# Patient Record
Sex: MALE | URBAN - METROPOLITAN AREA
[De-identification: names, ages, dates, MRNs, and addresses within clinical notes are randomized per-mention and may not be internally consistent; named-entity substitution may affect disease eponyms.]

---

## 2021-08-10 ENCOUNTER — DOCTOR'S OFFICE (OUTPATIENT)
Dept: URBAN - METROPOLITAN AREA CLINIC 163 | Facility: CLINIC | Age: 76
Setting detail: OPHTHALMOLOGY
End: 2021-08-10
Payer: MEDICARE

## 2021-08-10 PROCEDURE — 92060 SENSORIMOTOR EXAMINATION: CPT | Performed by: OPHTHALMOLOGY

## 2021-08-10 PROCEDURE — 92004 COMPRE OPH EXAM NEW PT 1/>: CPT | Performed by: OPHTHALMOLOGY

## 2021-08-10 ASSESSMENT — SPHEQUIV_DERIVED: OS_SPHEQUIV: -0.75

## 2021-08-10 ASSESSMENT — REFRACTION_CURRENTRX
OD_CYLINDER: +1.00
OS_SPHERE: +2.25
OD_SPHERE: +2.75
OD_AXIS: 151
OD_OVR_VA: 20/
OS_CYLINDER: +1.00
OS_AXIS: 27
OS_OVR_VA: 20/

## 2021-08-10 ASSESSMENT — REFRACTION_AUTOREFRACTION
OD_CYLINDER: +1.00
OS_CYLINDER: +1.00
OS_AXIS: 46
OS_SPHERE: -1.25
OD_AXIS: 150
OD_SPHERE: PL

## 2021-08-10 ASSESSMENT — CONFRONTATIONAL VISUAL FIELD TEST (CVF)
OD_FINDINGS: FULL
OS_FINDINGS: FULL

## 2021-08-10 ASSESSMENT — TONOMETRY
OS_IOP_MMHG: 11
OD_IOP_MMHG: 12

## 2021-08-10 ASSESSMENT — VISUAL ACUITY
OD_BCVA: 20/25
OS_BCVA: 20/50+2

## 2021-08-25 ENCOUNTER — DOCTOR'S OFFICE (OUTPATIENT)
Dept: URBAN - METROPOLITAN AREA CLINIC 163 | Facility: CLINIC | Age: 76
Setting detail: OPHTHALMOLOGY
End: 2021-08-25
Payer: MEDICARE

## 2021-08-25 PROCEDURE — 66821 AFTER CATARACT LASER SURGERY: CPT | Performed by: OPHTHALMOLOGY

## 2021-08-25 ASSESSMENT — REFRACTION_CURRENTRX
OD_CYLINDER: +1.00
OS_AXIS: 27
OS_SPHERE: +2.25
OD_SPHERE: +2.75
OD_OVR_VA: 20/
OS_CYLINDER: +1.00
OD_AXIS: 151
OS_OVR_VA: 20/

## 2021-08-25 ASSESSMENT — REFRACTION_AUTOREFRACTION
OD_CYLINDER: +1.00
OD_SPHERE: PL
OS_AXIS: 46
OD_AXIS: 150
OS_SPHERE: -1.25
OS_CYLINDER: +1.00

## 2021-08-25 ASSESSMENT — VISUAL ACUITY
OS_BCVA: 20/50+2
OD_BCVA: 20/25

## 2021-08-25 ASSESSMENT — SPHEQUIV_DERIVED: OS_SPHEQUIV: -0.75

## 2021-09-01 ENCOUNTER — DOCTOR'S OFFICE (OUTPATIENT)
Dept: URBAN - METROPOLITAN AREA CLINIC 163 | Facility: CLINIC | Age: 76
Setting detail: OPHTHALMOLOGY
End: 2021-09-01
Payer: MEDICARE

## 2021-09-01 PROCEDURE — 99024 POSTOP FOLLOW-UP VISIT: CPT | Performed by: OPTOMETRIST

## 2021-09-01 PROCEDURE — 66821 AFTER CATARACT LASER SURGERY: CPT | Performed by: OPTOMETRIST

## 2021-09-01 ASSESSMENT — REFRACTION_CURRENTRX
OS_AXIS: 27
OD_OVR_VA: 20/
OD_AXIS: 151
OD_SPHERE: +2.75
OS_SPHERE: +2.25
OS_OVR_VA: 20/
OS_CYLINDER: +1.00
OD_CYLINDER: +1.00

## 2021-09-01 ASSESSMENT — REFRACTION_AUTOREFRACTION
OD_CYLINDER: +1.00
OS_AXIS: 46
OS_SPHERE: -1.25
OD_AXIS: 150
OD_SPHERE: PL
OS_CYLINDER: +1.00

## 2021-09-01 ASSESSMENT — SPHEQUIV_DERIVED: OS_SPHEQUIV: -0.75

## 2021-09-01 ASSESSMENT — CONFRONTATIONAL VISUAL FIELD TEST (CVF)
OD_FINDINGS: FULL
OS_FINDINGS: FULL

## 2021-09-01 ASSESSMENT — VISUAL ACUITY
OS_BCVA: 20/50+2
OD_BCVA: 20/25

## 2021-09-14 ENCOUNTER — DOCTOR'S OFFICE (OUTPATIENT)
Dept: URBAN - METROPOLITAN AREA CLINIC 163 | Facility: CLINIC | Age: 76
Setting detail: OPHTHALMOLOGY
End: 2021-09-14
Payer: MEDICARE

## 2021-09-14 PROCEDURE — 99024 POSTOP FOLLOW-UP VISIT: CPT | Performed by: OPHTHALMOLOGY

## 2021-09-14 PROCEDURE — 92060 SENSORIMOTOR EXAMINATION: CPT | Performed by: OPHTHALMOLOGY

## 2021-09-14 ASSESSMENT — REFRACTION_AUTOREFRACTION
OD_SPHERE: 0.00
OS_SPHERE: -1.00
OD_AXIS: 156
OS_AXIS: 42
OS_CYLINDER: +0.75
OD_CYLINDER: +1.25

## 2021-09-14 ASSESSMENT — REFRACTION_MANIFEST
OD_VA1: 20/25
OD_CYLINDER: +1.00
OD_ADD: +2.50
OS_HPRISM: 5
OD_VPRISM_DIRECTION: BI
OS_CYLINDER: +0.75
OD_AXIS: 155
OS_VA1: 20/25
OS_ADD: +2.50
OS_AXIS: 25
OS_VPRISM_DIRECTION: BI
OD_SPHERE: +0.75
OD_HPRISM: 5
OS_SPHERE: PLANO

## 2021-09-14 ASSESSMENT — SPHEQUIV_DERIVED
OD_SPHEQUIV: 0.625
OS_SPHEQUIV: -0.625
OD_SPHEQUIV: 1.25

## 2021-09-14 ASSESSMENT — REFRACTION_CURRENTRX
OS_AXIS: 20
OD_AXIS: 152
OS_OVR_VA: 20/
OD_OVR_VA: 20/
OS_SPHERE: +2.25
OD_SPHERE: +2.50
OD_CYLINDER: +1.25
OS_CYLINDER: +1.00

## 2021-09-14 ASSESSMENT — VISUAL ACUITY
OD_BCVA: 20/25-1
OS_BCVA: 20/25-1

## 2022-01-12 ENCOUNTER — DOCTOR'S OFFICE (OUTPATIENT)
Dept: URBAN - METROPOLITAN AREA CLINIC 163 | Facility: CLINIC | Age: 77
Setting detail: OPHTHALMOLOGY
End: 2022-01-12
Payer: MEDICARE

## 2022-01-12 PROBLEM — H26.492 PCO; LEFT EYE: Status: ACTIVE | Noted: 2022-01-12

## 2022-01-12 PROBLEM — H50.112 EXOTROPIA, MONOCULAR, LEFT EYE: Status: ACTIVE | Noted: 2021-08-10

## 2022-01-12 PROBLEM — H53.2: Status: ACTIVE | Noted: 2021-08-10

## 2022-01-12 PROCEDURE — 92014 COMPRE OPH EXAM EST PT 1/>: CPT | Performed by: OPTOMETRIST

## 2022-01-12 ASSESSMENT — REFRACTION_MANIFEST
OS_CYLINDER: +0.75
OS_HPRISM: 5
OS_CYLINDER: +1.00
OS_SPHERE: PLANO
OS_VA1: 20/25
OS_SPHERE: -1.00
OD_AXIS: 155
OD_VA1: 20/25
OD_CYLINDER: +1.00
OS_ADD: +2.50
OD_CYLINDER: +1.00
OD_ADD: +2.50
OS_AXIS: 45
OD_SPHERE: +0.75
OD_SPHERE: PL
OS_ADD: +2.50
OD_AXIS: 145
OS_AXIS: 25
OD_VA1: 20/25
OD_ADD: +2.50
OD_HPRISM: 5
OS_VA1: 20/25
OD_VPRISM_DIRECTION: BI
OS_VPRISM_DIRECTION: BI

## 2022-01-12 ASSESSMENT — SPHEQUIV_DERIVED
OD_SPHEQUIV: 1.25
OS_SPHEQUIV: -0.5
OD_SPHEQUIV: 0
OS_SPHEQUIV: -1

## 2022-01-12 ASSESSMENT — VISUAL ACUITY
OS_BCVA: 20/40
OD_BCVA: 20/40

## 2022-01-12 ASSESSMENT — REFRACTION_CURRENTRX
OD_CYLINDER: +1.25
OS_OVR_VA: 20/
OD_OVR_VA: 20/
OD_SPHERE: +2.50
OS_CYLINDER: +1.00
OS_SPHERE: +2.25
OD_AXIS: 152
OS_AXIS: 20

## 2022-01-12 ASSESSMENT — REFRACTION_AUTOREFRACTION
OS_AXIS: 40
OS_SPHERE: -1.50
OD_AXIS: 155
OS_CYLINDER: +1.00
OD_SPHERE: -0.50
OD_CYLINDER: +1.00

## 2022-01-12 ASSESSMENT — CONFRONTATIONAL VISUAL FIELD TEST (CVF)
OD_FINDINGS: FULL
OS_FINDINGS: FULL

## 2022-07-14 ENCOUNTER — TRANSCRIPTION ENCOUNTER (OUTPATIENT)
Age: 77
End: 2022-07-14

## 2022-07-15 ENCOUNTER — INPATIENT (INPATIENT)
Facility: HOSPITAL | Age: 77
LOS: 9 days | Discharge: NURSING, REHAB OR LONG-TERM CARE | DRG: 252 | End: 2022-07-25
Attending: SURGERY | Admitting: SURGERY
Payer: MEDICARE

## 2022-07-15 VITALS
RESPIRATION RATE: 18 BRPM | HEART RATE: 60 BPM | OXYGEN SATURATION: 92 % | TEMPERATURE: 99 F | SYSTOLIC BLOOD PRESSURE: 172 MMHG | DIASTOLIC BLOOD PRESSURE: 75 MMHG

## 2022-07-15 DIAGNOSIS — I72.4 ANEURYSM OF ARTERY OF LOWER EXTREMITY: Chronic | ICD-10-CM

## 2022-07-15 DIAGNOSIS — I70.25 ATHEROSCLEROSIS OF NATIVE ARTERIES OF OTHER EXTREMITIES WITH ULCERATION: ICD-10-CM

## 2022-07-15 DIAGNOSIS — Z95.2 PRESENCE OF PROSTHETIC HEART VALVE: Chronic | ICD-10-CM

## 2022-07-15 DIAGNOSIS — Z95.0 PRESENCE OF CARDIAC PACEMAKER: Chronic | ICD-10-CM

## 2022-07-15 LAB
ALBUMIN SERPL ELPH-MCNC: 4.5 G/DL — SIGNIFICANT CHANGE UP (ref 3.3–5)
ALP SERPL-CCNC: 61 U/L — SIGNIFICANT CHANGE UP (ref 40–120)
ALT FLD-CCNC: 42 U/L — SIGNIFICANT CHANGE UP (ref 10–45)
ANION GAP SERPL CALC-SCNC: 14 MMOL/L — SIGNIFICANT CHANGE UP (ref 5–17)
ANION GAP SERPL CALC-SCNC: 14 MMOL/L — SIGNIFICANT CHANGE UP (ref 5–17)
APTT BLD: 106.9 SEC — HIGH (ref 27.5–35.5)
APTT BLD: 29 SEC — SIGNIFICANT CHANGE UP (ref 27.5–35.5)
APTT BLD: 32.1 SEC — SIGNIFICANT CHANGE UP (ref 27.5–35.5)
AST SERPL-CCNC: 119 U/L — HIGH (ref 10–40)
BASOPHILS # BLD AUTO: 0.06 K/UL — SIGNIFICANT CHANGE UP (ref 0–0.2)
BASOPHILS NFR BLD AUTO: 0.6 % — SIGNIFICANT CHANGE UP (ref 0–2)
BILIRUB SERPL-MCNC: 0.7 MG/DL — SIGNIFICANT CHANGE UP (ref 0.2–1.2)
BLD GP AB SCN SERPL QL: NEGATIVE — SIGNIFICANT CHANGE UP
BUN SERPL-MCNC: 26 MG/DL — HIGH (ref 7–23)
BUN SERPL-MCNC: 29 MG/DL — HIGH (ref 7–23)
CALCIUM SERPL-MCNC: 9.4 MG/DL — SIGNIFICANT CHANGE UP (ref 8.4–10.5)
CALCIUM SERPL-MCNC: 9.9 MG/DL — SIGNIFICANT CHANGE UP (ref 8.4–10.5)
CHLORIDE SERPL-SCNC: 96 MMOL/L — SIGNIFICANT CHANGE UP (ref 96–108)
CHLORIDE SERPL-SCNC: 97 MMOL/L — SIGNIFICANT CHANGE UP (ref 96–108)
CO2 SERPL-SCNC: 22 MMOL/L — SIGNIFICANT CHANGE UP (ref 22–31)
CO2 SERPL-SCNC: 24 MMOL/L — SIGNIFICANT CHANGE UP (ref 22–31)
CREAT SERPL-MCNC: 1.24 MG/DL — SIGNIFICANT CHANGE UP (ref 0.5–1.3)
CREAT SERPL-MCNC: 1.27 MG/DL — SIGNIFICANT CHANGE UP (ref 0.5–1.3)
EGFR: 58 ML/MIN/1.73M2 — LOW
EGFR: 60 ML/MIN/1.73M2 — SIGNIFICANT CHANGE UP
EOSINOPHIL # BLD AUTO: 0.04 K/UL — SIGNIFICANT CHANGE UP (ref 0–0.5)
EOSINOPHIL NFR BLD AUTO: 0.4 % — SIGNIFICANT CHANGE UP (ref 0–6)
GLUCOSE BLDC GLUCOMTR-MCNC: 128 MG/DL — HIGH (ref 70–99)
GLUCOSE BLDC GLUCOMTR-MCNC: 140 MG/DL — HIGH (ref 70–99)
GLUCOSE SERPL-MCNC: 129 MG/DL — HIGH (ref 70–99)
GLUCOSE SERPL-MCNC: 132 MG/DL — HIGH (ref 70–99)
HCT VFR BLD CALC: 39.6 % — SIGNIFICANT CHANGE UP (ref 39–50)
HCT VFR BLD CALC: 40.9 % — SIGNIFICANT CHANGE UP (ref 39–50)
HCT VFR BLD CALC: 41.9 % — SIGNIFICANT CHANGE UP (ref 39–50)
HGB BLD-MCNC: 13.9 G/DL — SIGNIFICANT CHANGE UP (ref 13–17)
HGB BLD-MCNC: 14.7 G/DL — SIGNIFICANT CHANGE UP (ref 13–17)
HGB BLD-MCNC: 14.7 G/DL — SIGNIFICANT CHANGE UP (ref 13–17)
IMM GRANULOCYTES NFR BLD AUTO: 0.4 % — SIGNIFICANT CHANGE UP (ref 0–1.5)
INR BLD: 1.12 — SIGNIFICANT CHANGE UP (ref 0.88–1.16)
ISTAT ACTK (ACTIVATED CLOTTING TIME KAOLIN): 144 SEC — HIGH (ref 74–137)
LYMPHOCYTES # BLD AUTO: 0.92 K/UL — LOW (ref 1–3.3)
LYMPHOCYTES # BLD AUTO: 8.9 % — LOW (ref 13–44)
MAGNESIUM SERPL-MCNC: 1.8 MG/DL — SIGNIFICANT CHANGE UP (ref 1.6–2.6)
MCHC RBC-ENTMCNC: 31.7 PG — SIGNIFICANT CHANGE UP (ref 27–34)
MCHC RBC-ENTMCNC: 31.8 PG — SIGNIFICANT CHANGE UP (ref 27–34)
MCHC RBC-ENTMCNC: 32.2 PG — SIGNIFICANT CHANGE UP (ref 27–34)
MCHC RBC-ENTMCNC: 35.1 GM/DL — SIGNIFICANT CHANGE UP (ref 32–36)
MCHC RBC-ENTMCNC: 35.1 GM/DL — SIGNIFICANT CHANGE UP (ref 32–36)
MCHC RBC-ENTMCNC: 35.9 GM/DL — SIGNIFICANT CHANGE UP (ref 32–36)
MCV RBC AUTO: 89.5 FL — SIGNIFICANT CHANGE UP (ref 80–100)
MCV RBC AUTO: 90.3 FL — SIGNIFICANT CHANGE UP (ref 80–100)
MCV RBC AUTO: 90.6 FL — SIGNIFICANT CHANGE UP (ref 80–100)
MONOCYTES # BLD AUTO: 1.18 K/UL — HIGH (ref 0–0.9)
MONOCYTES NFR BLD AUTO: 11.5 % — SIGNIFICANT CHANGE UP (ref 2–14)
NEUTROPHILS # BLD AUTO: 8.06 K/UL — HIGH (ref 1.8–7.4)
NEUTROPHILS NFR BLD AUTO: 78.2 % — HIGH (ref 43–77)
NRBC # BLD: 0 /100 WBCS — SIGNIFICANT CHANGE UP (ref 0–0)
PLATELET # BLD AUTO: 154 K/UL — SIGNIFICANT CHANGE UP (ref 150–400)
PLATELET # BLD AUTO: 158 K/UL — SIGNIFICANT CHANGE UP (ref 150–400)
PLATELET # BLD AUTO: 167 K/UL — SIGNIFICANT CHANGE UP (ref 150–400)
POTASSIUM SERPL-MCNC: 4 MMOL/L — SIGNIFICANT CHANGE UP (ref 3.5–5.3)
POTASSIUM SERPL-MCNC: 4 MMOL/L — SIGNIFICANT CHANGE UP (ref 3.5–5.3)
POTASSIUM SERPL-SCNC: 4 MMOL/L — SIGNIFICANT CHANGE UP (ref 3.5–5.3)
POTASSIUM SERPL-SCNC: 4 MMOL/L — SIGNIFICANT CHANGE UP (ref 3.5–5.3)
PROT SERPL-MCNC: 7.5 G/DL — SIGNIFICANT CHANGE UP (ref 6–8.3)
PROTHROM AB SERPL-ACNC: 13.3 SEC — SIGNIFICANT CHANGE UP (ref 10.5–13.4)
RBC # BLD: 4.37 M/UL — SIGNIFICANT CHANGE UP (ref 4.2–5.8)
RBC # BLD: 4.57 M/UL — SIGNIFICANT CHANGE UP (ref 4.2–5.8)
RBC # BLD: 4.64 M/UL — SIGNIFICANT CHANGE UP (ref 4.2–5.8)
RBC # FLD: 13.6 % — SIGNIFICANT CHANGE UP (ref 10.3–14.5)
RBC # FLD: 13.7 % — SIGNIFICANT CHANGE UP (ref 10.3–14.5)
RBC # FLD: 13.8 % — SIGNIFICANT CHANGE UP (ref 10.3–14.5)
RH IG SCN BLD-IMP: POSITIVE — SIGNIFICANT CHANGE UP
SARS-COV-2 RNA SPEC QL NAA+PROBE: NEGATIVE — SIGNIFICANT CHANGE UP
SODIUM SERPL-SCNC: 132 MMOL/L — LOW (ref 135–145)
SODIUM SERPL-SCNC: 135 MMOL/L — SIGNIFICANT CHANGE UP (ref 135–145)
WBC # BLD: 10.3 K/UL — SIGNIFICANT CHANGE UP (ref 3.8–10.5)
WBC # BLD: 9.37 K/UL — SIGNIFICANT CHANGE UP (ref 3.8–10.5)
WBC # BLD: 9.41 K/UL — SIGNIFICANT CHANGE UP (ref 3.8–10.5)
WBC # FLD AUTO: 10.3 K/UL — SIGNIFICANT CHANGE UP (ref 3.8–10.5)
WBC # FLD AUTO: 9.37 K/UL — SIGNIFICANT CHANGE UP (ref 3.8–10.5)
WBC # FLD AUTO: 9.41 K/UL — SIGNIFICANT CHANGE UP (ref 3.8–10.5)

## 2022-07-15 PROCEDURE — 93010 ELECTROCARDIOGRAM REPORT: CPT

## 2022-07-15 PROCEDURE — 37211 THROMBOLYTIC ART THERAPY: CPT | Mod: GC

## 2022-07-15 PROCEDURE — 76937 US GUIDE VASCULAR ACCESS: CPT | Mod: 26,GC

## 2022-07-15 PROCEDURE — 71045 X-RAY EXAM CHEST 1 VIEW: CPT | Mod: 26

## 2022-07-15 PROCEDURE — 36246 INS CATH ABD/L-EXT ART 2ND: CPT

## 2022-07-15 PROCEDURE — 99285 EMERGENCY DEPT VISIT HI MDM: CPT | Mod: 25

## 2022-07-15 RX ORDER — HYDRALAZINE HCL 50 MG
10 TABLET ORAL ONCE
Refills: 0 | Status: COMPLETED | OUTPATIENT
Start: 2022-07-15 | End: 2022-07-15

## 2022-07-15 RX ORDER — FENOFIBRATE,MICRONIZED 130 MG
145 CAPSULE ORAL DAILY
Refills: 0 | Status: DISCONTINUED | OUTPATIENT
Start: 2022-07-15 | End: 2022-07-18

## 2022-07-15 RX ORDER — ATORVASTATIN CALCIUM 80 MG/1
40 TABLET, FILM COATED ORAL DAILY
Refills: 0 | Status: DISCONTINUED | OUTPATIENT
Start: 2022-07-15 | End: 2022-07-15

## 2022-07-15 RX ORDER — ATORVASTATIN CALCIUM 80 MG/1
40 TABLET, FILM COATED ORAL AT BEDTIME
Refills: 0 | Status: DISCONTINUED | OUTPATIENT
Start: 2022-07-15 | End: 2022-07-18

## 2022-07-15 RX ORDER — ASPIRIN/CALCIUM CARB/MAGNESIUM 324 MG
81 TABLET ORAL DAILY
Refills: 0 | Status: DISCONTINUED | OUTPATIENT
Start: 2022-07-16 | End: 2022-07-18

## 2022-07-15 RX ORDER — VENLAFAXINE HCL 75 MG
100 CAPSULE, EXT RELEASE 24 HR ORAL
Refills: 0 | Status: DISCONTINUED | OUTPATIENT
Start: 2022-07-15 | End: 2022-07-18

## 2022-07-15 RX ORDER — METHOCARBAMOL 500 MG/1
500 TABLET, FILM COATED ORAL THREE TIMES A DAY
Refills: 0 | Status: DISCONTINUED | OUTPATIENT
Start: 2022-07-15 | End: 2022-07-18

## 2022-07-15 RX ORDER — HYDROMORPHONE HYDROCHLORIDE 2 MG/ML
0.25 INJECTION INTRAMUSCULAR; INTRAVENOUS; SUBCUTANEOUS ONCE
Refills: 0 | Status: DISCONTINUED | OUTPATIENT
Start: 2022-07-15 | End: 2022-07-15

## 2022-07-15 RX ORDER — CLONAZEPAM 1 MG
0.5 TABLET ORAL EVERY 8 HOURS
Refills: 0 | Status: DISCONTINUED | OUTPATIENT
Start: 2022-07-15 | End: 2022-07-16

## 2022-07-15 RX ORDER — ACETAMINOPHEN 500 MG
1000 TABLET ORAL EVERY 6 HOURS
Refills: 0 | Status: DISCONTINUED | OUTPATIENT
Start: 2022-07-15 | End: 2022-07-15

## 2022-07-15 RX ORDER — TRAZODONE HCL 50 MG
150 TABLET ORAL AT BEDTIME
Refills: 0 | Status: DISCONTINUED | OUTPATIENT
Start: 2022-07-15 | End: 2022-07-18

## 2022-07-15 RX ORDER — MORPHINE SULFATE 50 MG/1
2 CAPSULE, EXTENDED RELEASE ORAL ONCE
Refills: 0 | Status: DISCONTINUED | OUTPATIENT
Start: 2022-07-15 | End: 2022-07-15

## 2022-07-15 RX ORDER — ALLOPURINOL 300 MG
100 TABLET ORAL DAILY
Refills: 0 | Status: DISCONTINUED | OUTPATIENT
Start: 2022-07-15 | End: 2022-07-18

## 2022-07-15 RX ORDER — OXYCODONE HYDROCHLORIDE 5 MG/1
5 TABLET ORAL EVERY 4 HOURS
Refills: 0 | Status: DISCONTINUED | OUTPATIENT
Start: 2022-07-15 | End: 2022-07-15

## 2022-07-15 RX ORDER — SODIUM CHLORIDE 9 MG/ML
1000 INJECTION INTRAMUSCULAR; INTRAVENOUS; SUBCUTANEOUS
Refills: 0 | Status: DISCONTINUED | OUTPATIENT
Start: 2022-07-15 | End: 2022-07-16

## 2022-07-15 RX ORDER — HEPARIN SODIUM 5000 [USP'U]/ML
7000 INJECTION INTRAVENOUS; SUBCUTANEOUS EVERY 6 HOURS
Refills: 0 | Status: DISCONTINUED | OUTPATIENT
Start: 2022-07-15 | End: 2022-07-15

## 2022-07-15 RX ORDER — OXYCODONE HYDROCHLORIDE 5 MG/1
5 TABLET ORAL ONCE
Refills: 0 | Status: DISCONTINUED | OUTPATIENT
Start: 2022-07-15 | End: 2022-07-15

## 2022-07-15 RX ORDER — OXYCODONE AND ACETAMINOPHEN 5; 325 MG/1; MG/1
2 TABLET ORAL EVERY 4 HOURS
Refills: 0 | Status: DISCONTINUED | OUTPATIENT
Start: 2022-07-15 | End: 2022-07-18

## 2022-07-15 RX ORDER — HEPARIN SODIUM 5000 [USP'U]/ML
1500 INJECTION INTRAVENOUS; SUBCUTANEOUS
Qty: 25000 | Refills: 0 | Status: DISCONTINUED | OUTPATIENT
Start: 2022-07-15 | End: 2022-07-15

## 2022-07-15 RX ORDER — HEPARIN SODIUM 5000 [USP'U]/ML
1400 INJECTION INTRAVENOUS; SUBCUTANEOUS
Qty: 25000 | Refills: 0 | Status: DISCONTINUED | OUTPATIENT
Start: 2022-07-15 | End: 2022-07-18

## 2022-07-15 RX ORDER — NIFEDIPINE 30 MG
60 TABLET, EXTENDED RELEASE 24 HR ORAL DAILY
Refills: 0 | Status: DISCONTINUED | OUTPATIENT
Start: 2022-07-15 | End: 2022-07-18

## 2022-07-15 RX ORDER — HYDRALAZINE HCL 50 MG
5 TABLET ORAL ONCE
Refills: 0 | Status: COMPLETED | OUTPATIENT
Start: 2022-07-15 | End: 2022-07-15

## 2022-07-15 RX ORDER — HEPARIN SODIUM 5000 [USP'U]/ML
3500 INJECTION INTRAVENOUS; SUBCUTANEOUS EVERY 6 HOURS
Refills: 0 | Status: DISCONTINUED | OUTPATIENT
Start: 2022-07-15 | End: 2022-07-15

## 2022-07-15 RX ORDER — CARBAMAZEPINE 200 MG
200 TABLET ORAL
Refills: 0 | Status: DISCONTINUED | OUTPATIENT
Start: 2022-07-15 | End: 2022-07-18

## 2022-07-15 RX ORDER — PANTOPRAZOLE SODIUM 20 MG/1
40 TABLET, DELAYED RELEASE ORAL
Refills: 0 | Status: DISCONTINUED | OUTPATIENT
Start: 2022-07-15 | End: 2022-07-18

## 2022-07-15 RX ORDER — HYDROMORPHONE HYDROCHLORIDE 2 MG/ML
0.5 INJECTION INTRAMUSCULAR; INTRAVENOUS; SUBCUTANEOUS ONCE
Refills: 0 | Status: DISCONTINUED | OUTPATIENT
Start: 2022-07-15 | End: 2022-07-15

## 2022-07-15 RX ORDER — LABETALOL HCL 100 MG
10 TABLET ORAL ONCE
Refills: 0 | Status: COMPLETED | OUTPATIENT
Start: 2022-07-15 | End: 2022-07-15

## 2022-07-15 RX ORDER — HEPARIN SODIUM 5000 [USP'U]/ML
INJECTION INTRAVENOUS; SUBCUTANEOUS
Qty: 25000 | Refills: 0 | Status: DISCONTINUED | OUTPATIENT
Start: 2022-07-15 | End: 2022-07-15

## 2022-07-15 RX ORDER — HEPARIN SODIUM 5000 [USP'U]/ML
7000 INJECTION INTRAVENOUS; SUBCUTANEOUS ONCE
Refills: 0 | Status: COMPLETED | OUTPATIENT
Start: 2022-07-15 | End: 2022-07-15

## 2022-07-15 RX ORDER — TAMSULOSIN HYDROCHLORIDE 0.4 MG/1
0.4 CAPSULE ORAL AT BEDTIME
Refills: 0 | Status: DISCONTINUED | OUTPATIENT
Start: 2022-07-15 | End: 2022-07-18

## 2022-07-15 RX ADMIN — Medication 150 MILLIGRAM(S): at 21:29

## 2022-07-15 RX ADMIN — HYDROMORPHONE HYDROCHLORIDE 0.5 MILLIGRAM(S): 2 INJECTION INTRAMUSCULAR; INTRAVENOUS; SUBCUTANEOUS at 14:31

## 2022-07-15 RX ADMIN — MORPHINE SULFATE 2 MILLIGRAM(S): 50 CAPSULE, EXTENDED RELEASE ORAL at 04:00

## 2022-07-15 RX ADMIN — HEPARIN SODIUM 7000 UNIT(S): 5000 INJECTION INTRAVENOUS; SUBCUTANEOUS at 04:02

## 2022-07-15 RX ADMIN — HYDROMORPHONE HYDROCHLORIDE 0.25 MILLIGRAM(S): 2 INJECTION INTRAMUSCULAR; INTRAVENOUS; SUBCUTANEOUS at 14:27

## 2022-07-15 RX ADMIN — METHOCARBAMOL 500 MILLIGRAM(S): 500 TABLET, FILM COATED ORAL at 18:36

## 2022-07-15 RX ADMIN — HYDROMORPHONE HYDROCHLORIDE 0.25 MILLIGRAM(S): 2 INJECTION INTRAMUSCULAR; INTRAVENOUS; SUBCUTANEOUS at 14:12

## 2022-07-15 RX ADMIN — TAMSULOSIN HYDROCHLORIDE 0.4 MILLIGRAM(S): 0.4 CAPSULE ORAL at 21:30

## 2022-07-15 RX ADMIN — OXYCODONE AND ACETAMINOPHEN 2 TABLET(S): 5; 325 TABLET ORAL at 15:44

## 2022-07-15 RX ADMIN — Medication 100 MILLIGRAM(S): at 19:55

## 2022-07-15 RX ADMIN — Medication 5 MILLIGRAM(S): at 17:59

## 2022-07-15 RX ADMIN — METHOCARBAMOL 500 MILLIGRAM(S): 500 TABLET, FILM COATED ORAL at 23:52

## 2022-07-15 RX ADMIN — Medication 145 MILLIGRAM(S): at 23:12

## 2022-07-15 RX ADMIN — Medication 60 MILLIGRAM(S): at 06:21

## 2022-07-15 RX ADMIN — OXYCODONE AND ACETAMINOPHEN 2 TABLET(S): 5; 325 TABLET ORAL at 15:14

## 2022-07-15 RX ADMIN — HEPARIN SODIUM 1600 UNIT(S)/HR: 5000 INJECTION INTRAVENOUS; SUBCUTANEOUS at 04:08

## 2022-07-15 RX ADMIN — MORPHINE SULFATE 2 MILLIGRAM(S): 50 CAPSULE, EXTENDED RELEASE ORAL at 03:26

## 2022-07-15 RX ADMIN — HEPARIN SODIUM 14 UNIT(S)/HR: 5000 INJECTION INTRAVENOUS; SUBCUTANEOUS at 20:40

## 2022-07-15 RX ADMIN — Medication 10 MILLIGRAM(S): at 14:05

## 2022-07-15 RX ADMIN — SODIUM CHLORIDE 100 MILLILITER(S): 9 INJECTION INTRAMUSCULAR; INTRAVENOUS; SUBCUTANEOUS at 19:54

## 2022-07-15 RX ADMIN — Medication 200 MILLIGRAM(S): at 06:21

## 2022-07-15 RX ADMIN — OXYCODONE AND ACETAMINOPHEN 2 TABLET(S): 5; 325 TABLET ORAL at 19:55

## 2022-07-15 RX ADMIN — HYDROMORPHONE HYDROCHLORIDE 0.25 MILLIGRAM(S): 2 INJECTION INTRAMUSCULAR; INTRAVENOUS; SUBCUTANEOUS at 06:38

## 2022-07-15 RX ADMIN — ATORVASTATIN CALCIUM 40 MILLIGRAM(S): 80 TABLET, FILM COATED ORAL at 21:30

## 2022-07-15 RX ADMIN — HYDROMORPHONE HYDROCHLORIDE 0.25 MILLIGRAM(S): 2 INJECTION INTRAMUSCULAR; INTRAVENOUS; SUBCUTANEOUS at 06:22

## 2022-07-15 RX ADMIN — PANTOPRAZOLE SODIUM 40 MILLIGRAM(S): 20 TABLET, DELAYED RELEASE ORAL at 06:21

## 2022-07-15 RX ADMIN — Medication 10 MILLIGRAM(S): at 14:31

## 2022-07-15 RX ADMIN — Medication 100 MILLIGRAM(S): at 16:50

## 2022-07-15 RX ADMIN — Medication 5 MILLIGRAM(S): at 18:25

## 2022-07-15 RX ADMIN — OXYCODONE AND ACETAMINOPHEN 2 TABLET(S): 5; 325 TABLET ORAL at 20:55

## 2022-07-15 RX ADMIN — Medication 0.5 MILLIGRAM(S): at 18:24

## 2022-07-15 NOTE — BRIEF OPERATIVE NOTE - ASSISTANT(S)
Patient Education     Viral Diarrhea (Adult)    Diarrhea caused by a virus is often called viral gastroenteritis. Many people call it the \"stomach flu,\" but it has nothing to do with influenza. The virus that causes diarrhea affects the stomach and intestinal tract and usually lasts from 2 to 7 days. Diarrhea is the passing of loose, watery stools 3 or more times a day.  Symptoms  Along with diarrhea, you may have these symptoms:  · Abdominal pain and cramping  · Nausea and vomiting  · Loss of bowel control  · Fever and chills  · Bloody stools  The danger from repeated diarrhea is dehydration. Dehydration is the loss of too much water and other fluids from the body without taking in enough to replace what is lost.  Antibiotics are not effective in this illness, but there are a number of things you can do at home that will help.  Home care  Follow these home care measures:  · If symptoms are severe, rest at home for the next 24 hours or until you are feeling better.  · Wash your hands with soap and water or alcohol-based  to prevent the spread of infection. Wash your hands after touching anyone who is sick.  · Wash your hands after using the toilet and before meals. Clean the toilet after each use.  Food preparation:  · People with diarrhea should not prepare food for others. When preparing foods, wash your hands after touching anyone who is sick.  · Wash your hands after using cutting boards, countertops, and knives that have been in contact with raw food.  · Keep uncooked meats away from cooked and ready-to-eat foods.  Medicines:  · You may use acetaminophen or NSAIDS such as ibuprofen or naproxen to control fever unless another medicine was prescribed.  If you have chronic liver or kidney disease or ever had a stomach ulcer or gastrointestinal bleeding, talk with your healthcare provider before using these medicines. Aspirin should never be used in anyone under 18 years of age who is ill with a fever.  It may cause severe liver damage. Don't use NSAID medicines if you are already taking one for another condition (like arthritis) or are on aspirin (such as for heart disease or after a stroke).  · Anti-diarrhea medicine should be taken for this condition only if advised by your healthcare provider. Sometimes anti-diarrhea medicine can make your condition worse. If you have bloody diarrhea or fever, check with your healthcare provider before taking antidiarrheals.  Diet:  · Water and clear liquids are important so you don't get dehydrated. Drink small amounts at a time, don't guzzle it down. If you are very dehydrated, sports drinks aren't a good choice. They have too much sugar and not enough electrolytes. In this case, commercially available products called oral rehydration solutions are best.  · Caffeine, tobacco, and alcohol can make the diarrhea, cramping, and pain worse.  · Don't force yourself to eat, especially if you have cramping, vomiting, or diarrhea. Don't eat large amounts at a time, even if you are hungry. It may make you feel worse.  · If you eat, avoid fatty, greasy, spicy, or fried foods.  · No dairy products, as they can make diarrhea worse.  During the first 24 Hours (the first full day) follow the diet below:  · Beverages: Water, clear liquids, soft drinks without caffeine; ginger ale, mineral water (plain or flavored), decaffeinated tea and coffee.  · Soups: Clear broth, consommé and bouillon  · Desserts: Plain gelatin, popsicles and fruit juice bars  During the next 24 hours (the second day) you may add the following to the above if you have improved:  · Hot cereal, plain toast, bread, rolls, crackers  · Plain noodles, rice, mashed potatoes, chicken noodle or rice soup  · Unsweetened canned fruit like applesauce and bananas (avoid pineapple and citrus)  · Limit fat intake to less than 15 grams per day by avoiding margarine, butter, oils, mayonnaise, sauces, gravies, fried foods, peanut butter,  meat, poultry and fish.  · Limit fiber; avoid raw or cooked vegetables, fresh fruits (except bananas) and bran cereals.  · Limit caffeine and chocolate. No spices or seasonings except salt.  During the next 24 hours  · Gradually resume a normal diet, as you feel better and your symptoms improve.  · If at any time the diarrhea or cramping gets worse, go back to the simpler diet (above) or to clear liquids.  Follow-up care  Follow up with your healthcare provider, or as advised. Call if you are not improving within 24 hours or if the diarrhea lasts more than one week. This is especially true if you are in a high-risk group. For example, if you are very elderly, have a weak immune system (from cancer treatment for example), or you have inflammatory bowel disease (Crohn's or colitis).  If a stool (diarrhea) sample was taken, you may call in 2 days (or as directed) for the results.  When to seek medical advice  Call your healthcare provider right away if any of the following occur:  · Increasing abdominal pain or constant lower right abdominal pain  · Continued vomiting (unable to keep liquids down)  · Frequent diarrhea (more than 5 times a day)  · Blood in vomit or stool (black or red color)  · Reduced oral intake  · Dark urine, reduced urine output  · Weakness, dizziness  · Drowsiness  · Fever of 100.4°F (38°C) oral or higher, or as directed by your healthcare provider  · New rash  Call 911  Call 911 if any of the following occur:  · Trouble breathing  · Confused  · Severe drowsiness or trouble awakening  · Fainting or loss of consciousness  · Rapid heart rate  · Seizure  · Stiff neck  Date Last Reviewed: 3/1/2018  © 6815-7714 [employer]. [address] [place] [address], [place] [address]. All rights reserved. This information is not intended as a substitute for professional medical care. Always follow your healthcare professional's instructions.            Dr. Hackett, PGY-1

## 2022-07-15 NOTE — H&P ADULT - NSHPPHYSICALEXAM_GEN_ALL_CORE
PHYSICAL EXAM:    Constitutional: Pt AXOX3 in NAD    Respiratory: Unlabored    Cardiovascular:S1S2    Extremities: LLE Mottled and coool    Vascular: RLE: mono DP/PT palp pop & Fem   LLE: no DP/PT, Biphasic Pop, palp fem    Neurological: Motor Sensory intact    Skin: Mottled

## 2022-07-15 NOTE — H&P ADULT - NSICDXPASTSURGICALHX_GEN_ALL_CORE_FT
PAST SURGICAL HISTORY:  Pacemaker     Popliteal aneurysm     S/P TAVR (transcatheter aortic valve replacement)

## 2022-07-15 NOTE — ED PROVIDER NOTE - OBJECTIVE STATEMENT
77M hx htn, high chol, dm, pacemaker, pvd, c/o left leg pain. pt states had popliteal aneurysm that was stented in the past. having left leg pain for past 2 days. states leg is mottled and cool.  no fevers. pt was seen at outside hospital in [place] and was told the stent was occluded and the leg had to be amputated.  states had EMS bring him here to be evaluated by vascular and Dr. Joyce. 77M hx htn, high chol, dm, pacemaker, pvd, TAVR, c/o left leg pain. pt states had popliteal aneurysm that was stented in the past. having left leg pain for past 2 days. states leg is mottled and cool.  no fevers. pt was seen at outside hospital in [place] and was told the stent was occluded and the leg had to be amputated.  states had EMS bring him here to be evaluated by vascular and Dr. Joyce.

## 2022-07-15 NOTE — PATIENT PROFILE ADULT - FALL HARM RISK - HARM RISK INTERVENTIONS
Assistance with ambulation/Assistance OOB with selected safe patient handling equipment/Communicate Risk of Fall with Harm to all staff/Discuss with provider need for PT consult/Monitor gait and stability/Provide patient with walking aids - walker, cane, crutches/Reinforce activity limits and safety measures with patient and family/Tailored Fall Risk Interventions/Use of alarms - bed, chair and/or voice tab/Visual Cue: Yellow wristband and red socks/Bed in lowest position, wheels locked, appropriate side rails in place/Call bell, personal items and telephone in reach/Instruct patient to call for assistance before getting out of bed or chair/Non-slip footwear when patient is out of bed/[place] to call system/Physically safe environment - no spills, clutter or unnecessary equipment/Purposeful Proactive Rounding/Room/bathroom lighting operational, light cord in reach

## 2022-07-15 NOTE — H&P ADULT - HISTORY OF PRESENT ILLNESS
The pt is a 77M hx HTN, HLD, DM, Pacemaker, PVD, TAVR, c/o left leg pain x2 days. 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 Pt went to Urgent Care after pain started 2 days prior, he was then referred to the ED for further mgmt once it was established there was no flow to affected limb along with leg being mottled and cool. Pt was then seen at outside hospital in [place] and was told the stent was occluded, he was then seen by a vascular surgeon who did an angiogram one day prior and told pt the leg had to be amputated. Pt would like second a second opinion from Dr Capone.

## 2022-07-15 NOTE — ED PROVIDER NOTE - NSICDXPASTSURGICALHX_GEN_ALL_CORE_FT
PAST SURGICAL HISTORY:  Pacemaker     Popliteal aneurysm      PAST SURGICAL HISTORY:  Pacemaker     Popliteal aneurysm     S/P TAVR (transcatheter aortic valve replacement)

## 2022-07-15 NOTE — ED ADULT NURSE NOTE - OBJECTIVE STATEMENT
pt was seen at another hospital and was told that his L leg needed amputation. pt wanted a second opinion so he AMA'd from the hospital and EMS brought him to [facility]> as per pt, pt has an appointment pt was seen at another hospital and was told that his L leg needed amputation. pt wanted a second opinion so he AMA'd from the hospital and EMS brought him to [facility]> as per pt, pt has an appointment with a vascular surgeon but he wants to see him in the ED. decreased pulses and discoloration on the LLE. large scab on the calf. able to move extremities, and sensation intact.

## 2022-07-15 NOTE — INPATIENT CERTIFICATION FOR MEDICARE PATIENTS - RISKS OF ADVERSE EVENTS
Loss of limb/Concern for worsening infectious process/Concern for delay in diagnosis and treatment/Other:

## 2022-07-15 NOTE — ED ADULT TRIAGE NOTE - ARRIVAL INFO ADDITIONAL COMMENTS
pt signed out of [facility] ER after being seen for left lower leg pain,  pt states he was told there that he needed an amputation and he did not feel comfortable having surgery there so [facility] brought him here.    pt has a scab on his shin and mottled lower leg with decreased feeling.

## 2022-07-15 NOTE — H&P ADULT - ASSESSMENT
77y.o M Hx HTN, HLD, DM, Pacemaker, Afib and PVD presents with LLE Critical limb ischemia.      -Admit to Vascular -> Dr Capone  -HepGtt w/ Bolus  -Restart Home MEds  -Hold Losartan and Eliquis  -Cards Clearance  -Obtain Echo from outside facility  -Upload CD of Angiogram    -NPO

## 2022-07-15 NOTE — ED PROVIDER NOTE - CLINICAL SUMMARY MEDICAL DECISION MAKING FREE TEXT BOX
left leg pain, cool, mottled, no palpable pulses, was told via CT that stent occluded at outside hospital. here for vascular evaluation  -check labs  -ekg  -cxr  -vasc consulted

## 2022-07-16 DIAGNOSIS — I10 ESSENTIAL (PRIMARY) HYPERTENSION: ICD-10-CM

## 2022-07-16 DIAGNOSIS — N17.9 ACUTE KIDNEY FAILURE, UNSPECIFIED: ICD-10-CM

## 2022-07-16 DIAGNOSIS — Z86.79 PERSONAL HISTORY OF OTHER DISEASES OF THE CIRCULATORY SYSTEM: ICD-10-CM

## 2022-07-16 DIAGNOSIS — E11.9 TYPE 2 DIABETES MELLITUS WITHOUT COMPLICATIONS: ICD-10-CM

## 2022-07-16 DIAGNOSIS — I51.7 CARDIOMEGALY: ICD-10-CM

## 2022-07-16 DIAGNOSIS — E78.00 PURE HYPERCHOLESTEROLEMIA, UNSPECIFIED: ICD-10-CM

## 2022-07-16 DIAGNOSIS — J44.9 CHRONIC OBSTRUCTIVE PULMONARY DISEASE, UNSPECIFIED: ICD-10-CM

## 2022-07-16 DIAGNOSIS — Z01.818 ENCOUNTER FOR OTHER PREPROCEDURAL EXAMINATION: ICD-10-CM

## 2022-07-16 LAB
ANION GAP SERPL CALC-SCNC: 18 MMOL/L — HIGH (ref 5–17)
APTT BLD: 32.5 SEC — SIGNIFICANT CHANGE UP (ref 27.5–35.5)
APTT BLD: 58.5 SEC — HIGH (ref 27.5–35.5)
APTT BLD: 60.1 SEC — HIGH (ref 27.5–35.5)
APTT BLD: 69.6 SEC — HIGH (ref 27.5–35.5)
BUN SERPL-MCNC: 31 MG/DL — HIGH (ref 7–23)
CALCIUM SERPL-MCNC: 9.5 MG/DL — SIGNIFICANT CHANGE UP (ref 8.4–10.5)
CHLORIDE SERPL-SCNC: 95 MMOL/L — LOW (ref 96–108)
CO2 SERPL-SCNC: 22 MMOL/L — SIGNIFICANT CHANGE UP (ref 22–31)
CREAT SERPL-MCNC: 1.33 MG/DL — HIGH (ref 0.5–1.3)
EGFR: 55 ML/MIN/1.73M2 — LOW
GLUCOSE SERPL-MCNC: 141 MG/DL — HIGH (ref 70–99)
HCT VFR BLD CALC: 38.7 % — LOW (ref 39–50)
HGB BLD-MCNC: 13.4 G/DL — SIGNIFICANT CHANGE UP (ref 13–17)
MAGNESIUM SERPL-MCNC: 1.9 MG/DL — SIGNIFICANT CHANGE UP (ref 1.6–2.6)
MCHC RBC-ENTMCNC: 32.1 PG — SIGNIFICANT CHANGE UP (ref 27–34)
MCHC RBC-ENTMCNC: 34.6 GM/DL — SIGNIFICANT CHANGE UP (ref 32–36)
MCV RBC AUTO: 92.6 FL — SIGNIFICANT CHANGE UP (ref 80–100)
NRBC # BLD: 0 /100 WBCS — SIGNIFICANT CHANGE UP (ref 0–0)
PHOSPHATE SERPL-MCNC: 4 MG/DL — SIGNIFICANT CHANGE UP (ref 2.5–4.5)
PLATELET # BLD AUTO: 154 K/UL — SIGNIFICANT CHANGE UP (ref 150–400)
POTASSIUM SERPL-MCNC: 3.8 MMOL/L — SIGNIFICANT CHANGE UP (ref 3.5–5.3)
POTASSIUM SERPL-SCNC: 3.8 MMOL/L — SIGNIFICANT CHANGE UP (ref 3.5–5.3)
RBC # BLD: 4.18 M/UL — LOW (ref 4.2–5.8)
RBC # FLD: 13.7 % — SIGNIFICANT CHANGE UP (ref 10.3–14.5)
SODIUM SERPL-SCNC: 135 MMOL/L — SIGNIFICANT CHANGE UP (ref 135–145)
WBC # BLD: 7.63 K/UL — SIGNIFICANT CHANGE UP (ref 3.8–10.5)
WBC # FLD AUTO: 7.63 K/UL — SIGNIFICANT CHANGE UP (ref 3.8–10.5)

## 2022-07-16 PROCEDURE — 99223 1ST HOSP IP/OBS HIGH 75: CPT | Mod: GC

## 2022-07-16 PROCEDURE — 93970 EXTREMITY STUDY: CPT | Mod: 26

## 2022-07-16 PROCEDURE — 99223 1ST HOSP IP/OBS HIGH 75: CPT

## 2022-07-16 RX ORDER — CLONAZEPAM 1 MG
1 TABLET ORAL EVERY 6 HOURS
Refills: 0 | Status: DISCONTINUED | OUTPATIENT
Start: 2022-07-16 | End: 2022-07-18

## 2022-07-16 RX ORDER — POTASSIUM CHLORIDE 20 MEQ
20 PACKET (EA) ORAL ONCE
Refills: 0 | Status: COMPLETED | OUTPATIENT
Start: 2022-07-16 | End: 2022-07-16

## 2022-07-16 RX ADMIN — Medication 20 MILLIEQUIVALENT(S): at 07:28

## 2022-07-16 RX ADMIN — METHOCARBAMOL 500 MILLIGRAM(S): 500 TABLET, FILM COATED ORAL at 21:38

## 2022-07-16 RX ADMIN — METHOCARBAMOL 500 MILLIGRAM(S): 500 TABLET, FILM COATED ORAL at 06:49

## 2022-07-16 RX ADMIN — Medication 1 MILLIGRAM(S): at 21:43

## 2022-07-16 RX ADMIN — Medication 0.5 MILLIGRAM(S): at 06:15

## 2022-07-16 RX ADMIN — Medication 200 MILLIGRAM(S): at 06:49

## 2022-07-16 RX ADMIN — Medication 150 MILLIGRAM(S): at 21:36

## 2022-07-16 RX ADMIN — HEPARIN SODIUM 16 UNIT(S)/HR: 5000 INJECTION INTRAVENOUS; SUBCUTANEOUS at 01:26

## 2022-07-16 RX ADMIN — Medication 145 MILLIGRAM(S): at 13:27

## 2022-07-16 RX ADMIN — Medication 200 MILLIGRAM(S): at 01:01

## 2022-07-16 RX ADMIN — TAMSULOSIN HYDROCHLORIDE 0.4 MILLIGRAM(S): 0.4 CAPSULE ORAL at 21:38

## 2022-07-16 RX ADMIN — Medication 1 MILLIGRAM(S): at 09:45

## 2022-07-16 RX ADMIN — Medication 60 MILLIGRAM(S): at 07:01

## 2022-07-16 RX ADMIN — METHOCARBAMOL 500 MILLIGRAM(S): 500 TABLET, FILM COATED ORAL at 13:27

## 2022-07-16 RX ADMIN — Medication 200 MILLIGRAM(S): at 18:11

## 2022-07-16 RX ADMIN — Medication 100 MILLIGRAM(S): at 10:49

## 2022-07-16 RX ADMIN — Medication 100 MILLIGRAM(S): at 19:22

## 2022-07-16 RX ADMIN — ATORVASTATIN CALCIUM 40 MILLIGRAM(S): 80 TABLET, FILM COATED ORAL at 21:38

## 2022-07-16 RX ADMIN — Medication 81 MILLIGRAM(S): at 13:27

## 2022-07-16 RX ADMIN — OXYCODONE AND ACETAMINOPHEN 2 TABLET(S): 5; 325 TABLET ORAL at 23:32

## 2022-07-16 RX ADMIN — Medication 100 MILLIGRAM(S): at 13:28

## 2022-07-16 RX ADMIN — PANTOPRAZOLE SODIUM 40 MILLIGRAM(S): 20 TABLET, DELAYED RELEASE ORAL at 06:15

## 2022-07-16 NOTE — CONSULT NOTE ADULT - PROBLEM SELECTOR RECOMMENDATION 6
he was seen by several pulmonologist at [facility] and [facility] and was started on Trilogy but he is not using it on regular basis and also intermittently on JORDAN,  The base line oxygen saturation is normal.  He was told that he has mild to moderate COD on the PFT

## 2022-07-16 NOTE — CONSULT NOTE ADULT - SUBJECTIVE AND OBJECTIVE BOX
Patient is a 77y old  Male who presents with a chief complaint of Critical Limb Ischemia (16 Jul 2022 11:52)      HPI:  The pt is a 77M hx HTN, HLD, DM, Pacemaker, PVD, TAVR, c/o left leg pain x2 days. 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 Pt went to Urgent Care after pain started 2 days prior, he was then referred to the ED for further mgmt once it was established there was no flow to affected limb along with leg being mottled and cool. Pt was then seen at outside hospital in [place] and was told the stent was occluded, he was then seen by a vascular surgeon who did an angiogram one day prior and told pt the leg had to be amputated. Pt would like second a second opinion from Dr Capone.                     (15 Jul 2022 04:06)      PAST MEDICAL & SURGICAL HISTORY:  HTN (hypertension)      High cholesterol      DM (diabetes mellitus)      Popliteal aneurysm      Pacemaker      S/P TAVR (transcatheter aortic valve replacement)          FAMILY HISTORY:      SOCIAL HISTORY:  Smoking Status: [ ] Current, [ ] Former, [ ] Never  Pack Years:    MEDICATIONS:  Pulmonary:    Antimicrobials:    Anticoagulants:  aspirin enteric coated 81 milliGRAM(s) Oral daily  heparin  Infusion 1400 Unit(s)/Hr IV Continuous <Continuous>    Onc:    GI/:  pantoprazole    Tablet 40 milliGRAM(s) Oral before breakfast    Endocrine:  allopurinol 100 milliGRAM(s) Oral daily  atorvastatin 40 milliGRAM(s) Oral at bedtime  fenofibrate Tablet 145 milliGRAM(s) Oral daily    Cardiac:  NIFEdipine XL 60 milliGRAM(s) Oral daily  propranolol 10 milliGRAM(s) Oral three times a day  tamsulosin 0.4 milliGRAM(s) Oral at bedtime    Other Medications:  carBAMazepine 200 milliGRAM(s) Oral two times a day  clonazePAM  Tablet 1 milliGRAM(s) Oral every 6 hours PRN  methocarbamol 500 milliGRAM(s) Oral three times a day  oxycodone    5 mG/acetaminophen 325 mG 2 Tablet(s) Oral every 4 hours PRN  sodium chloride 0.9%. 1000 milliLiter(s) IV Continuous <Continuous>  traZODone 150 milliGRAM(s) Oral at bedtime  venlafaxine 100 milliGRAM(s) Oral two times a day with meals      Allergies    No Known Allergies    Intolerances        Review of Systems:   •	General: negative  •	Skin/Breast: negative  •	Ophthalmologic: negative  •	ENMT: negative  •	Respiratory and Thorax: negative  •	Cardiovascular: negative  •	Gastrointestinal: negative  •	Genitourinary: negative  •	Musculoskeletal: negative  •	Neurological: negative  •	Psychiatric: negative  •	Hematology/Lymphatics: negative  •	Endocrine: negative  •	Allergic/Immunologic: negative    Physical Exam:   • Constitutional:	refer to dietitian/nutritionist note  • Eyes:	EOMI; PERRL; no drainage or redness  • ENMT:	No oral lesions; no gross abnormalities  • Neck	No bruits; no thyromegaly or nodules  • Breasts:	not examined  • Back:	No deformity or limitation of movement  • Respiratory:	Breath Sounds equal & clear to percussion & auscultation, no accessory muscle use  • Cardiovascular:	Regular rate & rhythm, normal S1, S2; no murmurs, gallops or rubs; no S3, S4  • Gastrointestinal:	Soft, non-tender, no hepatosplenomegaly, normal bowel sounds  • Genitourinary:	not examined  • Rectal: not examined  • Extremities:	No cyanosis, clubbing or edema  • Vascular:	Equal and normal pulses (carotid, femoral, dorsalis pedis)  • Neurologica:l	not examined  • Skin:	No lesions; no rash  • Lymph Nodes:	No lymphadedenopathy  • Musculoskeletal:	No joint pain, swelling or deformity; no limitation of movement      Vital Signs Last 24 Hrs  T(C): 36.4 (16 Jul 2022 14:40), Max: 36.7 (15 Jul 2022 19:00)  T(F): 97.5 (16 Jul 2022 14:40), Max: 98.1 (15 Jul 2022 19:00)  HR: 60 (16 Jul 2022 13:40) (60 - 78)  BP: 149/76 (16 Jul 2022 13:40) (142/110 - 176/77)  BP(mean): 106 (16 Jul 2022 13:40) (95 - 135)  RR: 17 (16 Jul 2022 13:40) (14 - 30)  SpO2: 94% (16 Jul 2022 13:40) (92% - 97%)    Parameters below as of 16 Jul 2022 13:40  Patient On (Oxygen Delivery Method): room air        07-15 @ 07:01  -  07-16 @ 07:00  --------------------------------------------------------  IN: 560 mL / OUT: 700 mL / NET: -140 mL    07-16 @ 07:01  -  07-16 @ 15:02  --------------------------------------------------------  IN: 0 mL / OUT: 200 mL / NET: -200 mL          LABS:      CBC Full  -  ( 16 Jul 2022 05:30 )  WBC Count : 7.63 K/uL  RBC Count : 4.18 M/uL  Hemoglobin : 13.4 g/dL  Hematocrit : 38.7 %  Platelet Count - Automated : 154 K/uL  Mean Cell Volume : 92.6 fl  Mean Cell Hemoglobin : 32.1 pg  Mean Cell Hemoglobin Concentration : 34.6 gm/dL  Auto Neutrophil # : x  Auto Lymphocyte # : x  Auto Monocyte # : x  Auto Eosinophil # : x  Auto Basophil # : x  Auto Neutrophil % : x  Auto Lymphocyte % : x  Auto Monocyte % : x  Auto Eosinophil % : x  Auto Basophil % : x    07-16    135  |  95<L>  |  31<H>  ----------------------------<  141<H>  3.8   |  22  |  1.33<H>    Ca    9.5      16 Jul 2022 05:30  Phos  4.0     07-16  Mg     1.9     07-16    TPro  7.5  /  Alb  4.5  /  TBili  0.7  /  DBili  x   /  AST  119<H>  /  ALT  42  /  AlkPhos  61  07-15    PT/INR - ( 15 Jul 2022 02:43 )   PT: 13.3 sec;   INR: 1.12          PTT - ( 16 Jul 2022 12:00 )  PTT:60.1 sec  < from: Xray Chest 1 View-PORTABLE IMMEDIATE (Xray Chest 1 View-PORTABLE IMMEDIATE .) (07.15.22 @ 02:12) >  ACC: [id number] EXAM:  XR CHEST PORTABLE IMMED 1V                          PROCEDURE DATE:  07/15/2022          INTERPRETATION:  Clinical history/reason for exam: Preop.    Frontal chest.    No comparison.    Findings/  impression: Satisfactory position right ICD. Cardiomegaly, thoracic   aortic calcification, TAVR. Lungs and mediastinum are unremarkable.   Bilateral shoulder degenerative changes.    < end of copied text >  < from: US Duplex Venous Lower Ext Complete, Bilateral (07.16.22 @ 12:43) >  IMPRESSION:  No evidence of deep venous thrombosis in either lower extremity.    Vein mapping as above.    Occluded distal left femoral and popliteal arteries.    < end of copied text >                  RADIOLOGY & ADDITIONAL STUDIES (The following images were personally reviewed):

## 2022-07-16 NOTE — CONSULT NOTE ADULT - ATTENDING COMMENTS
Pt is a 76 y/o man c HTN, HLP, DM, PPM, PVD, COPD found c severe PVD, limb ischemia for vascular procedure.    Agree with the assessment that pt is at mod risk for a mod risk procedure  Since pt with recent -ve stress and s symptoms  agree to get collateral cardiac information, but will not impede pt going for vascular procedure.

## 2022-07-16 NOTE — CONSULT NOTE ADULT - PROBLEM SELECTOR RECOMMENDATION 7
ECHO was obtained from his cardiologist dated 5/22 and consistent with LVH and diastolic dysfunction.  Avoid dehydration and volume depletion.  Cardio consult.  He is on Propranolol and will discuss with cardio

## 2022-07-16 NOTE — CONSULT NOTE ADULT - PROBLEM SELECTOR RECOMMENDATION 5
The patient's medical condition is optimized for surgery.  There is no contraindication for surgery.  There is no clinical evidence neither of angina, decompensated CHF, arrhthymias, nor valvular disease.   There is no limitation of exercise capacity.  MET is 5 .  ASA class is3 .  Real cardiac risk factor is high.  DVT prophylaxis is indicated  he is on full AC  venous doppler negative for DVT.  Pain control.  Early mobilization.  Avoid fluid overload.

## 2022-07-16 NOTE — CONSULT NOTE ADULT - PROBLEM SELECTOR RECOMMENDATION 9
the patient is on Nifedipine and propranolol.  BP is controlled received fluid bolus and will follow on cr not on diuretics

## 2022-07-16 NOTE — CONSULT NOTE ADULT - SUBJECTIVE AND OBJECTIVE BOX
Pt seen and evaluated at bedside.  In brief summary the pt is a  77M hx HTN, HLD, DM, Pacemaker, PVD, TAVR, c/o left leg pain x2 days. Pt has hx of LLE popliteal aneurysm s/p stent placement.  Pt went to Urgent Care and he was then referred to the ED for further mgmt once it was established there was no flow to affected limb. Pt wasseen at outside hospital in [place] and was told the stent was occluded, he was then seen by a vascular surgeon who did an angiogram and  told pt the leg had to be amputated. Pt came to [facility] to get a second opinion.       PAST MEDICAL & SURGICAL HISTORY:  HTN (hypertension)  High cholesterol  DM (diabetes mellitus)  Popliteal aneurysm  Pacemaker  S/P TAVR (transcatheter aortic valve replacement)          Home Medications:  allopurinol 100 mg oral tablet: 1 tab(s) orally once a day (15 Jul 2022 04:24)  atorvastatin 40 mg oral tablet: 1 tab(s) orally once a day (15 Jul 2022 04:24)  carBAMazepine 200 mg oral tablet: 1 tab(s) orally 2 times a day (15 Jul 2022 04:24)  chlorthalidone 25 mg oral tablet: 1 tab(s) orally once a day (15 Jul 2022 04:24)  Eliquis 5 mg oral tablet: 1 tab(s) orally 2 times a day (15 Jul 2022 04:24)  fenofibrate 145 mg oral tablet: 1 tab(s) orally once a day (15 Jul 2022 04:24)  Flomax 0.4 mg oral capsule: 0.4 cap(s) orally once a day (15 Jul 2022 04:24)  Icosapent Ethyl 1 g oral capsule: 2 cap(s) orally 2 times a day (15 Jul 2022 04:24)  losartan 100 mg oral tablet: 1 tab(s) orally once a day (15 Jul 2022 04:24)  NIFEdipine 60 mg oral tablet, extended release: 1 tab(s) orally once a day (15 Jul 2022 04:24)  pantoprazole 40 mg oral delayed release tablet: 1 tab(s) orally once a day (15 Jul 2022 04:24)  propranolol 10 mg oral tablet: 1 tab(s) orally 3 times a day (15 Jul 2022 04:24)  traZODone 150 mg oral tablet: 2 tab(s) orally once (at bedtime) (15 Jul 2022 04:24)  venlafaxine 37.5 mg oral tablet: 1 tab(s) orally 2 times a day (15 Jul 2022 04:24)      Allergies: No Known Allergies      CURRENT MEDICATIONS:   allopurinol 100 milliGRAM(s) Oral daily  aspirin enteric coated 81 milliGRAM(s) Oral daily  atorvastatin 40 milliGRAM(s) Oral at bedtime  carBAMazepine 200 milliGRAM(s) Oral two times a day  clonazePAM  Tablet 1 milliGRAM(s) Oral every 6 hours PRN  fenofibrate Tablet 145 milliGRAM(s) Oral daily  heparin  Infusion 1400 Unit(s)/Hr IV Continuous <Continuous>  methocarbamol 500 milliGRAM(s) Oral three times a day  NIFEdipine XL 60 milliGRAM(s) Oral daily  oxycodone    5 mG/acetaminophen 325 mG 2 Tablet(s) Oral every 4 hours PRN  pantoprazole    Tablet 40 milliGRAM(s) Oral before breakfast  propranolol 10 milliGRAM(s) Oral three times a day  sodium chloride 0.9%. 1000 milliLiter(s) IV Continuous <Continuous>  tamsulosin 0.4 milliGRAM(s) Oral at bedtime  traZODone 150 milliGRAM(s) Oral at bedtime  venlafaxine 100 milliGRAM(s) Oral two times a day with meals      VITAL SIGNS, INS/OUTS (last 24 hours):  Vital Signs Last 24 Hrs  T(C): 36.2 (16 Jul 2022 10:57), Max: 36.7 (15 Jul 2022 19:00)  T(F): 97.1 (16 Jul 2022 10:57), Max: 98.1 (15 Jul 2022 19:00)  HR: 65 (16 Jul 2022 09:30) (60 - 94)  BP: 160/72 (16 Jul 2022 09:30) (142/110 - 207/971)  BP(mean): 103 (16 Jul 2022 09:30) (95 - 138)  RR: 18 (16 Jul 2022 09:30) (14 - 30)  SpO2: 94% (16 Jul 2022 09:30) (92% - 97%)      PHYSICAL EXAM:  NAD ; family at bedside  CTA b/l no wheezing or crackles   Nl S1,S2 no mumurs  soft NT/ND   LLE : mottled skin     BASIC LABS:                        13.4   7.63  )-----------( 154      ( 16 Jul 2022 05:30 )             38.7     07-16    135  |  95<L>  |  31<H>  ----------------------------<  141<H>  3.8   |  22  |  1.33<H>    Ca    9.5      16 Jul 2022 05:30  Phos  4.0     07-16  Mg     1.9     07-16    TPro  7.5  /  Alb  4.5  /  TBili  0.7  /  DBili  x   /  AST  119<H>  /  ALT  42  /  AlkPhos  61  07-15    PT/INR - ( 15 Jul 2022 02:43 )   PT: 13.3 sec;   INR: 1.12          PTT - ( 16 Jul 2022 05:30 )  PTT:58.5 sec    CAPILLARY BLOOD GLUCOSE  POCT Blood Glucose.: 128 mg/dL (15 Jul 2022 14:06)      7/15 CXR:  Satisfactory position right ICD. Cardiomegaly, thoracic   aortic calcification, TAVR. Lungs and mediastinum are unremarkable.   Bilateral shoulder degenerative changes.    MEDICATIONS  (STANDING):  allopurinol 100 milliGRAM(s) Oral daily  aspirin enteric coated 81 milliGRAM(s) Oral daily  atorvastatin 40 milliGRAM(s) Oral at bedtime  carBAMazepine 200 milliGRAM(s) Oral two times a day  fenofibrate Tablet 145 milliGRAM(s) Oral daily  heparin  Infusion 1400 Unit(s)/Hr (16 mL/Hr) IV Continuous <Continuous>  methocarbamol 500 milliGRAM(s) Oral three times a day  NIFEdipine XL 60 milliGRAM(s) Oral daily  pantoprazole    Tablet 40 milliGRAM(s) Oral before breakfast  propranolol 10 milliGRAM(s) Oral three times a day  sodium chloride 0.9%. 1000 milliLiter(s) (100 mL/Hr) IV Continuous <Continuous>  tamsulosin 0.4 milliGRAM(s) Oral at bedtime  traZODone 150 milliGRAM(s) Oral at bedtime  venlafaxine 100 milliGRAM(s) Oral two times a day with meals        A/P: 77y.o M Hx HTN, HLD, DM, Pacemaker, Afib and PVD presents with LLE Critical limb ischemia s/p diagnostic angiogram 7/15.     # LLE Critical limb ischemia  -Pt is s/p diagnostic angiogram 7/15 unable to cross occluded popliteal stent  -Continue heparin gtt  -Pt is  for bypass Monday 7/18    #HTN  #HLD  #Hx of Pacemaker placement  #Hx of Afib   -Continue nifedipine  -Continue home statin + fenofibrate    #Bipolar Disorder  Continue home meds as documented above       Dispo: As per vascular surgery team plan for vein mapping and OR on Monday 7/18

## 2022-07-16 NOTE — CONSULT NOTE ADULT - TIME BILLING
Pt is a 76 y/o man c HTN, HLP, DM, PPM, PVD, COPD found c severe PVD, limb ischemia for vascular procedure.
Patient seen and examined with house-staff during bedside rounds.  Resident note read, including vitals, physical findings, laboratory data, and radiological reports.   Revisions included below.  Direct personal management at bed side and extensive interpretation of the data.  Plan was outlined and discussed in details with the housestaff.  Decision making of high complexity  Action taken for acute disease activity to reflect the level of care provided:  - medication reconciliation  - review laboratory data  Discussed with family

## 2022-07-16 NOTE — CONSULT NOTE ADULT - SUBJECTIVE AND OBJECTIVE BOX
HPI  77M hx HTN, HLD, DM, Pacemaker, PVD, TAVR, COPD c/o left leg pain x2 days found to have an occluded popliteal stent w/ chronic limb ischemia         ROS: A 10-point review of systems was otherwise negative.    PAST MEDICAL & SURGICAL HISTORY:  HTN (hypertension)  High cholesterol  DM (diabetes mellitus)  Popliteal aneurysm  Pacemaker  S/P TAVR (transcatheter aortic valve replacement)          SOCIAL HISTORY:  FAMILY HISTORY:      ALLERGIES: 	  No Known Allergies      MEDICATIONS:  allopurinol 100 milliGRAM(s) Oral daily  aspirin enteric coated 81 milliGRAM(s) Oral daily  atorvastatin 40 milliGRAM(s) Oral at bedtime  carBAMazepine 200 milliGRAM(s) Oral two times a day  clonazePAM  Tablet 1 milliGRAM(s) Oral every 6 hours PRN  fenofibrate Tablet 145 milliGRAM(s) Oral daily  heparin  Infusion 1400 Unit(s)/Hr IV Continuous <Continuous>  methocarbamol 500 milliGRAM(s) Oral three times a day  NIFEdipine XL 60 milliGRAM(s) Oral daily  oxycodone    5 mG/acetaminophen 325 mG 2 Tablet(s) Oral every 4 hours PRN  pantoprazole    Tablet 40 milliGRAM(s) Oral before breakfast  propranolol 10 milliGRAM(s) Oral three times a day  sodium chloride 0.9%. 1000 milliLiter(s) IV Continuous <Continuous>  tamsulosin 0.4 milliGRAM(s) Oral at bedtime  traZODone 150 milliGRAM(s) Oral at bedtime  venlafaxine 100 milliGRAM(s) Oral two times a day with meals      PHYSICAL EXAM:  T(C): 36.4 (07-16-22 @ 14:40), Max: 36.7 (07-15-22 @ 19:00)  HR: 61 (07-16-22 @ 16:50) (60 - 76)  BP: 160/73 (07-16-22 @ 16:50) (142/110 - 173/80)  RR: 18 (07-16-22 @ 16:50) (16 - 30)  SpO2: 100% (07-16-22 @ 16:50) (92% - 100%)  Wt(kg): --    GEN: Awake, comfortable. NAD.   HEENT: NCAT, PERRL, EOMI. Mucosa moist. No JVD.   RESP: CTA b/l  CV: RRR, normal s1/s2. No m/r/g.  ABD: Soft, NTND. BS+  EXT: Warm. No edema, clubbing, or cyanosis.   NEURO: AAOx3. No focal deficits.    I&O's Summary    15 Jul 2022 07:01  -  16 Jul 2022 07:00  --------------------------------------------------------  IN: 560 mL / OUT: 700 mL / NET: -140 mL    16 Jul 2022 07:01  -  16 Jul 2022 17:54  --------------------------------------------------------  IN: 0 mL / OUT: 200 mL / NET: -200 mL      Height (cm): 177.8 (07-16 @ 05:59)  Weight (kg): 85 (07-16 @ 05:59)  BMI (kg/m2): 26.9 (07-16 @ 05:59)  BSA (m2): 2.03 (07-16 @ 05:59)  	  LABS:	 	    CARDIAC MARKERS:                          13.4   7.63  )-----------( 154      ( 16 Jul 2022 05:30 )             38.7     07-16    135  |  95<L>  |  31<H>  ----------------------------<  141<H>  3.8   |  22  |  1.33<H>    Ca    9.5      16 Jul 2022 05:30  Phos  4.0     07-16  Mg     1.9     07-16    TPro  7.5  /  Alb  4.5  /  TBili  0.7  /  DBili  x   /  AST  119<H>  /  ALT  42  /  AlkPhos  61  07-15    proBNP:   Lipid Profile:   HgA1c:   TSH:     TELEMETRY: 	    ECG:  	  RADIOLOGY:   ECHO:  STRESS:  CATH:    A/P:    77M hx HTN, HLD, DM, Afib, Pacemaker, PVD, TAVR, COPD c/o left leg pain x2 days found to have an occluded popliteal stent w/ chronic limb ischemia for pre op CV evaluation    #Pre-op:  -obtain cardiology records from outside  -ok to hold losartan if desired pre op    #Afib: will be on heparin drip. Hold apixaban at least 48 hours prior to surgery  #HTN: cont current regimen of nifedipine, atorvastatin, chlorthalidone, propranolol    #PPM: If electrocautery will be used may need to change PPM to appropriate mode for duration of surgery    #PVD/HLD:  c/w atorvastatin 40mg, vacepa, fenofibrate         HPI  77M hx HTN, HLD, DM, Pacemaker, PVD, TAVR, COPD c/o left leg pain x2 days found to have an occluded popliteal stent w/ chronic limb ischemia     Reports he is able to walk about 1 block but limited by dyspnea. Had recent stress test 3 mos ago reports was normal.   Otherwise denies orthopnea, pnd, LE edema, palpitation or chest discomfort      ROS: A 10-point review of systems was otherwise negative.    PAST MEDICAL & SURGICAL HISTORY:  HTN (hypertension)  High cholesterol  DM (diabetes mellitus)  Popliteal aneurysm  Pacemaker  S/P TAVR (transcatheter aortic valve replacement)          SOCIAL HISTORY: remote ex smoker 50 yrs ago  FAMILY HISTORY: no prematre ascvd      ALLERGIES: 	  No Known Allergies      MEDICATIONS:  allopurinol 100 milliGRAM(s) Oral daily  aspirin enteric coated 81 milliGRAM(s) Oral daily  atorvastatin 40 milliGRAM(s) Oral at bedtime  carBAMazepine 200 milliGRAM(s) Oral two times a day  clonazePAM  Tablet 1 milliGRAM(s) Oral every 6 hours PRN  fenofibrate Tablet 145 milliGRAM(s) Oral daily  heparin  Infusion 1400 Unit(s)/Hr IV Continuous <Continuous>  methocarbamol 500 milliGRAM(s) Oral three times a day  NIFEdipine XL 60 milliGRAM(s) Oral daily  oxycodone    5 mG/acetaminophen 325 mG 2 Tablet(s) Oral every 4 hours PRN  pantoprazole    Tablet 40 milliGRAM(s) Oral before breakfast  propranolol 10 milliGRAM(s) Oral three times a day  sodium chloride 0.9%. 1000 milliLiter(s) IV Continuous <Continuous>  tamsulosin 0.4 milliGRAM(s) Oral at bedtime  traZODone 150 milliGRAM(s) Oral at bedtime  venlafaxine 100 milliGRAM(s) Oral two times a day with meals      PHYSICAL EXAM:  T(C): 36.4 (07-16-22 @ 14:40), Max: 36.7 (07-15-22 @ 19:00)  HR: 61 (07-16-22 @ 16:50) (60 - 76)  BP: 160/73 (07-16-22 @ 16:50) (142/110 - 173/80)  RR: 18 (07-16-22 @ 16:50) (16 - 30)  SpO2: 100% (07-16-22 @ 16:50) (92% - 100%)  Wt(kg): --    GEN: Awake, comfortable. NAD.   HEENT: No JVD.   RESP: CTA b/l  CV: RRR, normal s1/s2. ejection systolic murmur  ABD: Soft, NTND. BS+  EXT: Warm. No edema, bilateral mottling of LE   NEURO: AAOx3.    I&O's Summary    15 Jul 2022 07:01  -  16 Jul 2022 07:00  --------------------------------------------------------  IN: 560 mL / OUT: 700 mL / NET: -140 mL    16 Jul 2022 07:01  -  16 Jul 2022 17:54  --------------------------------------------------------  IN: 0 mL / OUT: 200 mL / NET: -200 mL      Height (cm): 177.8 (07-16 @ 05:59)  Weight (kg): 85 (07-16 @ 05:59)  BMI (kg/m2): 26.9 (07-16 @ 05:59)  BSA (m2): 2.03 (07-16 @ 05:59)  	  LABS:	 	    CARDIAC MARKERS:                          13.4   7.63  )-----------( 154      ( 16 Jul 2022 05:30 )             38.7     07-16    135  |  95<L>  |  31<H>  ----------------------------<  141<H>  3.8   |  22  |  1.33<H>    Ca    9.5      16 Jul 2022 05:30  Phos  4.0     07-16  Mg     1.9     07-16    TPro  7.5  /  Alb  4.5  /  TBili  0.7  /  DBili  x   /  AST  119<H>  /  ALT  42  /  AlkPhos  61  07-15    proBNP:   Lipid Profile:   HgA1c:   TSH:     TELEMETRY: 	    ECG:  	  RADIOLOGY:   ECHO:   STRESS:  CATH:    A/P:    77M hx HTN, HLD, DM, Afib, Pacemaker, PVD, TAVR, COPD c/o left leg pain x2 days found to have an occluded popliteal stent w/ chronic limb ischemia for pre op CV evaluation  This patient is an elevated CV risk for this moderate risk procedure.    #Pre-op:  -obtain cardiology records from Dr Trino Mac [facility] including results of recent stress test.   -ok to hold losartan if desired pre op  -Advise continuing aspirin in the wali op period if able      #Afib: will be on heparin drip. Holding apixaban  #HTN: cont current regimen of nifedipine, atorvastatin, chlorthalidone, propranolol    #PPM: If electrocautery will be used may need to change PPM to appropriate mode for duration of surgery, pt appears to be primarily V paced with occasional PVCs    #PVD/HLD:  c/w atorvastatin 40mg, vacepa, fenofibrate      will d/w attending    Cardiology fellow       HPI  77M hx HTN, HLD, DM, Pacemaker, PVD, TAVR, COPD c/o left leg pain x2 days found to have an occluded popliteal stent w/ chronic limb ischemia     Reports he is able to walk about 1 block but limited by dyspnea. Had recent stress test 3 mos ago reports was normal.   Otherwise denies orthopnea, pnd, LE edema, palpitation or chest discomfort      ROS: A 10-point review of systems was otherwise negative.    PAST MEDICAL & SURGICAL HISTORY:  HTN (hypertension)  High cholesterol  DM (diabetes mellitus)  Popliteal aneurysm  Pacemaker  S/P TAVR (transcatheter aortic valve replacement)          SOCIAL HISTORY: remote ex smoker 50 yrs ago  FAMILY HISTORY: no prematre ascvd      ALLERGIES: 	  No Known Allergies      MEDICATIONS:  allopurinol 100 milliGRAM(s) Oral daily  aspirin enteric coated 81 milliGRAM(s) Oral daily  atorvastatin 40 milliGRAM(s) Oral at bedtime  carBAMazepine 200 milliGRAM(s) Oral two times a day  clonazePAM  Tablet 1 milliGRAM(s) Oral every 6 hours PRN  fenofibrate Tablet 145 milliGRAM(s) Oral daily  heparin  Infusion 1400 Unit(s)/Hr IV Continuous <Continuous>  methocarbamol 500 milliGRAM(s) Oral three times a day  NIFEdipine XL 60 milliGRAM(s) Oral daily  oxycodone    5 mG/acetaminophen 325 mG 2 Tablet(s) Oral every 4 hours PRN  pantoprazole    Tablet 40 milliGRAM(s) Oral before breakfast  propranolol 10 milliGRAM(s) Oral three times a day  sodium chloride 0.9%. 1000 milliLiter(s) IV Continuous <Continuous>  tamsulosin 0.4 milliGRAM(s) Oral at bedtime  traZODone 150 milliGRAM(s) Oral at bedtime  venlafaxine 100 milliGRAM(s) Oral two times a day with meals      PHYSICAL EXAM:  T(C): 36.4 (07-16-22 @ 14:40), Max: 36.7 (07-15-22 @ 19:00)  HR: 61 (07-16-22 @ 16:50) (60 - 76)  BP: 160/73 (07-16-22 @ 16:50) (142/110 - 173/80)  RR: 18 (07-16-22 @ 16:50) (16 - 30)  SpO2: 100% (07-16-22 @ 16:50) (92% - 100%)  Wt(kg): --    GEN: Awake, comfortable. NAD.   HEENT: No JVD.   RESP: CTA b/l  CV: RRR, normal s1/s2. ejection systolic murmur  ABD: Soft, NTND. BS+  EXT: Warm. No edema, bilateral mottling of LE   NEURO: AAOx3.    I&O's Summary    15 Jul 2022 07:01  -  16 Jul 2022 07:00  --------------------------------------------------------  IN: 560 mL / OUT: 700 mL / NET: -140 mL    16 Jul 2022 07:01  -  16 Jul 2022 17:54  --------------------------------------------------------  IN: 0 mL / OUT: 200 mL / NET: -200 mL      Height (cm): 177.8 (07-16 @ 05:59)  Weight (kg): 85 (07-16 @ 05:59)  BMI (kg/m2): 26.9 (07-16 @ 05:59)  BSA (m2): 2.03 (07-16 @ 05:59)  	  LABS:	 	    CARDIAC MARKERS:                          13.4   7.63  )-----------( 154      ( 16 Jul 2022 05:30 )             38.7     07-16    135  |  95<L>  |  31<H>  ----------------------------<  141<H>  3.8   |  22  |  1.33<H>    Ca    9.5      16 Jul 2022 05:30  Phos  4.0     07-16  Mg     1.9     07-16    TPro  7.5  /  Alb  4.5  /  TBili  0.7  /  DBili  x   /  AST  119<H>  /  ALT  42  /  AlkPhos  61  07-15    proBNP:   Lipid Profile:   HgA1c:   TSH:     TELEMETRY: 	    ECG:  	V paced   RADIOLOGY:   ECHO: EF hyperdynamic >75%, moderate LVH, severe LA dilation, normal TAVR function, RVSP 29   STRESS:  CATH:    A/P:    77M hx HTN, HLD, DM, Afib, Pacemaker, PVD, TAVR, COPD c/o left leg pain x2 days found to have an occluded popliteal stent w/ chronic limb ischemia for pre op CV evaluation    This patient is an elevated CV risk for this moderate risk procedure.    #Pre-op:  -obtain cardiology records from Dr Trino Mac [facility] including results of recent stress test.   -ok to hold losartan if desired pre op  No cardiac contraindication to proceed with surgery and patient is optimized. c/w aspirin through surgery if acceptable to surgical team    #Afib: will be on heparin drip. Holding apixaban  #HTN: cont current regimen of nifedipine, atorvastatin, chlorthalidone, propranolol    #PPM: If electrocautery will be used may need to change PPM to appropriate mode for duration of surgery, pt appears to be primarily V paced with occasional PVCs    #PVD/HLD:  c/w atorvastatin 40mg, vacepa, fenofibrate      will d/w attending    Cardiology fellow       HPI  77M hx HTN, HLD, DM, Pacemaker, PVD, TAVR, COPD c/o left leg pain x2 days found to have an occluded popliteal stent w/ chronic limb ischemia     Reports he is able to walk about 1 block but limited by dyspnea. Had recent stress test 3 mos ago reports was normal.   Otherwise denies orthopnea, pnd, LE edema, palpitation or chest discomfort      ROS: A 10-point review of systems was otherwise negative.    PAST MEDICAL & SURGICAL HISTORY:  HTN (hypertension)  High cholesterol  DM (diabetes mellitus)  Popliteal aneurysm  Pacemaker  S/P TAVR (transcatheter aortic valve replacement)          SOCIAL HISTORY: remote ex smoker 50 yrs ago  FAMILY HISTORY: no prematre ascvd      ALLERGIES: 	  No Known Allergies      MEDICATIONS:  allopurinol 100 milliGRAM(s) Oral daily  aspirin enteric coated 81 milliGRAM(s) Oral daily  atorvastatin 40 milliGRAM(s) Oral at bedtime  carBAMazepine 200 milliGRAM(s) Oral two times a day  clonazePAM  Tablet 1 milliGRAM(s) Oral every 6 hours PRN  fenofibrate Tablet 145 milliGRAM(s) Oral daily  heparin  Infusion 1400 Unit(s)/Hr IV Continuous <Continuous>  methocarbamol 500 milliGRAM(s) Oral three times a day  NIFEdipine XL 60 milliGRAM(s) Oral daily  oxycodone    5 mG/acetaminophen 325 mG 2 Tablet(s) Oral every 4 hours PRN  pantoprazole    Tablet 40 milliGRAM(s) Oral before breakfast  propranolol 10 milliGRAM(s) Oral three times a day  sodium chloride 0.9%. 1000 milliLiter(s) IV Continuous <Continuous>  tamsulosin 0.4 milliGRAM(s) Oral at bedtime  traZODone 150 milliGRAM(s) Oral at bedtime  venlafaxine 100 milliGRAM(s) Oral two times a day with meals      PHYSICAL EXAM:  T(C): 36.4 (07-16-22 @ 14:40), Max: 36.7 (07-15-22 @ 19:00)  HR: 61 (07-16-22 @ 16:50) (60 - 76)  BP: 160/73 (07-16-22 @ 16:50) (142/110 - 173/80)  RR: 18 (07-16-22 @ 16:50) (16 - 30)  SpO2: 100% (07-16-22 @ 16:50) (92% - 100%)  Wt(kg): --    GEN: Awake, comfortable. NAD.   HEENT: No JVD.   RESP: CTA b/l  CV: RRR, normal s1/s2. ejection systolic murmur  ABD: Soft, NTND. BS+  EXT: Warm. No edema, bilateral mottling of LE   NEURO: AAOx3.    I&O's Summary    15 Jul 2022 07:01  -  16 Jul 2022 07:00  --------------------------------------------------------  IN: 560 mL / OUT: 700 mL / NET: -140 mL    16 Jul 2022 07:01  -  16 Jul 2022 17:54  --------------------------------------------------------  IN: 0 mL / OUT: 200 mL / NET: -200 mL      Height (cm): 177.8 (07-16 @ 05:59)  Weight (kg): 85 (07-16 @ 05:59)  BMI (kg/m2): 26.9 (07-16 @ 05:59)  BSA (m2): 2.03 (07-16 @ 05:59)  	  LABS:	 	    CARDIAC MARKERS:                          13.4   7.63  )-----------( 154      ( 16 Jul 2022 05:30 )             38.7     07-16    135  |  95<L>  |  31<H>  ----------------------------<  141<H>  3.8   |  22  |  1.33<H>    Ca    9.5      16 Jul 2022 05:30  Phos  4.0     07-16  Mg     1.9     07-16    TPro  7.5  /  Alb  4.5  /  TBili  0.7  /  DBili  x   /  AST  119<H>  /  ALT  42  /  AlkPhos  61  07-15    proBNP:   Lipid Profile:   HgA1c:   TSH:     TELEMETRY: 	    ECG:  	V paced   RADIOLOGY:   ECHO: EF hyperdynamic >75%, moderate LVH, severe LA dilation, normal TAVR function, RVSP 29   STRESS:  CATH:    A/P:    77M hx HTN, HLD, DM, Afib, Pacemaker, PVD, TAVR, COPD c/o left leg pain x2 days found to have an occluded popliteal stent w/ chronic limb ischemia for pre op CV evaluation    This patient is an elevated CV risk for this moderate risk procedure.    #Pre-op:  -obtain cardiology records from Dr Trino Mac [facility] including results of recent stress test.   -ok to hold losartan if desired pre op  No cardiac contraindication to proceed with surgery and patient is optimized. c/w aspirin through surgery if acceptable to surgical team    #Afib: will be on heparin drip. Holding apixaban  #HTN: cont current regimen of nifedipine, atorvastatin, chlorthalidone, propranolol    #PPM ([facility]): If electrocautery will be used may need to change PPM to appropriate mode for duration of surgery, pt appears to be primarily V paced with occasional PVCs    #PVD/HLD:  c/w atorvastatin 40mg, vacepa, fenofibrate      will d/w attending    Cardiology fellow       HPI  77M hx HTN, HLD, DM, Pacemaker, PVD, TAVR, COPD c/o left leg pain x2 days found to have an occluded popliteal stent w/ chronic limb ischemia     Reports he is able to walk about 1 block but limited by dyspnea. Had recent stress test 3 mos ago reports was normal.   Otherwise denies orthopnea, pnd, LE edema, palpitation or chest discomfort      ROS: A 10-point review of systems was otherwise negative.    PAST MEDICAL & SURGICAL HISTORY:  HTN (hypertension)  High cholesterol  DM (diabetes mellitus)  Popliteal aneurysm  Pacemaker  S/P TAVR (transcatheter aortic valve replacement)          SOCIAL HISTORY: remote ex smoker 50 yrs ago  FAMILY HISTORY: no prematre ascvd      ALLERGIES: 	  No Known Allergies      MEDICATIONS:  allopurinol 100 milliGRAM(s) Oral daily  aspirin enteric coated 81 milliGRAM(s) Oral daily  atorvastatin 40 milliGRAM(s) Oral at bedtime  carBAMazepine 200 milliGRAM(s) Oral two times a day  clonazePAM  Tablet 1 milliGRAM(s) Oral every 6 hours PRN  fenofibrate Tablet 145 milliGRAM(s) Oral daily  heparin  Infusion 1400 Unit(s)/Hr IV Continuous <Continuous>  methocarbamol 500 milliGRAM(s) Oral three times a day  NIFEdipine XL 60 milliGRAM(s) Oral daily  oxycodone    5 mG/acetaminophen 325 mG 2 Tablet(s) Oral every 4 hours PRN  pantoprazole    Tablet 40 milliGRAM(s) Oral before breakfast  propranolol 10 milliGRAM(s) Oral three times a day  sodium chloride 0.9%. 1000 milliLiter(s) IV Continuous <Continuous>  tamsulosin 0.4 milliGRAM(s) Oral at bedtime  traZODone 150 milliGRAM(s) Oral at bedtime  venlafaxine 100 milliGRAM(s) Oral two times a day with meals      PHYSICAL EXAM:  T(C): 36.4 (07-16-22 @ 14:40), Max: 36.7 (07-15-22 @ 19:00)  HR: 61 (07-16-22 @ 16:50) (60 - 76)  BP: 160/73 (07-16-22 @ 16:50) (142/110 - 173/80)  RR: 18 (07-16-22 @ 16:50) (16 - 30)  SpO2: 100% (07-16-22 @ 16:50) (92% - 100%)  Wt(kg): --    GEN: Awake, comfortable. NAD.   HEENT: No JVD.   RESP: CTA b/l  CV: RRR, normal s1/s2. ejection systolic murmur  ABD: Soft, NTND. BS+  EXT: Warm. No edema, bilateral mottling of LE   NEURO: AAOx3.    I&O's Summary    15 Jul 2022 07:01  -  16 Jul 2022 07:00  --------------------------------------------------------  IN: 560 mL / OUT: 700 mL / NET: -140 mL    16 Jul 2022 07:01  -  16 Jul 2022 17:54  --------------------------------------------------------  IN: 0 mL / OUT: 200 mL / NET: -200 mL      Height (cm): 177.8 (07-16 @ 05:59)  Weight (kg): 85 (07-16 @ 05:59)  BMI (kg/m2): 26.9 (07-16 @ 05:59)  BSA (m2): 2.03 (07-16 @ 05:59)  	  LABS:	 	    CARDIAC MARKERS:                          13.4   7.63  )-----------( 154      ( 16 Jul 2022 05:30 )             38.7     07-16    135  |  95<L>  |  31<H>  ----------------------------<  141<H>  3.8   |  22  |  1.33<H>    Ca    9.5      16 Jul 2022 05:30  Phos  4.0     07-16  Mg     1.9     07-16    TPro  7.5  /  Alb  4.5  /  TBili  0.7  /  DBili  x   /  AST  119<H>  /  ALT  42  /  AlkPhos  61  07-15    proBNP:   Lipid Profile:   HgA1c:   TSH:     TELEMETRY: 	    ECG:  	V paced   RADIOLOGY:   ECHO: EF hyperdynamic >75%, moderate LVH, severe LA dilation, normal TAVR function, RVSP 29   STRESS:  CATH:    A/P:    77M hx HTN, HLD, DM, Afib, Pacemaker, PVD, TAVR, COPD c/o left leg pain x2 days found to have an occluded popliteal stent w/ chronic limb ischemia for pre op CV evaluation    This patient is an elevated CV risk for this moderate risk procedure.    #Pre-op:  -obtain cardiology records from Dr Trino Mac [facility] including results of recent stress test.   -ok to hold losartan if desired pre op  c/w aspirin through surgery if acceptable to surgical team    #Afib: will be on heparin drip. Holding apixaban  #HTN: cont current regimen of nifedipine, atorvastatin, chlorthalidone, propranolol    #PPM ([facility]): If electrocautery will be used may need to change PPM to appropriate mode for duration of surgery, pt appears to be primarily V paced with occasional PVCs    #PVD/HLD:  c/w atorvastatin 40mg, vacepa, fenofibrate      will d/w attending    Cardiology fellow

## 2022-07-16 NOTE — CONSULT NOTE ADULT - PROBLEM SELECTOR PROBLEM 1
Therapeutic Lifestyle Changes   LIFESTYLE CHANGE RECOMMENDATION APPROXIMATE REDUCTION IN SBP   Weight reduction Maintain a normal body weight                      (BMI 19-25) 5-20 mm Hg per 10 kg lost   Dash diet Consume a diet rich in fruits, vegetables, and low-fat dairy products with a reduced content of saturated fat and total fat 8-14 mg Hg   Low-sodium diet Consume <2400 mg of sodium per day 2-8 mg Hg   Increase physical activity Regular aerobic physical activity (i.e. brisk walking at least 40 minutes/session 3-4 days a week) 4-9 mm Hg   Limit alcohol consumption Less than 2 drinks/day in most men or less than 1 drink/day in women and lighter weight persons (1 drink = 12 oz. Beer, 5 oz. Wine, 1.5 oz. hard alcohol) 2-4 mm Hg   Smoking cessation Quit Smoking (Not reported) - known to reduce risk of developing cardiovascular disease.       HTN (hypertension)

## 2022-07-16 NOTE — PROGRESS NOTE ADULT - SUBJECTIVE AND OBJECTIVE BOX
O/N: PTT 32, hep from 14 to 16ml/hr. AM PTT 58. JARON, VSS  7/15: .9 hep dec 16->15, plan for OR, added on and consented, s/p diagnostic LLE angio w/tpa bolus                                 · Assessment	  77y.o M Hx HTN, HLD, DM, Pacemaker, Afib and PVD presents with LLE Critical limb ischemia.      -Admit to Vascular -> Dr Capone  -HepGtt w/ Bolus  -Restart Home MEds  -Hold Losartan and Eliquis  -Cards Clearance  -Obtain Echo from outside facility  -Upload CD of Angiogram     O/N: PTT 32, hep from 14 to 16ml/hr. AM PTT 58. JARON, VSS  7/15: .9 hep dec 16->15, plan for OR, added on and consented, s/p diagnostic LLE angio w/tpa bolus       SUBJECTIVE: Patient seen at bedside, says he still feels anxious after the procedure done yesterday. No CP, SOB, fevers or chills. LE pain unchanged from prior.     Vital Signs Last 24 Hrs  T(C): 36.6 (16 Jul 2022 05:59), Max: 36.7 (15 Jul 2022 19:00)  T(F): 97.8 (16 Jul 2022 05:59), Max: 98.1 (15 Jul 2022 19:00)  HR: 66 (16 Jul 2022 05:00) (60 - 94)  BP: 157/70 (16 Jul 2022 05:00) (142/110 - 207/971)  BP(mean): 100 (16 Jul 2022 05:00) (95 - 138)  RR: 17 (16 Jul 2022 05:00) (14 - 30)  SpO2: 93% (16 Jul 2022 05:00) (92% - 97%)    Parameters below as of 16 Jul 2022 05:00  Patient On (Oxygen Delivery Method): room air        Physical Exam:  Constitutional: Pt AXOX3 in NAD  Respiratory: Unlabored  Cardiovascular:S1S2  Extremities: LLE Mottled and coool  Vascular: RLE: mono DP/PT palp pop & Fem   LLE: no DP/PT, Biphasic Pop, palp fem  Neurological: Motor Sensory intact  Skin: Mottled    Lines/drains/tubes:    I&O's Summary    15 Jul 2022 07:01  -  16 Jul 2022 07:00  --------------------------------------------------------  IN: 560 mL / OUT: 700 mL / NET: -140 mL        LABS:                        13.4   7.63  )-----------( 154      ( 16 Jul 2022 05:30 )             38.7     07-16    135  |  95<L>  |  31<H>  ----------------------------<  141<H>  3.8   |  22  |  1.33<H>    Ca    9.5      16 Jul 2022 05:30  Phos  4.0     07-16  Mg     1.9     07-16    TPro  7.5  /  Alb  4.5  /  TBili  0.7  /  DBili  x   /  AST  119<H>  /  ALT  42  /  AlkPhos  61  07-15    PT/INR - ( 15 Jul 2022 02:43 )   PT: 13.3 sec;   INR: 1.12          PTT - ( 16 Jul 2022 05:30 )  PTT:58.5 sec    CAPILLARY BLOOD GLUCOSE      POCT Blood Glucose.: 128 mg/dL (15 Jul 2022 14:06)  POCT Blood Glucose.: 140 mg/dL (15 Jul 2022 09:31)    LIVER FUNCTIONS - ( 15 Jul 2022 02:43 )  Alb: 4.5 g/dL / Pro: 7.5 g/dL / ALK PHOS: 61 U/L / ALT: 42 U/L / AST: 119 U/L / GGT: x             RADIOLOGY & ADDITIONAL STUDIES:      · Assessment	  77y.o M Hx HTN, HLD, DM, Pacemaker, Afib and PVD presents with LLE Critical limb ischemia.    #Vascular  s/p diagnostic angiogram 7/15 unable to cross occluded popliteal stent  Continue heparin gtt  Plan for bypass Monday 7/18,   Vein mapping  Put outpatient ECHO in chart    #HTN  Continue home nifedipine    #HLD  Continue home statin + fenofibrate    #Bipolar Disorder  Continue home meds  O/N: PTT 32, hep from 14 to 16ml/hr. AM PTT 58. JARON, VSS  7/15: .9 hep dec 16->15, plan for OR, added on and consented, s/p diagnostic LLE angio w/tpa bolus       SUBJECTIVE: Patient seen at bedside, says he still feels anxious after the procedure done yesterday. No CP, SOB, fevers or chills. LE pain unchanged from prior.     Vital Signs Last 24 Hrs  T(C): 36.6 (16 Jul 2022 05:59), Max: 36.7 (15 Jul 2022 19:00)  T(F): 97.8 (16 Jul 2022 05:59), Max: 98.1 (15 Jul 2022 19:00)  HR: 66 (16 Jul 2022 05:00) (60 - 94)  BP: 157/70 (16 Jul 2022 05:00) (142/110 - 207/971)  BP(mean): 100 (16 Jul 2022 05:00) (95 - 138)  RR: 17 (16 Jul 2022 05:00) (14 - 30)  SpO2: 93% (16 Jul 2022 05:00) (92% - 97%)    Parameters below as of 16 Jul 2022 05:00  Patient On (Oxygen Delivery Method): room air        Physical Exam:  Constitutional: Pt AXOX3 in NAD  Respiratory: Unlabored  Cardiovascular:S1S2  Extremities: LLE Mottled and coool  Vascular: RLE: mono DP/PT palp pop & Fem   LLE: no DP/PT, Biphasic Pop, palp fem  Neurological: Motor Sensory intact  Skin: Mottled    Lines/drains/tubes:    I&O's Summary    15 Jul 2022 07:01  -  16 Jul 2022 07:00  --------------------------------------------------------  IN: 560 mL / OUT: 700 mL / NET: -140 mL        LABS:                        13.4   7.63  )-----------( 154      ( 16 Jul 2022 05:30 )             38.7     07-16    135  |  95<L>  |  31<H>  ----------------------------<  141<H>  3.8   |  22  |  1.33<H>    Ca    9.5      16 Jul 2022 05:30  Phos  4.0     07-16  Mg     1.9     07-16    TPro  7.5  /  Alb  4.5  /  TBili  0.7  /  DBili  x   /  AST  119<H>  /  ALT  42  /  AlkPhos  61  07-15    PT/INR - ( 15 Jul 2022 02:43 )   PT: 13.3 sec;   INR: 1.12          PTT - ( 16 Jul 2022 05:30 )  PTT:58.5 sec    CAPILLARY BLOOD GLUCOSE      POCT Blood Glucose.: 128 mg/dL (15 Jul 2022 14:06)  POCT Blood Glucose.: 140 mg/dL (15 Jul 2022 09:31)    LIVER FUNCTIONS - ( 15 Jul 2022 02:43 )  Alb: 4.5 g/dL / Pro: 7.5 g/dL / ALK PHOS: 61 U/L / ALT: 42 U/L / AST: 119 U/L / GGT: x             RADIOLOGY & ADDITIONAL STUDIES:      · Assessment	  77y.o M Hx HTN, HLD, DM, Pacemaker, Afib and PVD presents with LLE Critical limb ischemia.    #Vascular  s/p diagnostic angiogram 7/15 unable to cross occluded popliteal stent  Continue heparin gtt  Plan for bypass Monday 7/18,   Vein mapping  Put outpatient ECHO in chart    #HTN  Continue home nifedipine    #HLD  Continue home statin + fenofibrate    #Bipolar Disorder  Continue home meds     DVTppx: Heparin Gtt    Dispo: Pending vein mapping and OR Monday

## 2022-07-16 NOTE — CONSULT NOTE ADULT - PROBLEM SELECTOR RECOMMENDATION 4
he is on AC.  I discussed the case with Dr Livingston.  he is for surgery on Monday and possible graft but he might need amputation.  PPT is therapeutic.  No hematoma at the puncture site on the right

## 2022-07-17 ENCOUNTER — TRANSCRIPTION ENCOUNTER (OUTPATIENT)
Age: 77
End: 2022-07-17

## 2022-07-17 LAB
A1C WITH ESTIMATED AVERAGE GLUCOSE RESULT: 5.5 % — SIGNIFICANT CHANGE UP (ref 4–5.6)
ANION GAP SERPL CALC-SCNC: 12 MMOL/L — SIGNIFICANT CHANGE UP (ref 5–17)
APPEARANCE UR: ABNORMAL
APTT BLD: 64.7 SEC — HIGH (ref 27.5–35.5)
APTT BLD: 66.5 SEC — HIGH (ref 27.5–35.5)
BACTERIA # UR AUTO: SIGNIFICANT CHANGE UP /HPF
BILIRUB UR-MCNC: NEGATIVE — SIGNIFICANT CHANGE UP
BUN SERPL-MCNC: 27 MG/DL — HIGH (ref 7–23)
CALCIUM SERPL-MCNC: 9.2 MG/DL — SIGNIFICANT CHANGE UP (ref 8.4–10.5)
CHLORIDE SERPL-SCNC: 97 MMOL/L — SIGNIFICANT CHANGE UP (ref 96–108)
CO2 SERPL-SCNC: 24 MMOL/L — SIGNIFICANT CHANGE UP (ref 22–31)
COLOR SPEC: YELLOW — SIGNIFICANT CHANGE UP
CREAT SERPL-MCNC: 1.18 MG/DL — SIGNIFICANT CHANGE UP (ref 0.5–1.3)
DIFF PNL FLD: ABNORMAL
EGFR: 63 ML/MIN/1.73M2 — SIGNIFICANT CHANGE UP
EPI CELLS # UR: SIGNIFICANT CHANGE UP /HPF (ref 0–5)
ESTIMATED AVERAGE GLUCOSE: 111 MG/DL — SIGNIFICANT CHANGE UP (ref 68–114)
GLUCOSE BLDC GLUCOMTR-MCNC: 155 MG/DL — HIGH (ref 70–99)
GLUCOSE BLDC GLUCOMTR-MCNC: 169 MG/DL — HIGH (ref 70–99)
GLUCOSE BLDC GLUCOMTR-MCNC: 199 MG/DL — HIGH (ref 70–99)
GLUCOSE SERPL-MCNC: 166 MG/DL — HIGH (ref 70–99)
GLUCOSE UR QL: 500
HCT VFR BLD CALC: 36.1 % — LOW (ref 39–50)
HGB BLD-MCNC: 12.5 G/DL — LOW (ref 13–17)
KETONES UR-MCNC: NEGATIVE — SIGNIFICANT CHANGE UP
LEUKOCYTE ESTERASE UR-ACNC: NEGATIVE — SIGNIFICANT CHANGE UP
MAGNESIUM SERPL-MCNC: 1.8 MG/DL — SIGNIFICANT CHANGE UP (ref 1.6–2.6)
MCHC RBC-ENTMCNC: 32.2 PG — SIGNIFICANT CHANGE UP (ref 27–34)
MCHC RBC-ENTMCNC: 34.6 GM/DL — SIGNIFICANT CHANGE UP (ref 32–36)
MCV RBC AUTO: 93 FL — SIGNIFICANT CHANGE UP (ref 80–100)
NITRITE UR-MCNC: NEGATIVE — SIGNIFICANT CHANGE UP
NRBC # BLD: 0 /100 WBCS — SIGNIFICANT CHANGE UP (ref 0–0)
PH UR: 6 — SIGNIFICANT CHANGE UP (ref 5–8)
PHOSPHATE SERPL-MCNC: 2.6 MG/DL — SIGNIFICANT CHANGE UP (ref 2.5–4.5)
PLATELET # BLD AUTO: 140 K/UL — LOW (ref 150–400)
POTASSIUM SERPL-MCNC: 3.8 MMOL/L — SIGNIFICANT CHANGE UP (ref 3.5–5.3)
POTASSIUM SERPL-SCNC: 3.8 MMOL/L — SIGNIFICANT CHANGE UP (ref 3.5–5.3)
PROT UR-MCNC: NEGATIVE MG/DL — SIGNIFICANT CHANGE UP
RBC # BLD: 3.88 M/UL — LOW (ref 4.2–5.8)
RBC # FLD: 13.6 % — SIGNIFICANT CHANGE UP (ref 10.3–14.5)
RBC CASTS # UR COMP ASSIST: < 5 /HPF — SIGNIFICANT CHANGE UP
SARS-COV-2 RNA SPEC QL NAA+PROBE: SIGNIFICANT CHANGE UP
SODIUM SERPL-SCNC: 133 MMOL/L — LOW (ref 135–145)
SP GR SPEC: 1.01 — SIGNIFICANT CHANGE UP (ref 1–1.03)
UROBILINOGEN FLD QL: 4 E.U./DL
WBC # BLD: 7.4 K/UL — SIGNIFICANT CHANGE UP (ref 3.8–10.5)
WBC # FLD AUTO: 7.4 K/UL — SIGNIFICANT CHANGE UP (ref 3.8–10.5)
WBC UR QL: ABNORMAL /HPF

## 2022-07-17 PROCEDURE — 99233 SBSQ HOSP IP/OBS HIGH 50: CPT | Mod: GC

## 2022-07-17 PROCEDURE — 99233 SBSQ HOSP IP/OBS HIGH 50: CPT

## 2022-07-17 PROCEDURE — 99222 1ST HOSP IP/OBS MODERATE 55: CPT

## 2022-07-17 RX ORDER — DEXTROSE 50 % IN WATER 50 %
25 SYRINGE (ML) INTRAVENOUS ONCE
Refills: 0 | Status: DISCONTINUED | OUTPATIENT
Start: 2022-07-17 | End: 2022-07-18

## 2022-07-17 RX ORDER — DEXTROSE 50 % IN WATER 50 %
12.5 SYRINGE (ML) INTRAVENOUS ONCE
Refills: 0 | Status: DISCONTINUED | OUTPATIENT
Start: 2022-07-17 | End: 2022-07-18

## 2022-07-17 RX ORDER — GLUCAGON INJECTION, SOLUTION 0.5 MG/.1ML
1 INJECTION, SOLUTION SUBCUTANEOUS ONCE
Refills: 0 | Status: DISCONTINUED | OUTPATIENT
Start: 2022-07-17 | End: 2022-07-18

## 2022-07-17 RX ORDER — SODIUM CHLORIDE 9 MG/ML
1000 INJECTION INTRAMUSCULAR; INTRAVENOUS; SUBCUTANEOUS
Refills: 0 | Status: DISCONTINUED | OUTPATIENT
Start: 2022-07-17 | End: 2022-07-18

## 2022-07-17 RX ORDER — DEXTROSE 50 % IN WATER 50 %
15 SYRINGE (ML) INTRAVENOUS ONCE
Refills: 0 | Status: DISCONTINUED | OUTPATIENT
Start: 2022-07-17 | End: 2022-07-18

## 2022-07-17 RX ORDER — SODIUM CHLORIDE 9 MG/ML
1000 INJECTION, SOLUTION INTRAVENOUS
Refills: 0 | Status: DISCONTINUED | OUTPATIENT
Start: 2022-07-17 | End: 2022-07-18

## 2022-07-17 RX ORDER — LANOLIN ALCOHOL/MO/W.PET/CERES
5 CREAM (GRAM) TOPICAL AT BEDTIME
Refills: 0 | Status: DISCONTINUED | OUTPATIENT
Start: 2022-07-17 | End: 2022-07-18

## 2022-07-17 RX ORDER — INSULIN LISPRO 100/ML
VIAL (ML) SUBCUTANEOUS
Refills: 0 | Status: DISCONTINUED | OUTPATIENT
Start: 2022-07-17 | End: 2022-07-18

## 2022-07-17 RX ADMIN — PANTOPRAZOLE SODIUM 40 MILLIGRAM(S): 20 TABLET, DELAYED RELEASE ORAL at 06:39

## 2022-07-17 RX ADMIN — Medication 2: at 13:39

## 2022-07-17 RX ADMIN — Medication 145 MILLIGRAM(S): at 10:04

## 2022-07-17 RX ADMIN — METHOCARBAMOL 500 MILLIGRAM(S): 500 TABLET, FILM COATED ORAL at 21:52

## 2022-07-17 RX ADMIN — Medication 1 MILLIGRAM(S): at 21:37

## 2022-07-17 RX ADMIN — Medication 100 MILLIGRAM(S): at 17:33

## 2022-07-17 RX ADMIN — Medication 200 MILLIGRAM(S): at 17:51

## 2022-07-17 RX ADMIN — Medication 5 MILLIGRAM(S): at 01:18

## 2022-07-17 RX ADMIN — Medication 150 MILLIGRAM(S): at 21:39

## 2022-07-17 RX ADMIN — METHOCARBAMOL 500 MILLIGRAM(S): 500 TABLET, FILM COATED ORAL at 13:47

## 2022-07-17 RX ADMIN — METHOCARBAMOL 500 MILLIGRAM(S): 500 TABLET, FILM COATED ORAL at 05:43

## 2022-07-17 RX ADMIN — HEPARIN SODIUM 16 UNIT(S)/HR: 5000 INJECTION INTRAVENOUS; SUBCUTANEOUS at 23:20

## 2022-07-17 RX ADMIN — TAMSULOSIN HYDROCHLORIDE 0.4 MILLIGRAM(S): 0.4 CAPSULE ORAL at 21:37

## 2022-07-17 RX ADMIN — Medication 200 MILLIGRAM(S): at 05:42

## 2022-07-17 RX ADMIN — OXYCODONE AND ACETAMINOPHEN 2 TABLET(S): 5; 325 TABLET ORAL at 20:30

## 2022-07-17 RX ADMIN — Medication 2: at 17:33

## 2022-07-17 RX ADMIN — Medication 5 MILLIGRAM(S): at 21:39

## 2022-07-17 RX ADMIN — ATORVASTATIN CALCIUM 40 MILLIGRAM(S): 80 TABLET, FILM COATED ORAL at 21:40

## 2022-07-17 RX ADMIN — Medication 100 MILLIGRAM(S): at 10:04

## 2022-07-17 RX ADMIN — Medication 2: at 07:03

## 2022-07-17 RX ADMIN — OXYCODONE AND ACETAMINOPHEN 2 TABLET(S): 5; 325 TABLET ORAL at 00:31

## 2022-07-17 RX ADMIN — Medication 60 MILLIGRAM(S): at 05:43

## 2022-07-17 RX ADMIN — Medication 1 MILLIGRAM(S): at 02:41

## 2022-07-17 RX ADMIN — OXYCODONE AND ACETAMINOPHEN 2 TABLET(S): 5; 325 TABLET ORAL at 19:57

## 2022-07-17 RX ADMIN — Medication 81 MILLIGRAM(S): at 10:04

## 2022-07-17 NOTE — PROGRESS NOTE ADULT - SUBJECTIVE AND OBJECTIVE BOX
ON: MN PTT 66, started on ISS, patient disoriented/sundowning, needs constant reorienting, placed on 1:1                        ---------------------------------------------------------------------------  PLEASE CHECK WHEN PRESENT:     [  ] Heart Failure     [  ] Acute     [  ] Acute on Chronic     [  ] Chronic  -------------------------------------------------------------------     [ x ]Diastolic [HFpEF]     [  ]Systolic [HFrEF]     [  ]Combined [HFpEF & HFrEF]     [x  ] afib     [ x ] hypertensive heart disease     [  ]Other:  -------------------------------------------------------------------  [ ] Respiratory failure  [ ] Acute cor pulmonale  [ ] Asthma/COPD Exacerbation  [ ] Pleural effusion  [ ] Aspiration pneumonia  -------------------------------------------------------------------  [  ]TANIKA     [  ]ATN     [  ]Reneal Medullary Necrosis     [  ]Renal Cortical Necrosis     [  ]Other Pathological Lesions:    [  ]CKD 1  [  ]CKD 2  [  ]CKD 3  [  ]CKD 4  [  ]CKD 5  [  ]Other  -------------------------------------------------------------------  [x  ]Diabetes  [  ] Diabetic PVD Ulcer  [  ] Neuropathic ulcer to DM  [  ] Diabetes with Nephropathy  [  ] Osteomyelitis due to diabetes  --------------------------------------------------------------------  [  ]Malnutrition: See Nutrition Note  [  ]Cachexia  [  ]Other:   [  ]Supplement Ordered:  [  ]Morbid Obesity (BMI >=40]  ---------------------------------------------------------------------  [ ] Sepsis/severe sepsis/septic shock  [ ] UTI  [ ] Pneumonia  -----------------------------------------------------------------------  [ ] Acidosis/alkalosis  [ ] Fluid overload  [ ] Hypokalemia  [ ] Hyperkalemia  [ ] Hypomagnesemia  [ ] Hypophosphatemia  [ ] Hyperphosphatemia  ------------------------------------------------------------------------  [ ] Acute blood loss anemia  [ ] Post op blood loss anemia  [ ] Iron deficiency anemia  [ ] Anemia due to chronic disease  [ ] Hypercoagulable state  ----------------------------------------------------------------------  [ ] Cerebral infarction  [ ] Transient ischemia attack  [ ] Encephalopathy        77y.o M Hx HTN, HLD, DM, Pacemaker, Afib and PVD presents with LLE Critical limb ischemia.    #Vascular  s/p diagnostic angiogram 7/15 unable to cross occluded popliteal stent  Continue heparin gtt  Plan for bypass Monday 7/18,  preop and consent  Vein mapping done  Outpatient ECHO in chart      #Diastolic dysfunction/Pacemaker  -f/u cardiology recs  -Call EP regarding pacemaker settings for OR    #Afib  Continue heparin gtt    #DM  Continue moderate ISS    #HTN  Continue home nifedipine    #HLD  Continue home statin + fenofibrate    #Bipolar Disorder  Continue home meds     DVTppx: Heparin Gtt    Dispo: Plan for OR Monday ON: MN PTT 66, started on ISS, patient disoriented/sundowning, needs constant reorienting, placed on 1:1    Subjective: Patient seen and examined at bedside.     ROS:   Denies Headache, blurred vision, Chest Pain, SOB, Abdominal pain, nausea or vomiting     Social   aspirin enteric coated 81  heparin  Infusion 1400  NIFEdipine XL 60  propranolol 10  tamsulosin 0.4      Allergies    No Known Allergies    Intolerances        Vital Signs Last 24 Hrs  T(C): 36.6 (17 Jul 2022 04:51), Max: 36.7 (16 Jul 2022 22:41)  T(F): 97.9 (17 Jul 2022 04:51), Max: 98 (16 Jul 2022 22:41)  HR: 64 (17 Jul 2022 05:21) (60 - 74)  BP: 119/59 (17 Jul 2022 05:21) (119/59 - 160/73)  BP(mean): 85 (17 Jul 2022 05:21) (85 - 109)  RR: 16 (17 Jul 2022 05:21) (16 - 18)  SpO2: 97% (17 Jul 2022 00:16) (94% - 100%)    Parameters below as of 17 Jul 2022 05:21  Patient On (Oxygen Delivery Method): room air      I&O's Summary    16 Jul 2022 07:01  -  17 Jul 2022 07:00  --------------------------------------------------------  IN: 592 mL / OUT: 700 mL / NET: -108 mL    17 Jul 2022 07:01  -  17 Jul 2022 09:03  --------------------------------------------------------  IN: 16 mL / OUT: 225 mL / NET: -209 mL        Physical Exam:  Constitutional: A&OX3 in NAD  Respiratory: Unlabored  Cardiovascular: RRR  Extremities: LLE Mottled and cool to touch to below knee  Vascular: RLE: mono DP/PT palp pop & Fem   LLE: no DP/PT, Biphasic Pop, palp fem  Neurological: Motor Sensory decreased      LABS:                        12.5   7.40  )-----------( 140      ( 17 Jul 2022 05:30 )             36.1     07-17    133<L>  |  97  |  27<H>  ----------------------------<  166<H>  3.8   |  24  |  1.18    Ca    9.2      17 Jul 2022 05:30  Phos  2.6     07-17  Mg     1.8     07-17      PTT - ( 17 Jul 2022 05:30 )  PTT:64.7 sec    ---------------------------------------------------------------------------  PLEASE CHECK WHEN PRESENT:     [  ] Heart Failure     [  ] Acute     [  ] Acute on Chronic     [  ] Chronic  -------------------------------------------------------------------     [ x ]Diastolic [HFpEF]     [  ]Systolic [HFrEF]     [  ]Combined [HFpEF & HFrEF]     [x  ] afib     [ x ] hypertensive heart disease     [  ]Other:  -------------------------------------------------------------------  [ ] Respiratory failure  [ ] Acute cor pulmonale  [ ] Asthma/COPD Exacerbation  [ ] Pleural effusion  [ ] Aspiration pneumonia  -------------------------------------------------------------------  [  ]TANIKA     [  ]ATN     [  ]Reneal Medullary Necrosis     [  ]Renal Cortical Necrosis     [  ]Other Pathological Lesions:    [  ]CKD 1  [  ]CKD 2  [  ]CKD 3  [  ]CKD 4  [  ]CKD 5  [  ]Other  -------------------------------------------------------------------  [x  ]Diabetes  [  ] Diabetic PVD Ulcer  [  ] Neuropathic ulcer to DM  [  ] Diabetes with Nephropathy  [  ] Osteomyelitis due to diabetes  --------------------------------------------------------------------  [  ]Malnutrition: See Nutrition Note  [  ]Cachexia  [  ]Other:   [  ]Supplement Ordered:  [  ]Morbid Obesity (BMI >=40]  ---------------------------------------------------------------------  [ ] Sepsis/severe sepsis/septic shock  [ ] UTI  [ ] Pneumonia  -----------------------------------------------------------------------  [ ] Acidosis/alkalosis  [ ] Fluid overload  [ ] Hypokalemia  [ ] Hyperkalemia  [ ] Hypomagnesemia  [ ] Hypophosphatemia  [ ] Hyperphosphatemia  ------------------------------------------------------------------------  [ ] Acute blood loss anemia  [ ] Post op blood loss anemia  [ ] Iron deficiency anemia  [ ] Anemia due to chronic disease  [ ] Hypercoagulable state  ----------------------------------------------------------------------  [ ] Cerebral infarction  [ ] Transient ischemia attack  [ ] Encephalopathy        77y.o M Hx HTN, HLD, DM, Pacemaker, Afib and PVD presents with LLE Critical limb ischemia.    #Vascular  s/p diagnostic angiogram 7/15 unable to cross occluded popliteal stent  Continue heparin gtt  Plan for bypass Monday 7/18,  preop and consent  Vein mapping done  Outpatient ECHO in chart    #Diastolic dysfunction/Pacemaker  -f/u cardiology recs  -Call EP regarding pacemaker settings for OR    #Afib  Continue heparin gtt    #DM  Continue moderate ISS    #HTN  Continue home nifedipine    #HLD  Continue home statin + fenofibrate    #Bipolar Disorder  Continue home meds     DVTppx: Heparin Gtt    Dispo: Plan for OR Monday

## 2022-07-17 NOTE — PROGRESS NOTE ADULT - SUBJECTIVE AND OBJECTIVE BOX
Pre-op Diagnosis: LLE ischemia  Procedure: LLE bypass  Surgeon: Liborio    Consent: in chart                          12.5   7.40  )-----------( 140      ( 17 Jul 2022 05:30 )             36.1     07-17    133<L>  |  97  |  27<H>  ----------------------------<  166<H>  3.8   |  24  |  1.18    Ca    9.2      17 Jul 2022 05:30  Phos  2.6     07-17  Mg     1.8     07-17      PTT - ( 17 Jul 2022 05:30 )  PTT:64.7 sec      Type & Screen:     (07.15.22 @ 02:45)   ABO Interpretation: O   Rh Interpretation: Positive   Antibody Screen: Negative      (*With most recent within 72hrs of OR)  CXR: < from: Xray Chest 1 View-PORTABLE IMMEDIATE (Xray Chest 1 View-PORTABLE IMMEDIATE .) (07.15.22 @ 02:12) >  impression: Satisfactory position right ICD. Cardiomegaly, thoracic   aortic calcification, TAVR. Lungs and mediastinum are unremarkable.   Bilateral shoulder degenerative changes.    < end of copied text >    EKG: paced  UA:    COVID status/date: [ x]Negative/Date: 7/15 [ ]Positive/Date:     *With most recent within 48hrs of OR    Is patient on ACE/ARB? [ x]No [ ]Yes   *If yes, please hold any ACE/ARB the day of surgery    Is patient on Lantus at bedtime?  [x ]No [ ]Yes   *If yes, please half the dose the night before OR since patient will be NPO    Does patient have a contrast allergy? [x ]No [ ]Yes  *If yes, please pre-medicate per protocol    Is patient on anticoagulation? [ ]No [x ] Yes  *If yes, please discuss with team when to hold it    Is the patient Female and <56yo [ x]No [ ] Yes  If yes, pregnancy test must be documented in the chart    Is patient on dialysis? [x ]No [ ]Yes  *If yes, please obtain all labs including K level EARLY the day of surgery   *Also, will NOT require IVF past midnight    A/P: 77yMale pre-op for above procedure  1. NPO past midnight, except medications  2. IVF at midnight  3. [ x] Blood on hold, Units: 2  4. am labs  5. f/u covid pcr

## 2022-07-17 NOTE — PROGRESS NOTE ADULT - SUBJECTIVE AND OBJECTIVE BOX
Patient was seen and examined at bedside. Case discuss with resident. Pt had an episode of sundowning overnight. Pt was agitated, confused and did not sleep last night.     OBJECTIVE:  Vital Signs Last 24 Hrs  T(C): 36.6 (17 Jul 2022 04:51), Max: 36.7 (16 Jul 2022 22:41)  T(F): 97.9 (17 Jul 2022 04:51), Max: 98 (16 Jul 2022 22:41)  HR: 66 (17 Jul 2022 09:30) (60 - 74)  BP: 156/82 (17 Jul 2022 09:30) (119/59 - 160/73)  BP(mean): 85 (17 Jul 2022 05:21) (85 - 109)  RR: 16 (17 Jul 2022 09:30) (16 - 18)  SpO2: 95% (17 Jul 2022 09:30) (94% - 100%)    PHYSICAL EXAM:  Pt's wife who was at bedside did not want to wake the pt up as he was finally sleeping after his episode of sundowning overnight.     LABS:                        12.5   7.40  )-----------( 140      ( 17 Jul 2022 05:30 )             36.1     07-17    133<L>  |  97  |  27<H>  ----------------------------<  166<H>  3.8   |  24  |  1.18    Ca    9.2      17 Jul 2022 05:30  Phos  2.6     07-17  Mg     1.8     07-17        PTT - ( 17 Jul 2022 05:30 )  PTT:64.7 sec    CAPILLARY BLOOD GLUCOSE  POCT Blood Glucose.: 169 mg/dL (17 Jul 2022 06:44)      MEDICATIONS  allopurinol 100 milliGRAM(s) Oral daily  aspirin enteric coated 81 milliGRAM(s) Oral daily  atorvastatin 40 milliGRAM(s) Oral at bedtime  carBAMazepine 200 milliGRAM(s) Oral two times a day  clonazePAM  Tablet 1 milliGRAM(s) Oral every 6 hours PRN  fenofibrate Tablet 145 milliGRAM(s) Oral daily  glucagon  Injectable 1 milliGRAM(s) IntraMuscular once  heparin  Infusion 1400 Unit(s)/Hr IV Continuous <Continuous>  insulin lispro (ADMELOG) corrective regimen sliding scale   SubCutaneous three times a day before meals  melatonin 5 milliGRAM(s) Oral at bedtime  methocarbamol 500 milliGRAM(s) Oral three times a day  NIFEdipine XL 60 milliGRAM(s) Oral daily  oxycodone    5 mG/acetaminophen 325 mG 2 Tablet(s) Oral every 4 hours PRN  pantoprazole    Tablet 40 milliGRAM(s) Oral before breakfast  propranolol 10 milliGRAM(s) Oral three times a day  sodium chloride 0.9%. 1000 milliLiter(s) IV Continuous <Continuous>  tamsulosin 0.4 milliGRAM(s) Oral at bedtime  traZODone 150 milliGRAM(s) Oral at bedtime  venlafaxine 100 milliGRAM(s) Oral two times a day with meals      A/P: 77y.o M Hx HTN, HLD, DM, Pacemaker, Afib and PVD presents with LLE Critical limb ischemia s/p diagnostic angiogram 7/15.     # LLE Critical limb ischemia  -Pt is s/p diagnostic angiogram 7/15 unable to cross occluded popliteal stent  -Continue heparin gtt  -Pt is for bypass Monday 7/18; NPO at midnight   -Cardiology consult appreciated; Per cardiology the pt is elevated CV risk for this moderate risk procedure.  -Will hold Losartan as per cardiology recommendations      #HTN  #HLD  #Hx of Pacemaker placement  #Hx of Afib   -Continue nifedipine  -Continue home statin + fenofibrate  -Pt is on Heparin gtt; Will continue to hold apixaban    #Bipolar Disorder  #Sundowning  -Continue Bipolar Disorder   -Continue pt exposure to light and continue to orientate the pt      Dispo: As per vascular surgery team plan for vein mapping and OR on Monday 7/18

## 2022-07-17 NOTE — PROGRESS NOTE ADULT - SUBJECTIVE AND OBJECTIVE BOX
Interval Events: Reviewed  Patient seen and examined at bedside.    Patient is a 77y old  Male who presents with a chief complaint of Critical Limb Ischemia (17 Jul 2022 03:44)    he did not sleep much  PAST MEDICAL & SURGICAL HISTORY:  HTN (hypertension)      High cholesterol      DM (diabetes mellitus)      Popliteal aneurysm      Pacemaker      S/P TAVR (transcatheter aortic valve replacement)          MEDICATIONS:  Pulmonary:    Antimicrobials:    Anticoagulants:  aspirin enteric coated 81 milliGRAM(s) Oral daily  heparin  Infusion 1400 Unit(s)/Hr IV Continuous <Continuous>    Cardiac:  NIFEdipine XL 60 milliGRAM(s) Oral daily  propranolol 10 milliGRAM(s) Oral three times a day  tamsulosin 0.4 milliGRAM(s) Oral at bedtime      Allergies    No Known Allergies    Intolerances        Vital Signs Last 24 Hrs  T(C): 36.6 (17 Jul 2022 04:51), Max: 36.7 (16 Jul 2022 22:41)  T(F): 97.9 (17 Jul 2022 04:51), Max: 98 (16 Jul 2022 22:41)  HR: 66 (17 Jul 2022 09:30) (60 - 74)  BP: 156/82 (17 Jul 2022 09:30) (119/59 - 160/73)  BP(mean): 85 (17 Jul 2022 05:21) (85 - 109)  RR: 16 (17 Jul 2022 09:30) (16 - 18)  SpO2: 95% (17 Jul 2022 09:30) (94% - 100%)    Parameters below as of 17 Jul 2022 09:30  Patient On (Oxygen Delivery Method): room air        07-16 @ 07:01 - 07-17 @ 07:00  --------------------------------------------------------  IN: 592 mL / OUT: 700 mL / NET: -108 mL    07-17 @ 07:01 - 07-17 @ 12:24  --------------------------------------------------------  IN: 166 mL / OUT: 425 mL / NET: -259 mL          Review of Systems:   •	General: negative  •	Skin/Breast: negative  •	Ophthalmologic: negative  •	ENMT: negative  •	Respiratory and Thorax: negative  •	Cardiovascular: negative  •	Gastrointestinal: negative  •	Genitourinary: negative  •	Musculoskeletal: negative  •	Neurological: negative  •	Psychiatric: negative  •	Hematology/Lymphatics: negative  •	Endocrine: negative  •	Allergic/Immunologic: negative    Physical Exam:   • Constitutional:	refer to the dietion /Nutritionist note  • Eyes:	EOMI; PERRL; no drainage or redness  • ENMT:	No oral lesions; no gross abnormalities  • Neck	No bruits; no thyromegaly or nodules  • Breasts:	not examined  • Back:	No deformity or limitation of movement  • Respiratory:	Breath Sounds equal & clear to percussion & auscultation, no accessory muscle use  • Cardiovascular:	Regular rate & rhythm, normal S1, S2; no murmurs, gallops or rubs; no S3, S4  • Gastrointestinal:	Soft, non-tender, no hepatosplenomegaly, normal bowel sounds  • Genitourinary:	not examined  • Rectal: not examined  • Extremities:	No cyanosis, clubbing or edema  • Vascular:	Equal and normal pulses (carotid, femoral, dorsalis pedis)  • Neurologica:l	not examined  • Skin:	No lesions; no rash  • Lymph Nodes:	No lymphadedenopathy  • Musculoskeletal:	No joint pain, swelling or deformity; no limitation of movement        LABS:      CBC Full  -  ( 17 Jul 2022 05:30 )  WBC Count : 7.40 K/uL  RBC Count : 3.88 M/uL  Hemoglobin : 12.5 g/dL  Hematocrit : 36.1 %  Platelet Count - Automated : 140 K/uL  Mean Cell Volume : 93.0 fl  Mean Cell Hemoglobin : 32.2 pg  Mean Cell Hemoglobin Concentration : 34.6 gm/dL  Auto Neutrophil # : x  Auto Lymphocyte # : x  Auto Monocyte # : x  Auto Eosinophil # : x  Auto Basophil # : x  Auto Neutrophil % : x  Auto Lymphocyte % : x  Auto Monocyte % : x  Auto Eosinophil % : x  Auto Basophil % : x    07-17    133<L>  |  97  |  27<H>  ----------------------------<  166<H>  3.8   |  24  |  1.18    Ca    9.2      17 Jul 2022 05:30  Phos  2.6     07-17  Mg     1.8     07-17      PTT - ( 17 Jul 2022 05:30 )  PTT:64.7 sec                    RADIOLOGY & ADDITIONAL STUDIES (The following images were personally reviewed):

## 2022-07-18 ENCOUNTER — TRANSCRIPTION ENCOUNTER (OUTPATIENT)
Age: 77
End: 2022-07-18

## 2022-07-18 ENCOUNTER — RESULT REVIEW (OUTPATIENT)
Age: 77
End: 2022-07-18

## 2022-07-18 LAB
ANION GAP SERPL CALC-SCNC: 11 MMOL/L — SIGNIFICANT CHANGE UP (ref 5–17)
ANION GAP SERPL CALC-SCNC: 9 MMOL/L — SIGNIFICANT CHANGE UP (ref 5–17)
APTT BLD: 108.3 SEC — HIGH (ref 27.5–35.5)
APTT BLD: 27.2 SEC — LOW (ref 27.5–35.5)
APTT BLD: 62.5 SEC — HIGH (ref 27.5–35.5)
BASE EXCESS BLDA CALC-SCNC: 2.1 MMOL/L — SIGNIFICANT CHANGE UP (ref -2–3)
BASE EXCESS BLDA CALC-SCNC: 2.2 MMOL/L — SIGNIFICANT CHANGE UP (ref -2–3)
BLD GP AB SCN SERPL QL: NEGATIVE — SIGNIFICANT CHANGE UP
BUN SERPL-MCNC: 20 MG/DL — SIGNIFICANT CHANGE UP (ref 7–23)
BUN SERPL-MCNC: 23 MG/DL — SIGNIFICANT CHANGE UP (ref 7–23)
CA-I BLDA-SCNC: 1.2 MMOL/L — SIGNIFICANT CHANGE UP (ref 1.15–1.33)
CALCIUM SERPL-MCNC: 9 MG/DL — SIGNIFICANT CHANGE UP (ref 8.4–10.5)
CALCIUM SERPL-MCNC: 9.8 MG/DL — SIGNIFICANT CHANGE UP (ref 8.4–10.5)
CHLORIDE SERPL-SCNC: 96 MMOL/L — SIGNIFICANT CHANGE UP (ref 96–108)
CHLORIDE SERPL-SCNC: 99 MMOL/L — SIGNIFICANT CHANGE UP (ref 96–108)
CK SERPL-CCNC: CRITICAL HIGH U/L (ref 30–200)
CO2 BLDA-SCNC: 26 MMOL/L — HIGH (ref 19–24)
CO2 BLDA-SCNC: 29 MMOL/L — HIGH (ref 19–24)
CO2 SERPL-SCNC: 25 MMOL/L — SIGNIFICANT CHANGE UP (ref 22–31)
CO2 SERPL-SCNC: 28 MMOL/L — SIGNIFICANT CHANGE UP (ref 22–31)
COHGB MFR BLDA: 0.4 % — SIGNIFICANT CHANGE UP
CREAT SERPL-MCNC: 1.03 MG/DL — SIGNIFICANT CHANGE UP (ref 0.5–1.3)
CREAT SERPL-MCNC: 1.03 MG/DL — SIGNIFICANT CHANGE UP (ref 0.5–1.3)
EGFR: 74 ML/MIN/1.73M2 — SIGNIFICANT CHANGE UP
EGFR: 74 ML/MIN/1.73M2 — SIGNIFICANT CHANGE UP
GLUCOSE BLDA-MCNC: 120 MG/DL — HIGH (ref 70–99)
GLUCOSE BLDC GLUCOMTR-MCNC: 177 MG/DL — HIGH (ref 70–99)
GLUCOSE SERPL-MCNC: 150 MG/DL — HIGH (ref 70–99)
GLUCOSE SERPL-MCNC: 164 MG/DL — HIGH (ref 70–99)
HCO3 BLDA-SCNC: 25 MMOL/L — SIGNIFICANT CHANGE UP (ref 21–28)
HCO3 BLDA-SCNC: 28 MMOL/L — SIGNIFICANT CHANGE UP (ref 21–28)
HCT VFR BLD CALC: 33.2 % — LOW (ref 39–50)
HCT VFR BLD CALC: 37.7 % — LOW (ref 39–50)
HGB BLD-MCNC: 11.5 G/DL — LOW (ref 13–17)
HGB BLD-MCNC: 13 G/DL — SIGNIFICANT CHANGE UP (ref 13–17)
HGB BLDA-MCNC: 12.7 G/DL — SIGNIFICANT CHANGE UP (ref 12.6–17.4)
ISTAT ACTK (ACTIVATED CLOTTING TIME KAOLIN): 213 SEC — HIGH (ref 74–137)
ISTAT ACTK (ACTIVATED CLOTTING TIME KAOLIN): 260 SEC — HIGH (ref 74–137)
ISTAT ACTK (ACTIVATED CLOTTING TIME KAOLIN): 289 SEC — HIGH (ref 74–137)
MAGNESIUM SERPL-MCNC: 1.7 MG/DL — SIGNIFICANT CHANGE UP (ref 1.6–2.6)
MAGNESIUM SERPL-MCNC: 1.9 MG/DL — SIGNIFICANT CHANGE UP (ref 1.6–2.6)
MCHC RBC-ENTMCNC: 31.3 PG — SIGNIFICANT CHANGE UP (ref 27–34)
MCHC RBC-ENTMCNC: 31.9 PG — SIGNIFICANT CHANGE UP (ref 27–34)
MCHC RBC-ENTMCNC: 34.5 GM/DL — SIGNIFICANT CHANGE UP (ref 32–36)
MCHC RBC-ENTMCNC: 34.6 GM/DL — SIGNIFICANT CHANGE UP (ref 32–36)
MCV RBC AUTO: 90.8 FL — SIGNIFICANT CHANGE UP (ref 80–100)
MCV RBC AUTO: 92 FL — SIGNIFICANT CHANGE UP (ref 80–100)
METHGB MFR BLDA: 0.7 % — SIGNIFICANT CHANGE UP
NRBC # BLD: 0 /100 WBCS — SIGNIFICANT CHANGE UP (ref 0–0)
NRBC # BLD: 0 /100 WBCS — SIGNIFICANT CHANGE UP (ref 0–0)
OXYHGB MFR BLDA: 97.1 % — HIGH (ref 90–95)
PCO2 BLDA: 34 MMHG — LOW (ref 35–48)
PCO2 BLDA: 46 MMHG — SIGNIFICANT CHANGE UP (ref 35–48)
PH BLDA: 7.39 — SIGNIFICANT CHANGE UP (ref 7.35–7.45)
PH BLDA: 7.48 — HIGH (ref 7.35–7.45)
PHOSPHATE SERPL-MCNC: 2.5 MG/DL — SIGNIFICANT CHANGE UP (ref 2.5–4.5)
PHOSPHATE SERPL-MCNC: 3.3 MG/DL — SIGNIFICANT CHANGE UP (ref 2.5–4.5)
PLATELET # BLD AUTO: 172 K/UL — SIGNIFICANT CHANGE UP (ref 150–400)
PLATELET # BLD AUTO: 177 K/UL — SIGNIFICANT CHANGE UP (ref 150–400)
PO2 BLDA: 120 MMHG — HIGH (ref 83–108)
PO2 BLDA: 448 MMHG — HIGH (ref 83–108)
POTASSIUM BLDA-SCNC: 4.1 MMOL/L — SIGNIFICANT CHANGE UP (ref 3.5–5.1)
POTASSIUM SERPL-MCNC: 4.1 MMOL/L — SIGNIFICANT CHANGE UP (ref 3.5–5.3)
POTASSIUM SERPL-MCNC: 4.8 MMOL/L — SIGNIFICANT CHANGE UP (ref 3.5–5.3)
POTASSIUM SERPL-SCNC: 4.1 MMOL/L — SIGNIFICANT CHANGE UP (ref 3.5–5.3)
POTASSIUM SERPL-SCNC: 4.8 MMOL/L — SIGNIFICANT CHANGE UP (ref 3.5–5.3)
RBC # BLD: 3.61 M/UL — LOW (ref 4.2–5.8)
RBC # BLD: 4.15 M/UL — LOW (ref 4.2–5.8)
RBC # FLD: 13.3 % — SIGNIFICANT CHANGE UP (ref 10.3–14.5)
RBC # FLD: 13.6 % — SIGNIFICANT CHANGE UP (ref 10.3–14.5)
RH IG SCN BLD-IMP: POSITIVE — SIGNIFICANT CHANGE UP
SAO2 % BLDA: 98.2 % — HIGH (ref 94–98)
SAO2 % BLDA: 98.7 % — HIGH (ref 94–98)
SODIUM BLDA-SCNC: 130 MMOL/L — LOW (ref 136–145)
SODIUM SERPL-SCNC: 133 MMOL/L — LOW (ref 135–145)
SODIUM SERPL-SCNC: 135 MMOL/L — SIGNIFICANT CHANGE UP (ref 135–145)
WBC # BLD: 10.19 K/UL — SIGNIFICANT CHANGE UP (ref 3.8–10.5)
WBC # BLD: 7.81 K/UL — SIGNIFICANT CHANGE UP (ref 3.8–10.5)
WBC # FLD AUTO: 10.19 K/UL — SIGNIFICANT CHANGE UP (ref 3.8–10.5)
WBC # FLD AUTO: 7.81 K/UL — SIGNIFICANT CHANGE UP (ref 3.8–10.5)

## 2022-07-18 PROCEDURE — 88304 TISSUE EXAM BY PATHOLOGIST: CPT | Mod: 26

## 2022-07-18 PROCEDURE — 35566 ART BYP FEM-ANT-POST TIB/PRL: CPT | Mod: GC

## 2022-07-18 PROCEDURE — 36620 INSERTION CATHETER ARTERY: CPT | Mod: GC

## 2022-07-18 PROCEDURE — 34203 REMOVAL OF LEG ARTERY CLOT: CPT | Mod: GC

## 2022-07-18 PROCEDURE — 71045 X-RAY EXAM CHEST 1 VIEW: CPT | Mod: 26

## 2022-07-18 PROCEDURE — 99232 SBSQ HOSP IP/OBS MODERATE 35: CPT | Mod: GC

## 2022-07-18 DEVICE — LIGATING CLIPS WECK HORIZON MEDIUM (BLUE) 24: Type: IMPLANTABLE DEVICE | Site: LEFT | Status: FUNCTIONAL

## 2022-07-18 DEVICE — INTRO MICROPUNC 5FRX10CM SS: Type: IMPLANTABLE DEVICE | Site: LEFT | Status: FUNCTIONAL

## 2022-07-18 DEVICE — LIGATING CLIPS WECK HORIZON SMALL-WIDE (RED) 24: Type: IMPLANTABLE DEVICE | Site: LEFT | Status: FUNCTIONAL

## 2022-07-18 RX ORDER — DEXTROSE 50 % IN WATER 50 %
25 SYRINGE (ML) INTRAVENOUS ONCE
Refills: 0 | Status: DISCONTINUED | OUTPATIENT
Start: 2022-07-18 | End: 2022-07-25

## 2022-07-18 RX ORDER — ALLOPURINOL 300 MG
100 TABLET ORAL DAILY
Refills: 0 | Status: DISCONTINUED | OUTPATIENT
Start: 2022-07-18 | End: 2022-07-18

## 2022-07-18 RX ORDER — SENNA PLUS 8.6 MG/1
2 TABLET ORAL AT BEDTIME
Refills: 0 | Status: DISCONTINUED | OUTPATIENT
Start: 2022-07-18 | End: 2022-07-18

## 2022-07-18 RX ORDER — TRAZODONE HCL 50 MG
150 TABLET ORAL AT BEDTIME
Refills: 0 | Status: DISCONTINUED | OUTPATIENT
Start: 2022-07-18 | End: 2022-07-18

## 2022-07-18 RX ORDER — LABETALOL HCL 100 MG
10 TABLET ORAL ONCE
Refills: 0 | Status: COMPLETED | OUTPATIENT
Start: 2022-07-18 | End: 2022-07-18

## 2022-07-18 RX ORDER — LABETALOL HCL 100 MG
20 TABLET ORAL ONCE
Refills: 0 | Status: COMPLETED | OUTPATIENT
Start: 2022-07-18 | End: 2022-07-18

## 2022-07-18 RX ORDER — CEFAZOLIN SODIUM 1 G
2000 VIAL (EA) INJECTION EVERY 8 HOURS
Refills: 0 | Status: COMPLETED | OUTPATIENT
Start: 2022-07-18 | End: 2022-07-19

## 2022-07-18 RX ORDER — NIFEDIPINE 30 MG
60 TABLET, EXTENDED RELEASE 24 HR ORAL DAILY
Refills: 0 | Status: DISCONTINUED | OUTPATIENT
Start: 2022-07-18 | End: 2022-07-18

## 2022-07-18 RX ORDER — POLYETHYLENE GLYCOL 3350 17 G/17G
17 POWDER, FOR SOLUTION ORAL DAILY
Refills: 0 | Status: DISCONTINUED | OUTPATIENT
Start: 2022-07-18 | End: 2022-07-18

## 2022-07-18 RX ORDER — GLUCAGON INJECTION, SOLUTION 0.5 MG/.1ML
1 INJECTION, SOLUTION SUBCUTANEOUS ONCE
Refills: 0 | Status: DISCONTINUED | OUTPATIENT
Start: 2022-07-18 | End: 2022-07-25

## 2022-07-18 RX ORDER — VENLAFAXINE HCL 75 MG
100 CAPSULE, EXT RELEASE 24 HR ORAL
Refills: 0 | Status: DISCONTINUED | OUTPATIENT
Start: 2022-07-18 | End: 2022-07-19

## 2022-07-18 RX ORDER — HYDROMORPHONE HYDROCHLORIDE 2 MG/ML
0.25 INJECTION INTRAMUSCULAR; INTRAVENOUS; SUBCUTANEOUS ONCE
Refills: 0 | Status: DISCONTINUED | OUTPATIENT
Start: 2022-07-18 | End: 2022-07-18

## 2022-07-18 RX ORDER — DEXTROSE 50 % IN WATER 50 %
12.5 SYRINGE (ML) INTRAVENOUS ONCE
Refills: 0 | Status: DISCONTINUED | OUTPATIENT
Start: 2022-07-18 | End: 2022-07-25

## 2022-07-18 RX ORDER — MAGNESIUM SULFATE 500 MG/ML
1 VIAL (ML) INJECTION ONCE
Refills: 0 | Status: DISCONTINUED | OUTPATIENT
Start: 2022-07-18 | End: 2022-07-18

## 2022-07-18 RX ORDER — ATORVASTATIN CALCIUM 80 MG/1
40 TABLET, FILM COATED ORAL AT BEDTIME
Refills: 0 | Status: DISCONTINUED | OUTPATIENT
Start: 2022-07-18 | End: 2022-07-25

## 2022-07-18 RX ORDER — PANTOPRAZOLE SODIUM 20 MG/1
40 TABLET, DELAYED RELEASE ORAL
Refills: 0 | Status: DISCONTINUED | OUTPATIENT
Start: 2022-07-18 | End: 2022-07-20

## 2022-07-18 RX ORDER — LANOLIN ALCOHOL/MO/W.PET/CERES
5 CREAM (GRAM) TOPICAL AT BEDTIME
Refills: 0 | Status: DISCONTINUED | OUTPATIENT
Start: 2022-07-18 | End: 2022-07-18

## 2022-07-18 RX ORDER — DEXTROSE 50 % IN WATER 50 %
15 SYRINGE (ML) INTRAVENOUS ONCE
Refills: 0 | Status: DISCONTINUED | OUTPATIENT
Start: 2022-07-18 | End: 2022-07-25

## 2022-07-18 RX ORDER — TAMSULOSIN HYDROCHLORIDE 0.4 MG/1
0.4 CAPSULE ORAL AT BEDTIME
Refills: 0 | Status: DISCONTINUED | OUTPATIENT
Start: 2022-07-18 | End: 2022-07-20

## 2022-07-18 RX ORDER — ASPIRIN/CALCIUM CARB/MAGNESIUM 324 MG
81 TABLET ORAL DAILY
Refills: 0 | Status: DISCONTINUED | OUTPATIENT
Start: 2022-07-18 | End: 2022-07-18

## 2022-07-18 RX ORDER — METOPROLOL TARTRATE 50 MG
5 TABLET ORAL EVERY 6 HOURS
Refills: 0 | Status: DISCONTINUED | OUTPATIENT
Start: 2022-07-18 | End: 2022-07-23

## 2022-07-18 RX ORDER — INSULIN LISPRO 100/ML
VIAL (ML) SUBCUTANEOUS
Refills: 0 | Status: DISCONTINUED | OUTPATIENT
Start: 2022-07-18 | End: 2022-07-18

## 2022-07-18 RX ORDER — SODIUM CHLORIDE 9 MG/ML
1000 INJECTION, SOLUTION INTRAVENOUS
Refills: 0 | Status: DISCONTINUED | OUTPATIENT
Start: 2022-07-18 | End: 2022-07-25

## 2022-07-18 RX ORDER — METHOCARBAMOL 500 MG/1
500 TABLET, FILM COATED ORAL THREE TIMES A DAY
Refills: 0 | Status: DISCONTINUED | OUTPATIENT
Start: 2022-07-18 | End: 2022-07-18

## 2022-07-18 RX ORDER — CLONAZEPAM 1 MG
1 TABLET ORAL EVERY 6 HOURS
Refills: 0 | Status: DISCONTINUED | OUTPATIENT
Start: 2022-07-18 | End: 2022-07-18

## 2022-07-18 RX ORDER — SODIUM CHLORIDE 9 MG/ML
1000 INJECTION INTRAMUSCULAR; INTRAVENOUS; SUBCUTANEOUS
Refills: 0 | Status: DISCONTINUED | OUTPATIENT
Start: 2022-07-18 | End: 2022-07-20

## 2022-07-18 RX ORDER — FENOFIBRATE,MICRONIZED 130 MG
145 CAPSULE ORAL DAILY
Refills: 0 | Status: DISCONTINUED | OUTPATIENT
Start: 2022-07-18 | End: 2022-07-19

## 2022-07-18 RX ORDER — INSULIN LISPRO 100/ML
VIAL (ML) SUBCUTANEOUS
Refills: 0 | Status: DISCONTINUED | OUTPATIENT
Start: 2022-07-18 | End: 2022-07-25

## 2022-07-18 RX ORDER — ASCORBIC ACID 60 MG
500 TABLET,CHEWABLE ORAL DAILY
Refills: 0 | Status: DISCONTINUED | OUTPATIENT
Start: 2022-07-18 | End: 2022-07-25

## 2022-07-18 RX ORDER — ACETAMINOPHEN 500 MG
1000 TABLET ORAL ONCE
Refills: 0 | Status: COMPLETED | OUTPATIENT
Start: 2022-07-18 | End: 2022-07-18

## 2022-07-18 RX ORDER — CARBAMAZEPINE 200 MG
200 TABLET ORAL
Refills: 0 | Status: DISCONTINUED | OUTPATIENT
Start: 2022-07-18 | End: 2022-07-25

## 2022-07-18 RX ORDER — CHLORHEXIDINE GLUCONATE 213 G/1000ML
1 SOLUTION TOPICAL
Refills: 0 | Status: DISCONTINUED | OUTPATIENT
Start: 2022-07-18 | End: 2022-07-25

## 2022-07-18 RX ORDER — ASCORBIC ACID 60 MG
500 TABLET,CHEWABLE ORAL DAILY
Refills: 0 | Status: DISCONTINUED | OUTPATIENT
Start: 2022-07-18 | End: 2022-07-18

## 2022-07-18 RX ORDER — OXYCODONE AND ACETAMINOPHEN 5; 325 MG/1; MG/1
2 TABLET ORAL EVERY 4 HOURS
Refills: 0 | Status: DISCONTINUED | OUTPATIENT
Start: 2022-07-18 | End: 2022-07-18

## 2022-07-18 RX ADMIN — Medication 200 MILLIGRAM(S): at 07:09

## 2022-07-18 RX ADMIN — METHOCARBAMOL 500 MILLIGRAM(S): 500 TABLET, FILM COATED ORAL at 07:09

## 2022-07-18 RX ADMIN — Medication 1000 MILLIGRAM(S): at 22:00

## 2022-07-18 RX ADMIN — Medication 100 MILLIGRAM(S): at 22:44

## 2022-07-18 RX ADMIN — PANTOPRAZOLE SODIUM 40 MILLIGRAM(S): 20 TABLET, DELAYED RELEASE ORAL at 07:09

## 2022-07-18 RX ADMIN — Medication 5 MILLIGRAM(S): at 23:02

## 2022-07-18 RX ADMIN — HYDROMORPHONE HYDROCHLORIDE 0.25 MILLIGRAM(S): 2 INJECTION INTRAMUSCULAR; INTRAVENOUS; SUBCUTANEOUS at 23:41

## 2022-07-18 RX ADMIN — Medication 60 MILLIGRAM(S): at 07:09

## 2022-07-18 RX ADMIN — Medication 20 MILLIGRAM(S): at 21:41

## 2022-07-18 RX ADMIN — Medication 10 MILLIGRAM(S): at 20:25

## 2022-07-18 RX ADMIN — OXYCODONE AND ACETAMINOPHEN 2 TABLET(S): 5; 325 TABLET ORAL at 02:35

## 2022-07-18 RX ADMIN — Medication 400 MILLIGRAM(S): at 20:50

## 2022-07-18 RX ADMIN — OXYCODONE AND ACETAMINOPHEN 2 TABLET(S): 5; 325 TABLET ORAL at 03:00

## 2022-07-18 RX ADMIN — Medication 2: at 19:23

## 2022-07-18 NOTE — DIETITIAN INITIAL EVALUATION ADULT - OTHER CALCULATIONS
5'10''  pounds +-10%  Wt 187 pounds, BMI 26.9, %[id number]  current body wt used for energy calculations as pt falls within % IBW  adjust for age, Skin, Pending OR; fluids per team based on low sodium

## 2022-07-18 NOTE — PROGRESS NOTE ADULT - SUBJECTIVE AND OBJECTIVE BOX
Interval Events: Reviewed  Patient seen and examined at bedside.    Patient is a 77y old  Male who presents with a chief complaint of POOR ARTERIAL PERFUSIONOF LEG     (2022 11:08)  he is waiting for procedure    PAST MEDICAL & SURGICAL HISTORY:  HTN (hypertension)      High cholesterol      DM (diabetes mellitus)      Popliteal aneurysm      Pacemaker      S/P TAVR (transcatheter aortic valve replacement)          MEDICATIONS:  Pulmonary:    Antimicrobials:    Anticoagulants:  aspirin enteric coated 81 milliGRAM(s) Oral daily  heparin  Infusion 1400 Unit(s)/Hr IV Continuous <Continuous>    Cardiac:  NIFEdipine XL 60 milliGRAM(s) Oral daily  propranolol 10 milliGRAM(s) Oral three times a day  tamsulosin 0.4 milliGRAM(s) Oral at bedtime      Allergies    No Known Allergies    Intolerances        Vital Signs Last 24 Hrs  T(C): 36.1 (2022 10:42), Max: 37 (2022 21:52)  T(F): 97 (2022 10:42), Max: 98.6 (2022 21:52)  HR: 68 (2022 09:00) (62 - 70)  BP: 181/89 (2022 09:00) (165/79 - 182/91)  BP(mean): 114 (2022 00:26) (114 - 114)  RR: 19 (2022 09:00) (16 - 20)  SpO2: 94% (2022 09:00) (94% - 95%)    Parameters below as of 2022 09:00  Patient On (Oxygen Delivery Method): room air         @ : @ 07:00  --------------------------------------------------------  IN: 342 mL / OUT: 1500 mL / NET: -1158 mL     @ : @ 11:48  --------------------------------------------------------  IN: 32 mL / OUT: 0 mL / NET: 32 mL          Review of Systems:   •	General: negative  •	Skin/Breast: negative  •	Ophthalmologic: negative  •	ENMT: negative  •	Respiratory and Thorax: negative  •	Cardiovascular: negative  •	Gastrointestinal: negative  •	Genitourinary: negative  •	Musculoskeletal: negative  •	Neurological: negative  •	Psychiatric: negative  •	Hematology/Lymphatics: negative  •	Endocrine: negative  •	Allergic/Immunologic: negative    Physical Exam:   • Constitutional:	refer to the dietion /Nutritionist note  • Eyes:	EOMI; PERRL; no drainage or redness  • ENMT:	No oral lesions; no gross abnormalities  • Neck	No bruits; no thyromegaly or nodules  • Breasts:	not examined  • Back:	No deformity or limitation of movement  • Respiratory:	Breath Sounds equal & clear to percussion & auscultation, no accessory muscle use  • Cardiovascular:	Regular rate & rhythm, normal S1, S2; no murmurs, gallops or rubs; no S3, S4  • Gastrointestinal:	Soft, non-tender, no hepatosplenomegaly, normal bowel sounds  • Genitourinary:	not examined  • Rectal: not examined  • Extremities:	No cyanosis, clubbing or edema  • Vascular:	Equal and normal pulses (carotid, femoral, dorsalis pedis)  • Neurologica:l	not examined  • Skin:	No lesions; no rash  • Lymph Nodes:	No lymphadedenopathy  • Musculoskeletal:	No joint pain, swelling or deformity; no limitation of movement        LABS:      CBC Full  -  ( 2022 05:30 )  WBC Count : 7.81 K/uL  RBC Count : 4.15 M/uL  Hemoglobin : 13.0 g/dL  Hematocrit : 37.7 %  Platelet Count - Automated : 172 K/uL  Mean Cell Volume : 90.8 fl  Mean Cell Hemoglobin : 31.3 pg  Mean Cell Hemoglobin Concentration : 34.5 gm/dL  Auto Neutrophil # : x  Auto Lymphocyte # : x  Auto Monocyte # : x  Auto Eosinophil # : x  Auto Basophil # : x  Auto Neutrophil % : x  Auto Lymphocyte % : x  Auto Monocyte % : x  Auto Eosinophil % : x  Auto Basophil % : x    07-18    133<L>  |  96  |  23  ----------------------------<  150<H>  4.1   |  28  |  1.03    Ca    9.8      2022 05:30  Phos  2.5     07-18  Mg     1.9     07-18      PTT - ( 2022 05:30 )  PTT:62.5 sec      Urinalysis Basic - ( 2022 15:09 )    Color: Yellow / Appearance: Hazy / S.015 / pH: x  Gluc: x / Ketone: NEGATIVE  / Bili: Negative / Urobili: 4.0 E.U./dL   Blood: x / Protein: NEGATIVE mg/dL / Nitrite: NEGATIVE   Leuk Esterase: NEGATIVE / RBC: < 5 /HPF / WBC 5-10 /HPF   Sq Epi: x / Non Sq Epi: 0-5 /HPF / Bacteria: None /HPF                  RADIOLOGY & ADDITIONAL STUDIES (The following images were personally reviewed):

## 2022-07-18 NOTE — CONSULT NOTE ADULT - ATTENDING COMMENTS
77y.o M Hx HTN, HLD, DM, Pacemaker, Afib and PVD presents with LLE Critical limb ischemia. Now s/p SFA to TP trunk rGSV bypass (7/18) admitted to SICU for hemodynamic and respiratory monitoring.     plan as above

## 2022-07-18 NOTE — BRIEF OPERATIVE NOTE - NSICDXBRIEFPROCEDURE_GEN_ALL_CORE_FT
PROCEDURES:  Insertion, catheter, dialysis circuit, with diagnostic angiogram 15-Jul-2022 14:05:29  Rojelio Hackett  
PROCEDURES:  Creation of femoropopliteal arterial bypass in left lower extremity using vein graft 18-Jul-2022 17:44:04  Melony Phillips

## 2022-07-18 NOTE — DIETITIAN INITIAL EVALUATION ADULT - OTHER INFO
77M hx HTN, HLD, DM, Pacemaker, PVD, TAVR, c/o left leg pain x2 days. 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 Pt went to Urgent Care after pain started 2 days prior, he was then referred to the ED for further mgmt once it was established there was no flow to affected limb along with leg being mottled and cool. S/p Insertion, catheter, dialysis circuit, with diagnostic angiogram 7/15. Plan for bypass today 7/18.    Pt seen this AM on 5UR, family at bedside. Pt confused and family with little info to offer. NPO at this time. Kosher diet PTA, pt reports also keto however further details not provided - ? accuracy. Good PO PTA, also good PO prior to NPO. Not taking oral nutrition supplements PTA. NKFA. No issues chewing/swallowing. C/o constipation, last BM per flow sheets +BM 7/15. No pain. Aiden 16. No edema.   Labs: sodium 133, Glucose 150, POCT 199 155 169, A1c 5.5% (of note low HH), BUN Cr WDL.   Please see below for nutritions recommendations. Paged team.  77M hx HTN, HLD, DM, Pacemaker, PVD, TAVR, c/o left leg pain x2 days. 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 Pt went to Urgent Care after pain started 2 days prior, he was then referred to the ED for further mgmt once it was established there was no flow to affected limb along with leg being mottled and cool. S/p Insertion, catheter, dialysis circuit, with diagnostic angiogram 7/15. Plan for bypass today 7/18.    Pt seen this AM on 5UR, family at bedside. Pt confused and family with little info to offer. NPO at this time. Kosher diet PTA, pt reports also keto however further details not provided - ? accuracy. Good PO PTA, also good PO prior to NPO. Not taking oral nutrition supplements PTA. NKFA. No issues chewing/swallowing. C/o constipation, last BM per flow sheets +BM 7/15. No pain. Aiden 16. No edema. L Griffin, R great toe venous ulcer, R 5th toe venous ulcer.  Labs: sodium 133, Glucose 150, POCT 199 155 169, A1c 5.5% (of note low HH), BUN Cr WDL.   Please see below for nutritions recommendations. Recs made with team.

## 2022-07-18 NOTE — BRIEF OPERATIVE NOTE - OPERATION/FINDINGS
Right groin access. Major vessels open until distal SFA proximal to proximal anastomosis of remote pop interposition graft. Catheter advanced through to distal anastomosis. Unable to advance further. 10mg TPA given. 5Fr sheath left in place. TP trunk open with some runoff.
Procedure: LLE SFA to TP trunk bypass using ipsilateral rGSV, LLE angiogram  Indication: CLTI  Description: LLE SFA to TP trunk bypass performed using ipsilateral GSV and left subcutaneously. Completion angiogram showed peroneal outflow to foot with collaterals filling DP. Hemostasis achieved  IVF: 2000cc crystalloid, 500 5% albumin  EBL: 300cc  UOP: 600cc  Pulses: biphasic dp, monopashic PT

## 2022-07-18 NOTE — CONSULT NOTE ADULT - ASSESSMENT
77y.o M Hx HTN, HLD, DM, Pacemaker, Afib and PVD presents with LLE Critical limb ischemia. Now s/p SFA to TP trunk rGSV bypass (7/18) admitted to SICU for hemodynamic and respiratory monitoring.     Neuro: Pain control, tylenol PRN, oxycodone PRN, zofran PRN, agitation and pulling at lines: restraints  CV: MAP >65, ASA restart 7/19 AM, NS @80/hr HTN holding: chlorthalidone, nefidipine, losartan, propranolol. HLD: restart atorvastatin, fenofibrate Afib: holding Eliquis, plan for restart Hep gtt at 10pm pending aPTT  Vasc: s/p LLE SFA to TP trunk bypass with ipsilateral rGSV (, IVF 2000, 500 albumin, . S/p LLE angiogram with TPA (7/15)  Pulm: COPD: continue Duoneb q6, BiLevel qHS  GI: CLD  : Donahue  ID: Ancef x3  Endo: mISS  PPx: Heparin gtt/Hold SCD 2/2 PAD   Lines: Right radial a-line (7/18-7/18 self DC)  Wounds: LLE bypass dressing in place  PT/OT: Not ordered  Dispo: SICU

## 2022-07-18 NOTE — CONSULT NOTE ADULT - SUBJECTIVE AND OBJECTIVE BOX
HPI:  The pt is a 77M hx HTN, HLD, DM, Pacemaker, PVD, TAVR, c/o left leg pain x2 days. 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 Pt went to Urgent Care after pain started 2 days prior, he was then referred to the ED for further mgmt once it was established there was no flow to affected limb along with leg being mottled and cool. Pt was then seen at outside hospital in [place] and was told the stent was occluded, he was then seen by a vascular surgeon who did an angiogram one day prior and told pt the leg had to be amputated. Pt would like second a second opinion from Dr Capone.                     (15 Jul 2022 04:06)        PAST MEDICAL HISTORY:  HTN (hypertension)    High cholesterol    DM (diabetes mellitus)        PAST SURGICAL HISTORY:  Popliteal aneurysm    Pacemaker    S/P TAVR (transcatheter aortic valve replacement)        MEDICATIONS:      ALLERGIES:  No Known Allergies      SOCHX:   Social History:      FAMHX:   FAMILY HISTORY:        Vitals:  Vital Signs Last 24 Hrs  T(C): 36.1 (2022 10:42), Max: 37 (2022 21:52)  T(F): 97 (2022 10:42), Max: 98.6 (2022 21:52)  HR: 68 (2022 09:00) (62 - 70)  BP: 181/89 (2022 09:00) (165/79 - 182/91)  BP(mean): 114 (2022 00:26) (114 - 114)  RR: 19 (2022 09:00) (16 - 20)  SpO2: 94% (2022 09:00) (94% - 95%)    Parameters below as of 2022 09:00  Patient On (Oxygen Delivery Method): room air          PHYSICAL EXAM:  Physical Exam  General: NAD, resting comfortably in bed  Pulm: Nonlabored breathing, no respiratory distress  Abd: soft, non distended, incisions c/d/i, non tender  Extrem: WWP, no edema,  Neuro: A/O x 3, CNs II-XII grossly intact, no focal deficits, normal sensation  Pulses: palpable distal pulses     I&o's:  I&O's Summary    2022 07:01  -  2022 07:00  --------------------------------------------------------  IN: 342 mL / OUT: 1500 mL / NET: -1158 mL    2022 07:01  -  2022 18:51  --------------------------------------------------------  IN: 32 mL / OUT: 0 mL / NET: 32 mL        LABS:                        11.5   10.19 )-----------( 177      ( 2022 18:25 )             33.2     07-18    133<L>  |  96  |  23  ----------------------------<  150<H>  4.1   |  28  |  1.03    Ca    9.8      2022 05:30  Phos  2.5     07-18  Mg     1.9     07-18      Lactate:    PTT - ( 2022 18:25 )  PTT:108.3 sec  ABG - ( 2022 13:56 )  pH, Arterial: 7.48  pH, Blood: x     /  pCO2: 34    /  pO2: 448   / HCO3: 25    / Base Excess: 2.1   /  SaO2: x                       Urinalysis Basic - ( 2022 15:09 )    Color: Yellow / Appearance: Hazy / S.015 / pH: x  Gluc: x / Ketone: NEGATIVE  / Bili: Negative / Urobili: 4.0 E.U./dL   Blood: x / Protein: NEGATIVE mg/dL / Nitrite: NEGATIVE   Leuk Esterase: NEGATIVE / RBC: < 5 /HPF / WBC 5-10 /HPF   Sq Epi: x / Non Sq Epi: 0-5 /HPF / Bacteria: None /HPF        MICRO:     IMAGING:   HPI:  The pt is a 77M hx HTN, HLD, DM, Pacemaker, PVD, TAVR, c/o left leg pain x2 days. 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 Pt went to Urgent Care after pain started 2 days prior, he was then referred to the ED for further mgmt once it was established there was no flow to affected limb along with leg being mottled and cool. Pt was then seen at outside hospital in [place] and was told the stent was occluded, he was then seen by a vascular surgeon who did an angiogram one day prior and told pt the leg had to be amputated. Pt would like second a second opinion from Dr Capone.                     (15 Jul 2022 04:06)      SICU ADDENDUM  Above noted. Patient with multiple co-morbidities admitted for CLI. Proceeded to operating room today for LLE SFA to tibial trunk bypass with rGSV (, 2000 crystalloid 500 albumin). Patient confused and disoriented in PACU, removed A-line, BiPAP applied.     PAST MEDICAL HISTORY:  HTN (hypertension)    High cholesterol    DM (diabetes mellitus)        PAST SURGICAL HISTORY:  Popliteal aneurysm    Pacemaker    S/P TAVR (transcatheter aortic valve replacement)        MEDICATIONS:      ALLERGIES:  No Known Allergies      SOCHX:   Social History:      FAMHX:   FAMILY HISTORY:        Vitals:  Vital Signs Last 24 Hrs  T(C): 36.1 (2022 10:42), Max: 37 (2022 21:52)  T(F): 97 (2022 10:42), Max: 98.6 (2022 21:52)  HR: 68 (2022 09:00) (62 - 70)  BP: 181/89 (2022 09:00) (165/79 - 182/91)  BP(mean): 114 (2022 00:26) (114 - 114)  RR: 19 (2022 09:00) (16 - 20)  SpO2: 94% (2022 09:00) (94% - 95%)    Parameters below as of 2022 09:00  Patient On (Oxygen Delivery Method): room air        PHYSICAL EXAM:  Physical Exam  General: NAD, resting comfortably in bed  Pulm: Nonlabored breathing, no respiratory distress sat well on NC, BiPAP at bedside   Abd: soft, non distended no rebound or guardin  Extrem: LLE bypass dressing in place, moderate saturation sup>inf, mon PT Biphasic DP, leg with moderate mottling, cool to touch  Neuro: A/O x 1,     I&o's:  I&O's Summary    2022 07:01  -  2022 07:00  --------------------------------------------------------  IN: 342 mL / OUT: 1500 mL / NET: -1158 mL    2022 07:01  -  2022 18:51  --------------------------------------------------------  IN: 32 mL / OUT: 0 mL / NET: 32 mL        LABS:                        11.5   10.19 )-----------( 177      ( 2022 18:25 )             33.2     07-18    133<L>  |  96  |  23  ----------------------------<  150<H>  4.1   |  28  |  1.03    Ca    9.8      2022 05:30  Phos  2.5     07-18  Mg     1.9     07-18      Lactate:    PTT - ( 2022 18:25 )  PTT:108.3 sec  ABG - ( 2022 13:56 )  pH, Arterial: 7.48  pH, Blood: x     /  pCO2: 34    /  pO2: 448   / HCO3: 25    / Base Excess: 2.1   /  SaO2: x                       Urinalysis Basic - ( 2022 15:09 )    Color: Yellow / Appearance: Hazy / S.015 / pH: x  Gluc: x / Ketone: NEGATIVE  / Bili: Negative / Urobili: 4.0 E.U./dL   Blood: x / Protein: NEGATIVE mg/dL / Nitrite: NEGATIVE   Leuk Esterase: NEGATIVE / RBC: < 5 /HPF / WBC 5-10 /HPF   Sq Epi: x / Non Sq Epi: 0-5 /HPF / Bacteria: None /HPF

## 2022-07-18 NOTE — DIETITIAN INITIAL EVALUATION ADULT - PERTINENT MEDS FT
MEDICATIONS  (STANDING):  allopurinol 100 milliGRAM(s) Oral daily  aspirin enteric coated 81 milliGRAM(s) Oral daily  atorvastatin 40 milliGRAM(s) Oral at bedtime  carBAMazepine 200 milliGRAM(s) Oral two times a day  dextrose 5%. 1000 milliLiter(s) (50 mL/Hr) IV Continuous <Continuous>  dextrose 5%. 1000 milliLiter(s) (100 mL/Hr) IV Continuous <Continuous>  dextrose 50% Injectable 25 Gram(s) IV Push once  dextrose 50% Injectable 12.5 Gram(s) IV Push once  dextrose 50% Injectable 25 Gram(s) IV Push once  fenofibrate Tablet 145 milliGRAM(s) Oral daily  glucagon  Injectable 1 milliGRAM(s) IntraMuscular once  heparin  Infusion 1400 Unit(s)/Hr (16 mL/Hr) IV Continuous <Continuous>  insulin lispro (ADMELOG) corrective regimen sliding scale   SubCutaneous three times a day before meals  magnesium sulfate  IVPB 1 Gram(s) IV Intermittent once  melatonin 5 milliGRAM(s) Oral at bedtime  methocarbamol 500 milliGRAM(s) Oral three times a day  NIFEdipine XL 60 milliGRAM(s) Oral daily  pantoprazole    Tablet 40 milliGRAM(s) Oral before breakfast  propranolol 10 milliGRAM(s) Oral three times a day  sodium chloride 0.9%. 1000 milliLiter(s) (80 mL/Hr) IV Continuous <Continuous>  tamsulosin 0.4 milliGRAM(s) Oral at bedtime  traZODone 150 milliGRAM(s) Oral at bedtime  venlafaxine 100 milliGRAM(s) Oral two times a day with meals    MEDICATIONS  (PRN):  clonazePAM  Tablet 1 milliGRAM(s) Oral every 6 hours PRN anxiety  dextrose Oral Gel 15 Gram(s) Oral once PRN Blood Glucose LESS THAN 70 milliGRAM(s)/deciliter  oxycodone    5 mG/acetaminophen 325 mG 2 Tablet(s) Oral every 4 hours PRN Severe Pain (7 - 10)

## 2022-07-18 NOTE — PROGRESS NOTE ADULT - SUBJECTIVE AND OBJECTIVE BOX
O/N: CARIDAD SALMERON                                                  --------------------------------------------------------------------------  PLEASE CHECK WHEN PRESENT:     [  ] Heart Failure     [  ] Acute     [  ] Acute on Chronic     [  ] Chronic  -------------------------------------------------------------------     [ x ]Diastolic [HFpEF]     [  ]Systolic [HFrEF]     [  ]Combined [HFpEF & HFrEF]     [x  ] afib     [ x ] hypertensive heart disease     [  ]Other:  -------------------------------------------------------------------  [ ] Respiratory failure  [ ] Acute cor pulmonale  [ ] Asthma/COPD Exacerbation  [ ] Pleural effusion  [ ] Aspiration pneumonia  -------------------------------------------------------------------  [  ]TANIKA     [  ]ATN     [  ]Reneal Medullary Necrosis     [  ]Renal Cortical Necrosis     [  ]Other Pathological Lesions:    [  ]CKD 1  [  ]CKD 2  [  ]CKD 3  [  ]CKD 4  [  ]CKD 5  [  ]Other  -------------------------------------------------------------------  [x  ]Diabetes  [  ] Diabetic PVD Ulcer  [  ] Neuropathic ulcer to DM  [  ] Diabetes with Nephropathy  [  ] Osteomyelitis due to diabetes  --------------------------------------------------------------------  [  ]Malnutrition: See Nutrition Note  [  ]Cachexia  [  ]Other:   [  ]Supplement Ordered:  [  ]Morbid Obesity (BMI >=40]  ---------------------------------------------------------------------  [ ] Sepsis/severe sepsis/septic shock  [ ] UTI  [ ] Pneumonia  -----------------------------------------------------------------------  [ ] Acidosis/alkalosis  [ ] Fluid overload  [ ] Hypokalemia  [ ] Hyperkalemia  [ ] Hypomagnesemia  [ ] Hypophosphatemia  [ ] Hyperphosphatemia  ------------------------------------------------------------------------  [ ] Acute blood loss anemia  [ ] Post op blood loss anemia  [ ] Iron deficiency anemia  [ ] Anemia due to chronic disease  [ ] Hypercoagulable state  ----------------------------------------------------------------------  [ ] Cerebral infarction  [ ] Transient ischemia attack  [ ] Encephalopathy        77y.o M Hx HTN, HLD, DM, Pacemaker, Afib and PVD presents with LLE Critical limb ischemia.    #Vascular  s/p diagnostic angiogram 7/15 unable to cross occluded popliteal stent  Continue heparin gtt  Plan for bypass Monday 7/18,  preop and consent  Vein mapping done  Outpatient ECHO in chart    #Diastolic dysfunction/Pacemaker  -f/u cardiology recs  -Call EP regarding pacemaker settings for OR    #Afib  Continue heparin gtt    #DM  Continue moderate ISS    #HTN  Continue home nifedipine    #HLD  Continue home statin + fenofibrate    #Bipolar Disorder  Continue home meds     DVTppx: Heparin Gtt    Dispo: Plan for OR Monday     O/N: JARON, VSS    Subjective: Patient seen and examined at bedside.     ROS:   Denies Headache, blurred vision, Chest Pain, SOB, Abdominal pain, nausea or vomiting     Social   aspirin enteric coated 81  heparin  Infusion 1400  NIFEdipine XL 60  propranolol 10  tamsulosin 0.4      Allergies    No Known Allergies    Intolerances        Vital Signs Last 24 Hrs  T(C): 36.2 (18 Jul 2022 05:21), Max: 37 (17 Jul 2022 21:52)  T(F): 97.1 (18 Jul 2022 05:21), Max: 98.6 (17 Jul 2022 21:52)  HR: 70 (18 Jul 2022 00:26) (62 - 70)  BP: 165/79 (18 Jul 2022 00:26) (156/82 - 182/91)  BP(mean): 114 (18 Jul 2022 00:26) (114 - 114)  RR: 20 (18 Jul 2022 00:26) (16 - 20)  SpO2: 95% (18 Jul 2022 00:26) (94% - 95%)    Parameters below as of 18 Jul 2022 00:26  Patient On (Oxygen Delivery Method): room air      I&O's Summary    17 Jul 2022 07:01  -  18 Jul 2022 07:00  --------------------------------------------------------  IN: 166 mL / OUT: 1275 mL / NET: -1109 mL        Physical Exam:  Constitutional: A&OX3 in NAD  Respiratory: Unlabored  Cardiovascular: RRR  Extremities: LLE Mottled and cool to touch to below knee  Vascular: RLE: mono DP/PT palp pop & Fem   LLE: no DP/PT, Biphasic Pop, palp fem  Neurological: Motor Sensory decreased      LABS:                        12.5   7.40  )-----------( 140      ( 17 Jul 2022 05:30 )             36.1     07-17    133<L>  |  97  |  27<H>  ----------------------------<  166<H>  3.8   |  24  |  1.18    Ca    9.2      17 Jul 2022 05:30  Phos  2.6     07-17  Mg     1.8     07-17      PTT - ( 17 Jul 2022 05:30 )  PTT:64.7 sec    --------------------------------------------------------------------------  PLEASE CHECK WHEN PRESENT:     [  ] Heart Failure     [  ] Acute     [  ] Acute on Chronic     [  ] Chronic  -------------------------------------------------------------------     [ x ]Diastolic [HFpEF]     [  ]Systolic [HFrEF]     [  ]Combined [HFpEF & HFrEF]     [x  ] afib     [ x ] hypertensive heart disease     [  ]Other:  -------------------------------------------------------------------  [ ] Respiratory failure  [ ] Acute cor pulmonale  [ ] Asthma/COPD Exacerbation  [ ] Pleural effusion  [ ] Aspiration pneumonia  -------------------------------------------------------------------  [  ]TANIKA     [  ]ATN     [  ]Reneal Medullary Necrosis     [  ]Renal Cortical Necrosis     [  ]Other Pathological Lesions:    [  ]CKD 1  [  ]CKD 2  [  ]CKD 3  [  ]CKD 4  [  ]CKD 5  [  ]Other  -------------------------------------------------------------------  [x  ]Diabetes  [  ] Diabetic PVD Ulcer  [  ] Neuropathic ulcer to DM  [  ] Diabetes with Nephropathy  [  ] Osteomyelitis due to diabetes  --------------------------------------------------------------------  [  ]Malnutrition: See Nutrition Note  [  ]Cachexia  [  ]Other:   [  ]Supplement Ordered:  [  ]Morbid Obesity (BMI >=40]  ---------------------------------------------------------------------  [ ] Sepsis/severe sepsis/septic shock  [ ] UTI  [ ] Pneumonia  -----------------------------------------------------------------------  [ ] Acidosis/alkalosis  [ ] Fluid overload  [ ] Hypokalemia  [ ] Hyperkalemia  [ ] Hypomagnesemia  [ ] Hypophosphatemia  [ ] Hyperphosphatemia  ------------------------------------------------------------------------  [ ] Acute blood loss anemia  [ ] Post op blood loss anemia  [ ] Iron deficiency anemia  [ ] Anemia due to chronic disease  [ ] Hypercoagulable state  ----------------------------------------------------------------------  [ ] Cerebral infarction  [ ] Transient ischemia attack  [ ] Encephalopathy        77y.o M Hx HTN, HLD, DM, Pacemaker, Afib and PVD presents with LLE Critical limb ischemia.    #Vascular  s/p diagnostic angiogram 7/15 unable to cross occluded popliteal stent  Continue heparin gtt  Plan for bypass today 7/18,  preopped and consented  Vein mapping done  Outpatient ECHO in chart    #Diastolic dysfunction/Pacemaker  -f/u cardiology recs  -Call EP regarding pacemaker settings for OR    #Afib  Continue heparin gtt    #DM  Continue moderate ISS    #HTN  Continue home nifedipine    #HLD  Continue home statin + fenofibrate    #Bipolar Disorder  Continue home meds     DVTppx: Heparin Gtt    Dispo: Plan for OR today

## 2022-07-18 NOTE — DIETITIAN INITIAL EVALUATION ADULT - ADD RECOMMEND
1. Diet to be advanced in 24-48hrs. Can resume prior diet order (DASH/kosher - add CONSCHO PRN Pending BG/POCT).  2. Monitor PO intake/appetite, GI distress (+add bowel reg PRN), diet tolerance, labs, weights.  3. Honor pt food preferences as able.  4. MVI, Vit C 500mg/day.   5. RD to remain available for additional nutrition interventions as needed.   ** Moderate Nutrition Risk.

## 2022-07-18 NOTE — BRIEF OPERATIVE NOTE - NSICDXBRIEFPOSTOP_GEN_ALL_CORE_FT
POST-OP DIAGNOSIS:  Thrombosis of arterial graft 15-Jul-2022 14:10:14  Rojelio Hackett  
POST-OP DIAGNOSIS:  Critical lower limb ischemia 18-Jul-2022 17:45:05  Melony Phillips

## 2022-07-18 NOTE — BRIEF OPERATIVE NOTE - NSICDXBRIEFPREOP_GEN_ALL_CORE_FT
PRE-OP DIAGNOSIS:  Thrombosis of arterial graft 15-Jul-2022 14:09:31  Rojelio Hackett  
PRE-OP DIAGNOSIS:  Critical lower limb ischemia 18-Jul-2022 17:44:48  Melony Phillips

## 2022-07-18 NOTE — DIETITIAN INITIAL EVALUATION ADULT - PERTINENT LABORATORY DATA
07-18    133<L>  |  96  |  23  ----------------------------<  150<H>  4.1   |  28  |  1.03    Ca    9.8      18 Jul 2022 05:30  Phos  2.5     07-18  Mg     1.9     07-18    POCT Blood Glucose.: 199 mg/dL (07-17-22 @ 17:21)  A1C with Estimated Average Glucose Result: 5.5 % (07-17-22 @ 05:30)

## 2022-07-19 LAB
ANION GAP SERPL CALC-SCNC: 11 MMOL/L — SIGNIFICANT CHANGE UP (ref 5–17)
ANION GAP SERPL CALC-SCNC: 12 MMOL/L — SIGNIFICANT CHANGE UP (ref 5–17)
APTT BLD: 27.1 SEC — LOW (ref 27.5–35.5)
APTT BLD: 29.4 SEC — SIGNIFICANT CHANGE UP (ref 27.5–35.5)
APTT BLD: 32.8 SEC — SIGNIFICANT CHANGE UP (ref 27.5–35.5)
BASOPHILS # BLD AUTO: 0.03 K/UL — SIGNIFICANT CHANGE UP (ref 0–0.2)
BASOPHILS NFR BLD AUTO: 0.2 % — SIGNIFICANT CHANGE UP (ref 0–2)
BUN SERPL-MCNC: 17 MG/DL — SIGNIFICANT CHANGE UP (ref 7–23)
BUN SERPL-MCNC: 20 MG/DL — SIGNIFICANT CHANGE UP (ref 7–23)
CALCIUM SERPL-MCNC: 8.7 MG/DL — SIGNIFICANT CHANGE UP (ref 8.4–10.5)
CALCIUM SERPL-MCNC: 8.7 MG/DL — SIGNIFICANT CHANGE UP (ref 8.4–10.5)
CHLORIDE SERPL-SCNC: 97 MMOL/L — SIGNIFICANT CHANGE UP (ref 96–108)
CHLORIDE SERPL-SCNC: 99 MMOL/L — SIGNIFICANT CHANGE UP (ref 96–108)
CK SERPL-CCNC: CRITICAL HIGH U/L (ref 30–200)
CO2 SERPL-SCNC: 23 MMOL/L — SIGNIFICANT CHANGE UP (ref 22–31)
CO2 SERPL-SCNC: 24 MMOL/L — SIGNIFICANT CHANGE UP (ref 22–31)
CREAT SERPL-MCNC: 0.97 MG/DL — SIGNIFICANT CHANGE UP (ref 0.5–1.3)
CREAT SERPL-MCNC: 1.1 MG/DL — SIGNIFICANT CHANGE UP (ref 0.5–1.3)
EGFR: 69 ML/MIN/1.73M2 — SIGNIFICANT CHANGE UP
EGFR: 80 ML/MIN/1.73M2 — SIGNIFICANT CHANGE UP
EOSINOPHIL # BLD AUTO: 0.04 K/UL — SIGNIFICANT CHANGE UP (ref 0–0.5)
EOSINOPHIL NFR BLD AUTO: 0.3 % — SIGNIFICANT CHANGE UP (ref 0–6)
GLUCOSE BLDC GLUCOMTR-MCNC: 147 MG/DL — HIGH (ref 70–99)
GLUCOSE BLDC GLUCOMTR-MCNC: 147 MG/DL — HIGH (ref 70–99)
GLUCOSE BLDC GLUCOMTR-MCNC: 156 MG/DL — HIGH (ref 70–99)
GLUCOSE BLDC GLUCOMTR-MCNC: 163 MG/DL — HIGH (ref 70–99)
GLUCOSE SERPL-MCNC: 146 MG/DL — HIGH (ref 70–99)
GLUCOSE SERPL-MCNC: 156 MG/DL — HIGH (ref 70–99)
HCT VFR BLD CALC: 31.7 % — LOW (ref 39–50)
HGB BLD-MCNC: 11.1 G/DL — LOW (ref 13–17)
IMM GRANULOCYTES NFR BLD AUTO: 0.5 % — SIGNIFICANT CHANGE UP (ref 0–1.5)
INR BLD: 1.2 — HIGH (ref 0.88–1.16)
LYMPHOCYTES # BLD AUTO: 0.62 K/UL — LOW (ref 1–3.3)
LYMPHOCYTES # BLD AUTO: 4.8 % — LOW (ref 13–44)
MAGNESIUM SERPL-MCNC: 1.6 MG/DL — SIGNIFICANT CHANGE UP (ref 1.6–2.6)
MCHC RBC-ENTMCNC: 32.2 PG — SIGNIFICANT CHANGE UP (ref 27–34)
MCHC RBC-ENTMCNC: 35 GM/DL — SIGNIFICANT CHANGE UP (ref 32–36)
MCV RBC AUTO: 91.9 FL — SIGNIFICANT CHANGE UP (ref 80–100)
MONOCYTES # BLD AUTO: 1.42 K/UL — HIGH (ref 0–0.9)
MONOCYTES NFR BLD AUTO: 11 % — SIGNIFICANT CHANGE UP (ref 2–14)
NEUTROPHILS # BLD AUTO: 10.7 K/UL — HIGH (ref 1.8–7.4)
NEUTROPHILS NFR BLD AUTO: 83.2 % — HIGH (ref 43–77)
NRBC # BLD: 0 /100 WBCS — SIGNIFICANT CHANGE UP (ref 0–0)
PHOSPHATE SERPL-MCNC: 3.2 MG/DL — SIGNIFICANT CHANGE UP (ref 2.5–4.5)
PLATELET # BLD AUTO: 176 K/UL — SIGNIFICANT CHANGE UP (ref 150–400)
POTASSIUM SERPL-MCNC: 3.7 MMOL/L — SIGNIFICANT CHANGE UP (ref 3.5–5.3)
POTASSIUM SERPL-MCNC: 4.4 MMOL/L — SIGNIFICANT CHANGE UP (ref 3.5–5.3)
POTASSIUM SERPL-SCNC: 3.7 MMOL/L — SIGNIFICANT CHANGE UP (ref 3.5–5.3)
POTASSIUM SERPL-SCNC: 4.4 MMOL/L — SIGNIFICANT CHANGE UP (ref 3.5–5.3)
PROTHROM AB SERPL-ACNC: 14.3 SEC — HIGH (ref 10.5–13.4)
RBC # BLD: 3.45 M/UL — LOW (ref 4.2–5.8)
RBC # FLD: 13.4 % — SIGNIFICANT CHANGE UP (ref 10.3–14.5)
SODIUM SERPL-SCNC: 133 MMOL/L — LOW (ref 135–145)
SODIUM SERPL-SCNC: 133 MMOL/L — LOW (ref 135–145)
WBC # BLD: 12.87 K/UL — HIGH (ref 3.8–10.5)
WBC # FLD AUTO: 12.87 K/UL — HIGH (ref 3.8–10.5)

## 2022-07-19 PROCEDURE — 99233 SBSQ HOSP IP/OBS HIGH 50: CPT

## 2022-07-19 PROCEDURE — 99233 SBSQ HOSP IP/OBS HIGH 50: CPT | Mod: GC

## 2022-07-19 PROCEDURE — 93306 TTE W/DOPPLER COMPLETE: CPT | Mod: 26

## 2022-07-19 RX ORDER — HEPARIN SODIUM 5000 [USP'U]/ML
900 INJECTION INTRAVENOUS; SUBCUTANEOUS
Qty: 25000 | Refills: 0 | Status: DISCONTINUED | OUTPATIENT
Start: 2022-07-19 | End: 2022-07-23

## 2022-07-19 RX ORDER — ASPIRIN/CALCIUM CARB/MAGNESIUM 324 MG
81 TABLET ORAL DAILY
Refills: 0 | Status: DISCONTINUED | OUTPATIENT
Start: 2022-07-20 | End: 2022-07-25

## 2022-07-19 RX ORDER — VENLAFAXINE HCL 75 MG
37.5 CAPSULE, EXT RELEASE 24 HR ORAL EVERY 12 HOURS
Refills: 0 | Status: DISCONTINUED | OUTPATIENT
Start: 2022-07-20 | End: 2022-07-20

## 2022-07-19 RX ORDER — MAGNESIUM SULFATE 500 MG/ML
1 VIAL (ML) INJECTION ONCE
Refills: 0 | Status: COMPLETED | OUTPATIENT
Start: 2022-07-19 | End: 2022-07-19

## 2022-07-19 RX ORDER — TRAZODONE HCL 50 MG
150 TABLET ORAL AT BEDTIME
Refills: 0 | Status: DISCONTINUED | OUTPATIENT
Start: 2022-07-19 | End: 2022-07-20

## 2022-07-19 RX ORDER — HEPARIN SODIUM 5000 [USP'U]/ML
500 INJECTION INTRAVENOUS; SUBCUTANEOUS
Qty: 25000 | Refills: 0 | Status: DISCONTINUED | OUTPATIENT
Start: 2022-07-19 | End: 2022-07-19

## 2022-07-19 RX ADMIN — Medication 100 GRAM(S): at 12:43

## 2022-07-19 RX ADMIN — Medication 200 MILLIGRAM(S): at 06:49

## 2022-07-19 RX ADMIN — Medication 2: at 11:30

## 2022-07-19 RX ADMIN — HEPARIN SODIUM 5 UNIT(S)/HR: 5000 INJECTION INTRAVENOUS; SUBCUTANEOUS at 08:20

## 2022-07-19 RX ADMIN — Medication 100 MILLIGRAM(S): at 14:10

## 2022-07-19 RX ADMIN — CHLORHEXIDINE GLUCONATE 1 APPLICATION(S): 213 SOLUTION TOPICAL at 06:49

## 2022-07-19 RX ADMIN — TAMSULOSIN HYDROCHLORIDE 0.4 MILLIGRAM(S): 0.4 CAPSULE ORAL at 22:30

## 2022-07-19 RX ADMIN — SODIUM CHLORIDE 250 MILLILITER(S): 9 INJECTION INTRAMUSCULAR; INTRAVENOUS; SUBCUTANEOUS at 16:33

## 2022-07-19 RX ADMIN — Medication 5 MILLIGRAM(S): at 06:20

## 2022-07-19 RX ADMIN — HYDROMORPHONE HYDROCHLORIDE 0.25 MILLIGRAM(S): 2 INJECTION INTRAMUSCULAR; INTRAVENOUS; SUBCUTANEOUS at 00:00

## 2022-07-19 RX ADMIN — Medication 100 MILLIGRAM(S): at 08:45

## 2022-07-19 RX ADMIN — Medication 100 MILLIGRAM(S): at 06:49

## 2022-07-19 RX ADMIN — Medication 500 MILLIGRAM(S): at 11:10

## 2022-07-19 RX ADMIN — PANTOPRAZOLE SODIUM 40 MILLIGRAM(S): 20 TABLET, DELAYED RELEASE ORAL at 06:49

## 2022-07-19 RX ADMIN — Medication 2: at 23:00

## 2022-07-19 RX ADMIN — Medication 200 MILLIGRAM(S): at 18:04

## 2022-07-19 RX ADMIN — Medication 5 MILLIGRAM(S): at 11:10

## 2022-07-19 RX ADMIN — Medication 150 MILLIGRAM(S): at 23:53

## 2022-07-19 RX ADMIN — Medication 1 TABLET(S): at 11:09

## 2022-07-19 RX ADMIN — ATORVASTATIN CALCIUM 40 MILLIGRAM(S): 80 TABLET, FILM COATED ORAL at 22:30

## 2022-07-19 RX ADMIN — Medication 5 MILLIGRAM(S): at 22:30

## 2022-07-19 RX ADMIN — Medication 5 MILLIGRAM(S): at 18:04

## 2022-07-19 RX ADMIN — Medication 1 MILLIGRAM(S): at 21:25

## 2022-07-19 RX ADMIN — Medication 1 MILLIGRAM(S): at 01:08

## 2022-07-19 NOTE — PROGRESS NOTE ADULT - SUBJECTIVE AND OBJECTIVE BOX
STATUS POST:       SUBJECTIVE: Patient seen and examined bedside by chief resident.    ceFAZolin   IVPB 2000 milliGRAM(s) IV Intermittent every 8 hours  metoprolol tartrate Injectable 5 milliGRAM(s) IV Push every 6 hours  tamsulosin 0.4 milliGRAM(s) Oral at bedtime      Vital Signs Last 24 Hrs  T(C): 37.2 (2022 05:11), Max: 37.2 (2022 05:11)  T(F): 98.9 (2022 05:11), Max: 98.9 (2022 05:11)  HR: 60 (2022 06:00) (60 - 70)  BP: 153/72 (2022 22:00) (153/72 - 202/)  BP(mean): 106 (2022 22:00) (96 - 137)  RR: 22 (2022 06:00) (18 - 27)  SpO2: 100% (2022 06:00) (94% - 100%)    Parameters below as of 2022 06:00  Patient On (Oxygen Delivery Method): BiPAP/CPAP    O2 Concentration (%): 35  I&O's Detail    2022 07:01  -  2022 07:00  --------------------------------------------------------  IN:    Heparin: 192 mL    Oral Fluid: 150 mL  Total IN: 342 mL    OUT:    Voided (mL): 1500 mL  Total OUT: 1500 mL    Total NET: -1158 mL      2022 07:01  -  2022 06:37  --------------------------------------------------------  IN:    Heparin: 32 mL    IV PiggyBack: 150 mL    sodium chloride 0.9%: 1120 mL  Total IN: 1302 mL    OUT:    Indwelling Catheter - Urethral (mL): 1070 mL    Oral Fluid: 0 mL  Total OUT: 1070 mL    Total NET: 232 mL    General: NAD, resting comfortably in bed  C/V: NSR  Pulm: Nonlabored breathing, no respiratory distress  Abd: soft, NT/ND.  Extrem: WWP, no edema, SCDs in place        LABS:                        11.1   12.87 )-----------( 176      ( 2022 05:30 )             31.7     07-19    x   |  97  |  20  ----------------------------<  156<H>  4.4   |  24  |  1.10    Ca    8.7      2022 05:30  Phos  3.2     07-19  Mg     1.6     07-19      PT/INR - ( 2022 05:30 )   PT: 14.3 sec;   INR: 1.20          PTT - ( 2022 05:30 )  PTT:27.1 sec  Urinalysis Basic - ( 2022 15:09 )    Color: Yellow / Appearance: Hazy / S.015 / pH: x  Gluc: x / Ketone: NEGATIVE  / Bili: Negative / Urobili: 4.0 E.U./dL   Blood: x / Protein: NEGATIVE mg/dL / Nitrite: NEGATIVE   Leuk Esterase: NEGATIVE / RBC: < 5 /HPF / WBC 5-10 /HPF   Sq Epi: x / Non Sq Epi: 0-5 /HPF / Bacteria: None /HPF        RADIOLOGY & ADDITIONAL STUDIES:   Interval Events:   ON: Pt was transferred to SICU. He was confused and alert to name only. Pt ripped out his david. He was placed on BiPAP 40% 8/5;  upon arrival to SICU room- received 10 labetalol x1. POCUS showed an a-line pattern throughout with no effusions and an irregular pleural line; Repeat SBP back in 190s and he was given 20 IV labetalol x1; New R radial david placed. CK came back 11k. Sensation intact in LLE. Increased IVF from 80 --> 130/hr. At 1 AM, patient had an anxiety attack and was given 1mg IV Ativan x1 (on Klonipin 1mg @home)     Patient seen and examined at bedside. He is doing well this AM. Denies N/V/F/C, chest pain or SOB. No pain in the abdomen or extremities. He attests to flatus but no bowel movements.     Allergies    No Known Allergies    Intolerances        Vital Signs Last 24 Hrs  T(C): 37.2 (2022 05:11), Max: 37.2 (2022 05:11)  T(F): 98.9 (2022 05:11), Max: 98.9 (2022 05:11)  HR: 60 (2022 06:00) (60 - 70)  BP: 153/72 (2022 22:00) (153/72 - 202/95)  BP(mean): 106 (2022 22:00) (96 - 137)  RR: 22 (2022 06:00) (18 - 27)  SpO2: 100% (2022 06:00) (94% - 100%)    Parameters below as of 2022 06:00  Patient On (Oxygen Delivery Method): BiPAP/CPAP    O2 Concentration (%): 35     @ 07:  -  - @ 07:00  --------------------------------------------------------  IN: 1302 mL / OUT: 1070 mL / NET: 232 mL       @ 07:  -   @ 07:00  --------------------------------------------------------  IN: 1302 mL / OUT: 1070 mL / NET: 232 mL        Physical Exam:     Gen: NAD well nourished, lying comfortably in bed.  Neuro: A&OX3 No deficits. Sensation intact in b/l LE  CV: RRR, Grade 3 systolic murmur.  Pulm: CTA b/l No w/r/r  Abd: Soft NT ND + BS  Ext: No C/C/E   Vasc: Palpable R DP and pop.   Skin: Mottled b/l LE L worse than R. Occasional bullae on R LE.  MSK: No joint swelling  Psych: No signs of anxiety or depression      LABS:  ABG - ( 2022 19:40 )  pH, Arterial: 7.39  pH, Blood: x     /  pCO2: 46    /  pO2: 120   / HCO3: 28    / Base Excess: 2.2   /  SaO2: 98.7          CBC Full  -  ( 2022 05:30 )  WBC Count : 12.87 K/uL  RBC Count : 3.45 M/uL  Hemoglobin : 11.1 g/dL  Hematocrit : 31.7 %  Platelet Count - Automated : 176 K/uL  Mean Cell Volume : 91.9 fl  Mean Cell Hemoglobin : 32.2 pg  Mean Cell Hemoglobin Concentration : 35.0 gm/dL  Auto Neutrophil # : 10.70 K/uL  Auto Lymphocyte # : 0.62 K/uL  Auto Monocyte # : 1.42 K/uL  Auto Eosinophil # : 0.04 K/uL  Auto Basophil # : 0.03 K/uL  Auto Neutrophil % : 83.2 %  Auto Lymphocyte % : 4.8 %  Auto Monocyte % : 11.0 %  Auto Eosinophil % : 0.3 %  Auto Basophil % : 0.2 %        133<L>  |  97  |  20  ----------------------------<  156<H>  4.4   |  24  |  1.10    Ca    8.7      2022 05:30  Phos  3.2       Mg     1.6           PT/INR - ( 2022 05:30 )   PT: 14.3 sec;   INR: 1.20          PTT - ( 2022 05:30 )  PTT:27.1 sec      Urinalysis Basic - ( 2022 15:09 )    Color: Yellow / Appearance: Hazy / S.015 / pH: x  Gluc: x / Ketone: NEGATIVE  / Bili: Negative / Urobili: 4.0 E.U./dL   Blood: x / Protein: NEGATIVE mg/dL / Nitrite: NEGATIVE   Leuk Esterase: NEGATIVE / RBC: < 5 /HPF / WBC 5-10 /HPF   Sq Epi: x / Non Sq Epi: 0-5 /HPF / Bacteria: None /HPF              RADIOLOGY & ADDITIONAL STUDIES (The following images were personally reviewed):          A/p: 77yMale Interval Events:   ON: Pt was transferred to SICU. He was confused and alert to name only. Pt ripped out his david. He was placed on BiPAP 40% 8/5;  upon arrival to SICU room- received 10 labetalol x1. POCUS showed an a-line pattern throughout with no effusions and an irregular pleural line; Repeat SBP back in 190s and he was given 20 IV labetalol x1; New R radial david placed. CK came back 11k. Sensation intact in LLE. Increased IVF from 80 --> 130/hr. At 1 AM, patient had an anxiety attack and was given 1mg IV Ativan x1 (on Klonipin 1mg @home)     Patient seen and examined at bedside. He is doing well this AM. Denies N/V/F/C, chest pain or SOB. No pain in the abdomen or extremities. He attests to flatus but no bowel movements.     Allergies    No Known Allergies    Intolerances        Vital Signs Last 24 Hrs  T(C): 37.2 (2022 05:11), Max: 37.2 (2022 05:11)  T(F): 98.9 (2022 05:11), Max: 98.9 (2022 05:11)  HR: 60 (2022 06:00) (60 - 70)  BP: 153/72 (2022 22:00) (153/72 - 202/95)  BP(mean): 106 (2022 22:00) (96 - 137)  RR: 22 (2022 06:00) (18 - 27)  SpO2: 100% (2022 06:00) (94% - 100%)    Parameters below as of 2022 06:00  Patient On (Oxygen Delivery Method): BiPAP/CPAP    O2 Concentration (%): 35     @ 07:  -  - @ 07:00  --------------------------------------------------------  IN: 1302 mL / OUT: 1070 mL / NET: 232 mL       @ 07:  -   @ 07:00  --------------------------------------------------------  IN: 1302 mL / OUT: 1070 mL / NET: 232 mL        Physical Exam:     Gen: NAD well nourished, lying comfortably in bed.  Neuro: A&OX3 No deficits. Sensation intact in b/l LE  CV: RRR, Grade 3 systolic murmur.  Pulm: CTA b/l No w/r/r  Abd: Soft NT ND + BS  Ext: Dressing on medial L LE intact w/occasional points of blood.  Vasc: Palpable R DP and pop.   Skin: Mottled b/l LE L worse than R. Occasional bullae on R LE.  MSK: No joint swelling  Psych: No signs of anxiety or depression      LABS:  ABG - ( 2022 19:40 )  pH, Arterial: 7.39  pH, Blood: x     /  pCO2: 46    /  pO2: 120   / HCO3: 28    / Base Excess: 2.2   /  SaO2: 98.7          CBC Full  -  ( 2022 05:30 )  WBC Count : 12.87 K/uL  RBC Count : 3.45 M/uL  Hemoglobin : 11.1 g/dL  Hematocrit : 31.7 %  Platelet Count - Automated : 176 K/uL  Mean Cell Volume : 91.9 fl  Mean Cell Hemoglobin : 32.2 pg  Mean Cell Hemoglobin Concentration : 35.0 gm/dL  Auto Neutrophil # : 10.70 K/uL  Auto Lymphocyte # : 0.62 K/uL  Auto Monocyte # : 1.42 K/uL  Auto Eosinophil # : 0.04 K/uL  Auto Basophil # : 0.03 K/uL  Auto Neutrophil % : 83.2 %  Auto Lymphocyte % : 4.8 %  Auto Monocyte % : 11.0 %  Auto Eosinophil % : 0.3 %  Auto Basophil % : 0.2 %        133<L>  |  97  |  20  ----------------------------<  156<H>  4.4   |  24  |  1.10    Ca    8.7      2022 05:30  Phos  3.2       Mg     1.6     07-19      PT/INR - ( 2022 05:30 )   PT: 14.3 sec;   INR: 1.20          PTT - ( 2022 05:30 )  PTT:27.1 sec      Urinalysis Basic - ( 2022 15:09 )    Color: Yellow / Appearance: Hazy / S.015 / pH: x  Gluc: x / Ketone: NEGATIVE  / Bili: Negative / Urobili: 4.0 E.U./dL   Blood: x / Protein: NEGATIVE mg/dL / Nitrite: NEGATIVE   Leuk Esterase: NEGATIVE / RBC: < 5 /HPF / WBC 5-10 /HPF   Sq Epi: x / Non Sq Epi: 0-5 /HPF / Bacteria: None /HPF              RADIOLOGY & ADDITIONAL STUDIES (The following images were personally reviewed):          A/p: 77yMale Interval Events:   ON: Pt was transferred to SICU. He was confused and alert to name only. Pt ripped out his david. He was placed on BiPAP 40% 8/5;  upon arrival to SICU room- received 10 labetalol x1. POCUS showed an a-line pattern throughout with no effusions and an irregular pleural line; Repeat SBP back in 190s and he was given 20 IV labetalol x1; New R radial david placed. CK came back 11k. Sensation intact in LLE. Increased IVF from 80 --> 130/hr. At 1 AM, patient had an anxiety attack and was given 1mg IV Ativan x1 (on Klonipin 1mg @home)     Patient seen and examined at bedside. He is doing well this AM. Denies N/V/F/C, chest pain or SOB. No pain in the abdomen or extremities. He attests to flatus but no bowel movements.     Allergies    No Known Allergies    Intolerances        Vital Signs Last 24 Hrs  T(C): 37.2 (2022 05:11), Max: 37.2 (2022 05:11)  T(F): 98.9 (2022 05:11), Max: 98.9 (2022 05:11)  HR: 60 (2022 06:00) (60 - 70)  BP: 153/72 (2022 22:00) (153/72 - 202/95)  BP(mean): 106 (2022 22:00) (96 - 137)  RR: 22 (2022 06:00) (18 - 27)  SpO2: 100% (2022 06:00) (94% - 100%)    Parameters below as of 2022 06:00  Patient On (Oxygen Delivery Method): BiPAP/CPAP    O2 Concentration (%): 35     @ 07:  -  - @ 07:00  --------------------------------------------------------  IN: 1302 mL / OUT: 1070 mL / NET: 232 mL       @ 07:  -   @ 07:00  --------------------------------------------------------  IN: 1302 mL / OUT: 1070 mL / NET: 232 mL        Physical Exam:     Gen: NAD well nourished, lying comfortably in bed.  Neuro: A&OX3 No deficits. Sensation intact in b/l LE  CV: RRR, Grade 3 systolic murmur.  Pulm: CTA b/l No w/r/r  Abd: Soft NT ND + BS  Ext: Dressing on medial L LE intact w/occasional points of blood.  Vasc: L Monophasic DP/Biphasic PT. R Biphasic DP/PT  Skin: Mottled b/l LE L worse than R. Occasional bullae on R LE.  MSK: No joint swelling  Psych: No signs of anxiety or depression      LABS:  ABG - ( 2022 19:40 )  pH, Arterial: 7.39  pH, Blood: x     /  pCO2: 46    /  pO2: 120   / HCO3: 28    / Base Excess: 2.2   /  SaO2: 98.7          CBC Full  -  ( 2022 05:30 )  WBC Count : 12.87 K/uL  RBC Count : 3.45 M/uL  Hemoglobin : 11.1 g/dL  Hematocrit : 31.7 %  Platelet Count - Automated : 176 K/uL  Mean Cell Volume : 91.9 fl  Mean Cell Hemoglobin : 32.2 pg  Mean Cell Hemoglobin Concentration : 35.0 gm/dL  Auto Neutrophil # : 10.70 K/uL  Auto Lymphocyte # : 0.62 K/uL  Auto Monocyte # : 1.42 K/uL  Auto Eosinophil # : 0.04 K/uL  Auto Basophil # : 0.03 K/uL  Auto Neutrophil % : 83.2 %  Auto Lymphocyte % : 4.8 %  Auto Monocyte % : 11.0 %  Auto Eosinophil % : 0.3 %  Auto Basophil % : 0.2 %        133<L>  |  97  |  20  ----------------------------<  156<H>  4.4   |  24  |  1.10    Ca    8.7      2022 05:30  Phos  3.2     07-19  Mg     1.6     07-19      PT/INR - ( 2022 05:30 )   PT: 14.3 sec;   INR: 1.20          PTT - ( 2022 05:30 )  PTT:27.1 sec      Urinalysis Basic - ( 2022 15:09 )    Color: Yellow / Appearance: Hazy / S.015 / pH: x  Gluc: x / Ketone: NEGATIVE  / Bili: Negative / Urobili: 4.0 E.U./dL   Blood: x / Protein: NEGATIVE mg/dL / Nitrite: NEGATIVE   Leuk Esterase: NEGATIVE / RBC: < 5 /HPF / WBC 5-10 /HPF   Sq Epi: x / Non Sq Epi: 0-5 /HPF / Bacteria: None /HPF              RADIOLOGY & ADDITIONAL STUDIES (The following images were personally reviewed):          A/p: 77yMale Interval Events:   ON: Pt was transferred to SICU. He was confused and alert to name only. Pt ripped out his david. He was placed on BiPAP 40% 8/5;  upon arrival to SICU room- received 10 labetalol x1. POCUS showed an a-line pattern throughout with no effusions and an irregular pleural line; Repeat SBP back in 190s and he was given 20 IV labetalol x1; New R radial david placed. CK came back 11k. Sensation intact in LLE. Increased IVF from 80 --> 130/hr. At 1 AM, patient had an anxiety attack and was given 1mg IV Ativan x1 (on Klonipin 1mg @home)     Patient seen and examined at bedside. He is doing well this AM. Denies N/V/F/C, chest pain or SOB. No pain in the abdomen or extremities. He attests to flatus but no bowel movements.     Allergies    No Known Allergies    Intolerances        Vital Signs Last 24 Hrs  T(C): 37.2 (2022 05:11), Max: 37.2 (2022 05:11)  T(F): 98.9 (2022 05:11), Max: 98.9 (2022 05:11)  HR: 60 (2022 06:00) (60 - 70)  BP: 153/72 (2022 22:00) (153/72 - 202/95)  BP(mean): 106 (2022 22:00) (96 - 137)  RR: 22 (2022 06:00) (18 - 27)  SpO2: 100% (2022 06:00) (94% - 100%)    Parameters below as of 2022 06:00  Patient On (Oxygen Delivery Method): BiPAP/CPAP    O2 Concentration (%): 35     @ 07:  -  - @ 07:00  --------------------------------------------------------  IN: 1302 mL / OUT: 1070 mL / NET: 232 mL       @ 07:  -   @ 07:00  --------------------------------------------------------  IN: 1302 mL / OUT: 1070 mL / NET: 232 mL        Physical Exam:     Gen: NAD well nourished, lying comfortably in bed.  Neuro: A&OX3 No deficits. Sensation intact in b/l LE.  CV: RRR, Grade 3 systolic murmur.  Pulm: CTA b/l No w/r/r  Abd: Soft NT ND + BS  Ext: Dressing on medial L LE intact w/occasional points of blood.  Vasc: L Monophasic DP/Biphasic PT. R Biphasic DP/PT  Skin: Mottled b/l LE L worse than R. Occasional bullae on R LE.  MSK: No joint swelling  Psych: No signs of anxiety or depression      LABS:  ABG - ( 2022 19:40 )  pH, Arterial: 7.39  pH, Blood: x     /  pCO2: 46    /  pO2: 120   / HCO3: 28    / Base Excess: 2.2   /  SaO2: 98.7          CBC Full  -  ( 2022 05:30 )  WBC Count : 12.87 K/uL  RBC Count : 3.45 M/uL  Hemoglobin : 11.1 g/dL  Hematocrit : 31.7 %  Platelet Count - Automated : 176 K/uL  Mean Cell Volume : 91.9 fl  Mean Cell Hemoglobin : 32.2 pg  Mean Cell Hemoglobin Concentration : 35.0 gm/dL  Auto Neutrophil # : 10.70 K/uL  Auto Lymphocyte # : 0.62 K/uL  Auto Monocyte # : 1.42 K/uL  Auto Eosinophil # : 0.04 K/uL  Auto Basophil # : 0.03 K/uL  Auto Neutrophil % : 83.2 %  Auto Lymphocyte % : 4.8 %  Auto Monocyte % : 11.0 %  Auto Eosinophil % : 0.3 %  Auto Basophil % : 0.2 %        133<L>  |  97  |  20  ----------------------------<  156<H>  4.4   |  24  |  1.10    Ca    8.7      2022 05:30  Phos  3.2     07-19  Mg     1.6     07-19      PT/INR - ( 2022 05:30 )   PT: 14.3 sec;   INR: 1.20          PTT - ( 2022 05:30 )  PTT:27.1 sec      Urinalysis Basic - ( 2022 15:09 )    Color: Yellow / Appearance: Hazy / S.015 / pH: x  Gluc: x / Ketone: NEGATIVE  / Bili: Negative / Urobili: 4.0 E.U./dL   Blood: x / Protein: NEGATIVE mg/dL / Nitrite: NEGATIVE   Leuk Esterase: NEGATIVE / RBC: < 5 /HPF / WBC 5-10 /HPF   Sq Epi: x / Non Sq Epi: 0-5 /HPF / Bacteria: None /HPF              RADIOLOGY & ADDITIONAL STUDIES (The following images were personally reviewed):          A/p: 77yMale

## 2022-07-19 NOTE — PHYSICAL THERAPY INITIAL EVALUATION ADULT - THERAPY FREQUENCY, PT EVAL
Patient educated on frequency of inpatient physical therapy at [facility], patient verbalized understanding./2-3x/week

## 2022-07-19 NOTE — PROGRESS NOTE ADULT - ASSESSMENT
77M hx HTN, HLD, DM, Afib, Pacemaker, PVD, TAVR, COPD c/o left leg pain x2 days found to have an occluded popliteal stent w/ chronic limb ischemia for pre op CV evaluation    This patient is an intermediate CV risk for this moderate risk procedure.    #Pre-op:  -obtain cardiology records from Dr Trino Mac [facility] including results of recent stress test.   - can restart home losartan and nifedipine  - c/w aspirin through surgery if acceptable to surgical team  - c/w lipitor     #Afib: restart eliquis when okay from surgical standpoint  #HTN: cont current regimen of nifedipine, losartan, chlorthalidone, propranolol  #PVD/HLD:  c/w atorvastatin 40mg, vacepa, fenofibrate      To be d/w attending 77M hx HTN, HLD, DM, Afib, Pacemaker, PVD, TAVR, COPD c/o left leg pain x2 days found to have an occluded popliteal stent w/ chronic limb ischemia for pre op CV evaluation    This patient is an intermediate CV risk for this moderate risk procedure.    #Pre-op:  -obtain cardiology records from Dr Trino Mac [facility] including results of recent stress test.   - can restart home losartan and nifedipine today, would restart home chlorthalidone tomorrow if BP okay  - c/w aspirin through surgery if acceptable to surgical team  - c/w lipitor     #Afib: restart eliquis when okay from surgical standpoint  #PVD/HLD:  c/w atorvastatin 40mg, vacepa, fenofibrate      Case d/w attending  Please reconsult PRN

## 2022-07-19 NOTE — PHYSICAL THERAPY INITIAL EVALUATION ADULT - DISCHARGE DISPOSITION, PT EVAL
Home with Home PT pending increased gait assessment as pt unable to tolerate WBing on LLE 2/2 increased pain

## 2022-07-19 NOTE — PROGRESS NOTE ADULT - ASSESSMENT
77y.o M Hx HTN, HLD, DM, Pacemaker, Afib and PVD presents with LLE Critical limb ischemia. Now s/p SFA to TP trunk rGSV bypass (7/18) admitted to SICU for hemodynamic and respiratory monitoring.     Plan:  Pending 77y.o M Hx HTN, HLD, DM, Pacemaker, Afib and PVD presents with LLE Critical limb ischemia. Now s/p SFA to TP trunk rGSV bypass (7/18) admitted to SICU for hemodynamic and respiratory monitoring.     Plan:  Order PT  Start heparin @ 5cc/hr  Adv to regular diet  Rest of care per ICU 77y.o M Hx HTN, HLD, DM, Pacemaker, Afib and PVD presents with LLE Critical limb ischemia. Now s/p SFA to TP trunk rGSV bypass (7/18) admitted to SICU for hemodynamic and respiratory monitoring.     Plan:  Order PT  Start heparin @ 5cc/hr  Remove blas and TOV  Adv to regular diet  Rest of care per ICU

## 2022-07-19 NOTE — PHYSICAL THERAPY INITIAL EVALUATION ADULT - GENERAL OBSERVATIONS, REHAB EVAL
PT IE completed. Chart reviewed. Pt received semi-supine, NAD, +tele, +R A line, +L IV, +LLE wound dressing C/D/I, +RA. ESTELLE Bah cleared pt for PT. MD Hackett (Team 3) cleared pt for PT.

## 2022-07-19 NOTE — PHYSICAL THERAPY INITIAL EVALUATION ADULT - PERTINENT HX OF CURRENT PROBLEM, REHAB EVAL
77y.o M Hx HTN, HLD, DM, Pacemaker, Afib and PVD presents with LLE Critical limb ischemia. Now s/p SFA to TP trunk rGSV bypass (7/18) admitted to SICU for hemodynamic and respiratory monitoring.

## 2022-07-19 NOTE — PHYSICAL THERAPY INITIAL EVALUATION ADULT - TRANSFER SAFETY CONCERNS NOTED: SIT/STAND, REHAB EVAL
pt unable to tolerate standing position, as pt began WBing through LLE, experienced increased LLE pain and lifted L foot off floor, unable to tolerate further LLE WBing/decreased step length/decreased weight-shifting ability

## 2022-07-19 NOTE — PROGRESS NOTE ADULT - SUBJECTIVE AND OBJECTIVE BOX
24 hr events: Transferred to SICU, confused, alert to name only, ripped out david- STAT ABG, placd on BiPAP 40% 8/5;  upon arrival to SICU room- 10 IV labetalol x1; CXR completed, POCUS a-line pattern throughout no effusions, irregular pleural line; Repeat SBP back in 190s- 20 IV labetalol x1; New R radial david placed; CK 11k, sensation intact in LLE- increased IVF from 80 --> 130/hr; Anxiety attack @1AM- 1mg IV Ativan x1 (on Klonipin 1mg @home)     SUBJECTIVE: Patient seen at bedside in no acute distress. Complaining of some incisional tenderness. No CP, SOB, fevers or chills.     Vital Signs Last 24 Hrs  T(C): 37.2 (2022 05:11), Max: 37.2 (2022 05:11)  T(F): 98.9 (2022 05:11), Max: 98.9 (2022 05:11)  HR: 60 (2022 06:00) (60 - 70)  BP: 153/72 (2022 22:00) (153/72 - 202/)  BP(mean): 106 (2022 22:00) (96 - 137)  RR: 22 (2022 06:00) (18 - 27)  SpO2: 100% (2022 06:00) (94% - 100%)    Parameters below as of 2022 06:00  Patient On (Oxygen Delivery Method): BiPAP/CPAP    O2 Concentration (%): 35    PHYSICAL EXAM:  Physical Exam  General: NAD, resting comfortably in bed  Pulm: Nonlabored breathing, no respiratory distress sat well on NC, BiPAP at bedside   Abd: soft, non distended no rebound or guarding  Extrem: LLE bypass dressing in place, moderate saturation sup>inf, leg with moderate mottling, cool to touch  Pulses: L: Biphasic DP + mono/biphasic PT,   Neuro: A/O x 3, sensation diminished in LLE but improving  Lines/drains/tubes:    I&O's Summary    2022 07:01  -  2022 07:00  --------------------------------------------------------  IN: 342 mL / OUT: 1500 mL / NET: -1158 mL    2022 07:01  -  2022 06:59  --------------------------------------------------------  IN: 1302 mL / OUT: 1070 mL / NET: 232 mL        LABS:                        11.1   12.87 )-----------( 176      ( 2022 05:30 )             31.7     07-19    133<L>  |  97  |  20  ----------------------------<  156<H>  4.4   |  24  |  1.10    Ca    8.7      2022 05:30  Phos  3.2     07-19  Mg     1.6     07-19      PT/INR - ( 2022 05:30 )   PT: 14.3 sec;   INR: 1.20          PTT - ( 2022 05:30 )  PTT:27.1 sec  Urinalysis Basic - ( 2022 15:09 )    Color: Yellow / Appearance: Hazy / S.015 / pH: x  Gluc: x / Ketone: NEGATIVE  / Bili: Negative / Urobili: 4.0 E.U./dL   Blood: x / Protein: NEGATIVE mg/dL / Nitrite: NEGATIVE   Leuk Esterase: NEGATIVE / RBC: < 5 /HPF / WBC 5-10 /HPF   Sq Epi: x / Non Sq Epi: 0-5 /HPF / Bacteria: None /HPF      CAPILLARY BLOOD GLUCOSE      POCT Blood Glucose.: 147 mg/dL (2022 06:37)  POCT Blood Glucose.: 177 mg/dL (2022 19:17)        RADIOLOGY & ADDITIONAL STUDIES:

## 2022-07-19 NOTE — PROGRESS NOTE ADULT - ASSESSMENT
77y.o M Hx HTN, HLD, DM, Pacemaker, Afib and PVD presents with LLE Critical limb ischemia. Now s/p SFA to TP trunk rGSV bypass (7/18) admitted to SICU for hemodynamic and respiratory monitoring.     Neuro: Pain control, tylenol PRN, oxycodone PRN, zofran PRN  CV: MAP >65, ASA restart 7/19 AM, NS @80/hr; HTN- 5 IV Metop q6h while on BiPAP holding: chlorthalidone, nefidipine, losartan, propranolol. HLD: restart atorvastatin, fenofibrate Afib: holding Eliquis, plan for restart Hep gtt at 10pm pending aPTT  Vasc: s/p LLE SFA to TP trunk bypass with ipsilateral rGSV (, IVF 2000, 500 albumin, . S/p LLE angiogram with TPA (7/15)  Pulm: COPD: continue Duoneb q6, BiLevel qHS  GI: CLD  : Godfrey  ID: Ancef x3  Endo: mISS  PPx: Heparin gtt/Hold SCD 2/2 PAD   Lines: Right rad david (7/18--), PIVs  Wounds: LLE bypass dressing in place  PT/OT: Not ordered  Dispo: SICU   77y.o M Hx HTN, HLD, DM, Pacemaker, Afib and PVD presents with LLE Critical limb ischemia. Now s/p SFA to TP trunk rGSV bypass (7/18) admitted to SICU for hemodynamic and respiratory monitoring.     Neuro: Pain control, tylenol PRN, oxycodone PRN, zofran PRN  CV: MAP >65, ASA restart 7/19 AM, NS increased to 250cc/hr; HTN- 5 IV Metop q6h while on BiPAP holding: chlorthalidone, nefidipine, losartan, propranolol, and fenofibrate. HLD: restart atorvastatin. Afib: holding Eliquis, Hep gtt @5cc/hr  Vasc: s/p LLE SFA to TP trunk bypass with ipsilateral rGSV (, IVF 2000, 500 albumin, . S/p LLE angiogram with TPA (7/15)  Pulm: COPD: continue Duoneb q6, BiLevel qHS  GI: Carb Controlled Kosher  : [place]. Monitor CK and UOP (RPT @1600)  ID: Ancef x3  Endo: mISS  PPx: Heparin gtt/Hold SCD 2/2 PAD   Lines: Right rad david (7/18--), PIVs  Wounds: LLE bypass dressing in place  PT/OT: Not ordered  Dispo: SICU   77y.o M Hx HTN, HLD, DM, Pacemaker, Afib and PVD presents with LLE Critical limb ischemia. Now s/p SFA to TP trunk rGSV bypass (7/18) admitted to SICU for hemodynamic and respiratory monitoring.     Neuro: Pain control, tylenol PRN, oxycodone PRN, zofran PRN  CV: MAP >65, ASA restart 7/19 AM, NS increased to 250cc/hr; HTN- 5 IV Metop q6h while on BiPAP holding: chlorthalidone, nefidipine, losartan, propranolol, and fenofibrate. HLD: restart atorvastatin. Afib: holding Eliquis, Hep gtt @5cc/hr  Vasc: s/p LLE SFA to TP trunk bypass with ipsilateral rGSV (, IVF 2000, 500 albumin, . S/p LLE angiogram with TPA (7/15)  Pulm: COPD: continue Duoneb q6, BiLevel qHS  GI: Carb [place]  : [place] [address]. Monitor CK and UOP (RPT @1600)  ID: Ancef x3  Endo: mISS  PPx: Heparin gtt/Hold SCD 2/2 PAD   Lines: Right rad david (7/18--), PIVs  Wounds: LLE bypass dressing in place  PT/OT: Not ordered  Dispo: SICU

## 2022-07-19 NOTE — PROGRESS NOTE ADULT - SUBJECTIVE AND OBJECTIVE BOX
INTERVAL EVENTS:    PAST MEDICAL & SURGICAL HISTORY:  HTN (hypertension)    High cholesterol    DM (diabetes mellitus)    Popliteal aneurysm    Pacemaker    S/P TAVR (transcatheter aortic valve replacement)        MEDICATIONS  (STANDING):  ascorbic acid 500 milliGRAM(s) Oral daily  atorvastatin 40 milliGRAM(s) Oral at bedtime  carBAMazepine 200 milliGRAM(s) Oral two times a day  ceFAZolin   IVPB 2000 milliGRAM(s) IV Intermittent every 8 hours  chlorhexidine 2% Cloths 1 Application(s) Topical <User Schedule>  dextrose 5%. 1000 milliLiter(s) (50 mL/Hr) IV Continuous <Continuous>  dextrose 5%. 1000 milliLiter(s) (100 mL/Hr) IV Continuous <Continuous>  dextrose 5%. 1000 milliLiter(s) (100 mL/Hr) IV Continuous <Continuous>  dextrose 5%. 1000 milliLiter(s) (50 mL/Hr) IV Continuous <Continuous>  dextrose 50% Injectable 25 Gram(s) IV Push once  dextrose 50% Injectable 12.5 Gram(s) IV Push once  dextrose 50% Injectable 25 Gram(s) IV Push once  dextrose 50% Injectable 25 Gram(s) IV Push once  dextrose 50% Injectable 12.5 Gram(s) IV Push once  dextrose 50% Injectable 25 Gram(s) IV Push once  fenofibrate Tablet 145 milliGRAM(s) Oral daily  glucagon  Injectable 1 milliGRAM(s) IntraMuscular once  glucagon  Injectable 1 milliGRAM(s) IntraMuscular once  insulin lispro (ADMELOG) corrective regimen sliding scale   SubCutaneous Before meals and at bedtime  metoprolol tartrate Injectable 5 milliGRAM(s) IV Push every 6 hours  multivitamin 1 Tablet(s) Oral daily  pantoprazole    Tablet 40 milliGRAM(s) Oral before breakfast  sodium chloride 0.9%. 1000 milliLiter(s) (130 mL/Hr) IV Continuous <Continuous>  tamsulosin 0.4 milliGRAM(s) Oral at bedtime  venlafaxine 100 milliGRAM(s) Oral two times a day with meals    MEDICATIONS  (PRN):  dextrose Oral Gel 15 Gram(s) Oral once PRN Blood Glucose LESS THAN 70 milliGRAM(s)/deciliter  dextrose Oral Gel 15 Gram(s) Oral once PRN Blood Glucose LESS THAN 70 milliGRAM(s)/deciliter    T(F): 98.9 (07-19-22 @ 05:11), Max: 98.9 (07-19-22 @ 05:11)  HR: 60 (07-19-22 @ 06:00) (60 - 70)  BP: 153/72 (07-18-22 @ 22:00) (153/72 - 202/95)  BP(mean): 106 (07-18-22 @ 22:00) (96 - 137)  ABP: 174/52 (07-19-22 @ 06:00) (149/61 - 174/52)  ABP(mean): 91 (07-19-22 @ 06:00) (86 - 106)  RR: 22 (07-19-22 @ 06:00) (18 - 27)  SpO2: 100% (07-19-22 @ 06:00) (94% - 100%)    I/O Detail [facility]    18 Jul 2022 07:01  -  19 Jul 2022 07:00  --------------------------------------------------------  IN:    Heparin: 32 mL    IV PiggyBack: 150 mL    sodium chloride 0.9%: 1120 mL  Total IN: 1302 mL    OUT:    Indwelling Catheter - Urethral (mL): 1070 mL    Oral Fluid: 0 mL  Total OUT: 1070 mL    Total NET: 232 mL          PHYSICAL EXAM:  GEN: NAD  HEENT: EOMI   RESP: CTA b/l  CV: RRR. Normal S1/S2. No m/r/g.  ABD: soft, non-distended  EXT: No edema   NEURO: alert and attentive    LABS:  CBC 07-19-22 @ 05:30                        11.1   12.87 )-----------( 176                   31.7       Hgb trend: 11.1 <-- , 11.5 <-- , 13.0 <-- , 12.5 <--   WBC trend: 12.87 <-- , 10.19 <-- , 7.81 <-- , 7.40 <--       CMP 07-19-22 @ 05:30    133<L>  |  97  |  20  ----------------------------<  156<H>  4.4   |  24  |  1.10    Ca    8.7      07-19-22 @ 05:30  Phos  3.2     07-19  Mg     1.6     07-19        Serum Cr trend: 1.10 <-- , 1.03 <-- , 1.03 <-- , 1.18 <--     PT/INR - ( 19 Jul 2022 05:30 )   PT: 14.3 sec;   INR: 1.20          PTT - ( 19 Jul 2022 05:30 ):27.1 sec    Cardiac Markers   07-19-22 @ 05:30: HS Trop x     / [place] 95707<HH> / CKMB x      07-18-22 @ 18:25: HS Trop x     / CK 26931<HH> / CKMB x              STUDIES:           INTERVAL EVENTS: s/p LLE femoral pop bypass yesterday, hypertensive overnight requiring bipap    PAST MEDICAL & SURGICAL HISTORY:  HTN (hypertension)    High cholesterol    DM (diabetes mellitus)    Popliteal aneurysm    Pacemaker    S/P TAVR (transcatheter aortic valve replacement)        MEDICATIONS  (STANDING):  ascorbic acid 500 milliGRAM(s) Oral daily  atorvastatin 40 milliGRAM(s) Oral at bedtime  carBAMazepine 200 milliGRAM(s) Oral two times a day  ceFAZolin   IVPB 2000 milliGRAM(s) IV Intermittent every 8 hours  chlorhexidine 2% Cloths 1 Application(s) Topical <User Schedule>  dextrose 5%. 1000 milliLiter(s) (50 mL/Hr) IV Continuous <Continuous>  dextrose 5%. 1000 milliLiter(s) (100 mL/Hr) IV Continuous <Continuous>  dextrose 5%. 1000 milliLiter(s) (100 mL/Hr) IV Continuous <Continuous>  dextrose 5%. 1000 milliLiter(s) (50 mL/Hr) IV Continuous <Continuous>  dextrose 50% Injectable 25 Gram(s) IV Push once  dextrose 50% Injectable 12.5 Gram(s) IV Push once  dextrose 50% Injectable 25 Gram(s) IV Push once  dextrose 50% Injectable 25 Gram(s) IV Push once  dextrose 50% Injectable 12.5 Gram(s) IV Push once  dextrose 50% Injectable 25 Gram(s) IV Push once  fenofibrate Tablet 145 milliGRAM(s) Oral daily  glucagon  Injectable 1 milliGRAM(s) IntraMuscular once  glucagon  Injectable 1 milliGRAM(s) IntraMuscular once  insulin lispro (ADMELOG) corrective regimen sliding scale   SubCutaneous Before meals and at bedtime  metoprolol tartrate Injectable 5 milliGRAM(s) IV Push every 6 hours  multivitamin 1 Tablet(s) Oral daily  pantoprazole    Tablet 40 milliGRAM(s) Oral before breakfast  sodium chloride 0.9%. 1000 milliLiter(s) (130 mL/Hr) IV Continuous <Continuous>  tamsulosin 0.4 milliGRAM(s) Oral at bedtime  venlafaxine 100 milliGRAM(s) Oral two times a day with meals    MEDICATIONS  (PRN):  dextrose Oral Gel 15 Gram(s) Oral once PRN Blood Glucose LESS THAN 70 milliGRAM(s)/deciliter  dextrose Oral Gel 15 Gram(s) Oral once PRN Blood Glucose LESS THAN 70 milliGRAM(s)/deciliter    T(F): 98.9 (07-19-22 @ 05:11), Max: 98.9 (07-19-22 @ 05:11)  HR: 60 (07-19-22 @ 06:00) (60 - 70)  BP: 153/72 (07-18-22 @ 22:00) (153/72 - 202/95)  BP(mean): 106 (07-18-22 @ 22:00) (96 - 137)  ABP: 174/52 (07-19-22 @ 06:00) (149/61 - 174/52)  ABP(mean): 91 (07-19-22 @ 06:00) (86 - 106)  RR: 22 (07-19-22 @ 06:00) (18 - 27)  SpO2: 100% (07-19-22 @ 06:00) (94% - 100%)    I/O Detail 24H    18 Jul 2022 07:01  -  19 Jul 2022 07:00  --------------------------------------------------------  IN:    Heparin: 32 mL    IV PiggyBack: 150 mL    sodium chloride 0.9%: 1120 mL  Total IN: 1302 mL    OUT:    Indwelling Catheter - Urethral (mL): 1070 mL    Oral Fluid: 0 mL  Total OUT: 1070 mL    Total NET: 232 mL          PHYSICAL EXAM:  GEN: NAD  HEENT: EOMI   RESP: CTA b/l  CV: RRR. Normal S1/S2. No m/r/g.  ABD: soft, non-distended  EXT: No edema   NEURO: alert and attentive    LABS:  CBC 07-19-22 @ 05:30                        11.1   12.87 )-----------( 176                   31.7       Hgb trend: 11.1 <-- , 11.5 <-- , 13.0 <-- , 12.5 <--   WBC trend: 12.87 <-- , 10.19 <-- , 7.81 <-- , 7.40 <--       CMP 07-19-22 @ 05:30    133<L>  |  97  |  20  ----------------------------<  156<H>  4.4   |  24  |  1.10    Ca    8.7      07-19-22 @ 05:30  Phos  3.2     07-19  Mg     1.6     07-19        Serum Cr trend: 1.10 <-- , 1.03 <-- , 1.03 <-- , 1.18 <--     PT/INR - ( 19 Jul 2022 05:30 )   PT: 14.3 sec;   INR: 1.20          PTT - ( 19 Jul 2022 05:30 ):27.1 sec    Cardiac Markers   07-19-22 @ 05:30: HS Trop x     / [place] [address]<HH> / CKMB x      07-18-22 @ 18:25: HS Trop x     / [place] 484[address]<HH> / CKMB x              STUDIES:           INTERVAL EVENTS: s/p LLE femoral pop bypass yesterday, hypertensive overnight requiring bipap    PAST MEDICAL & SURGICAL HISTORY:  HTN (hypertension)    High cholesterol    DM (diabetes mellitus)    Popliteal aneurysm    Pacemaker    S/P TAVR (transcatheter aortic valve replacement)        MEDICATIONS  (STANDING):  ascorbic acid 500 milliGRAM(s) Oral daily  atorvastatin 40 milliGRAM(s) Oral at bedtime  carBAMazepine 200 milliGRAM(s) Oral two times a day  ceFAZolin   IVPB 2000 milliGRAM(s) IV Intermittent every 8 hours  chlorhexidine 2% Cloths 1 Application(s) Topical <User Schedule>  dextrose 5%. 1000 milliLiter(s) (50 mL/Hr) IV Continuous <Continuous>  dextrose 5%. 1000 milliLiter(s) (100 mL/Hr) IV Continuous <Continuous>  dextrose 5%. 1000 milliLiter(s) (100 mL/Hr) IV Continuous <Continuous>  dextrose 5%. 1000 milliLiter(s) (50 mL/Hr) IV Continuous <Continuous>  dextrose 50% Injectable 25 Gram(s) IV Push once  dextrose 50% Injectable 12.5 Gram(s) IV Push once  dextrose 50% Injectable 25 Gram(s) IV Push once  dextrose 50% Injectable 25 Gram(s) IV Push once  dextrose 50% Injectable 12.5 Gram(s) IV Push once  dextrose 50% Injectable 25 Gram(s) IV Push once  fenofibrate Tablet 145 milliGRAM(s) Oral daily  glucagon  Injectable 1 milliGRAM(s) IntraMuscular once  glucagon  Injectable 1 milliGRAM(s) IntraMuscular once  insulin lispro (ADMELOG) corrective regimen sliding scale   SubCutaneous Before meals and at bedtime  metoprolol tartrate Injectable 5 milliGRAM(s) IV Push every 6 hours  multivitamin 1 Tablet(s) Oral daily  pantoprazole    Tablet 40 milliGRAM(s) Oral before breakfast  sodium chloride 0.9%. 1000 milliLiter(s) (130 mL/Hr) IV Continuous <Continuous>  tamsulosin 0.4 milliGRAM(s) Oral at bedtime  venlafaxine 100 milliGRAM(s) Oral two times a day with meals    MEDICATIONS  (PRN):  dextrose Oral Gel 15 Gram(s) Oral once PRN Blood Glucose LESS THAN 70 milliGRAM(s)/deciliter  dextrose Oral Gel 15 Gram(s) Oral once PRN Blood Glucose LESS THAN 70 milliGRAM(s)/deciliter    T(F): 98.9 (07-19-22 @ 05:11), Max: 98.9 (07-19-22 @ 05:11)  HR: 60 (07-19-22 @ 06:00) (60 - 70)  BP: 153/72 (07-18-22 @ 22:00) (153/72 - 202/95)  BP(mean): 106 (07-18-22 @ 22:00) (96 - 137)  ABP: 174/52 (07-19-22 @ 06:00) (149/61 - 174/52)  ABP(mean): 91 (07-19-22 @ 06:00) (86 - 106)  RR: 22 (07-19-22 @ 06:00) (18 - 27)  SpO2: 100% (07-19-22 @ 06:00) (94% - 100%)    I/O Detail 24H    18 Jul 2022 07:01  -  19 Jul 2022 07:00  --------------------------------------------------------  IN:    Heparin: 32 mL    IV PiggyBack: 150 mL    sodium chloride 0.9%: 1120 mL  Total IN: 1302 mL    OUT:    Indwelling Catheter - Urethral (mL): 1070 mL    Oral Fluid: 0 mL  Total OUT: 1070 mL    Total NET: 232 mL          PHYSICAL EXAM:    GEN: Awake, comfortable. NAD.   HEENT: No JVD.   RESP: CTA b/l  CV: RRR, normal s1/s2. ejection systolic murmur  ABD: Soft, NTND. BS+  EXT: Warm. No edema, bilateral mottling of LE   NEURO: AAOx3.  LABS:  CBC 07-19-22 @ 05:30                        11.1   12.87 )-----------( 176                   31.7       Hgb trend: 11.1 <-- , 11.5 <-- , 13.0 <-- , 12.5 <--   WBC trend: 12.87 <-- , 10.19 <-- , 7.81 <-- , 7.40 <--       CMP 07-19-22 @ 05:30    133<L>  |  97  |  20  ----------------------------<  156<H>  4.4   |  24  |  1.10    Ca    8.7      07-19-22 @ 05:30  Phos  3.2     07-19  Mg     1.6     07-19        Serum Cr trend: 1.10 <-- , 1.03 <-- , 1.03 <-- , 1.18 <--     PT/INR - ( 19 Jul 2022 05:30 )   PT: 14.3 sec;   INR: 1.20          PTT - ( 19 Jul 2022 05:30 ):27.1 sec    Cardiac Markers   07-19-22 @ 05:30: HS Trop x     / [place] [address]<HH> / CKMB x      07-18-22 @ 18:25: HS Trop [place]     / [place] [address]<HH> / CKMB x              STUDIES:

## 2022-07-19 NOTE — PHYSICAL THERAPY INITIAL EVALUATION ADULT - ADDITIONAL COMMENTS
Pt reports living with wife in house which is in senior living community. Reports 1 CHRISTINE and no stairs inside. Reports using rolling walker to ambulate. Reports being independent with daily activities.

## 2022-07-20 LAB
ANION GAP SERPL CALC-SCNC: 11 MMOL/L — SIGNIFICANT CHANGE UP (ref 5–17)
APPEARANCE UR: CLEAR — SIGNIFICANT CHANGE UP
APTT BLD: 151 SEC — CRITICAL HIGH (ref 27.5–35.5)
APTT BLD: 33.8 SEC — SIGNIFICANT CHANGE UP (ref 27.5–35.5)
APTT BLD: 35.2 SEC — SIGNIFICANT CHANGE UP (ref 27.5–35.5)
APTT BLD: >200 SEC — CRITICAL HIGH (ref 27.5–35.5)
BACTERIA # UR AUTO: PRESENT /HPF
BILIRUB UR-MCNC: NEGATIVE — SIGNIFICANT CHANGE UP
BUN SERPL-MCNC: 18 MG/DL — SIGNIFICANT CHANGE UP (ref 7–23)
CALCIUM SERPL-MCNC: 8.6 MG/DL — SIGNIFICANT CHANGE UP (ref 8.4–10.5)
CHLORIDE SERPL-SCNC: 101 MMOL/L — SIGNIFICANT CHANGE UP (ref 96–108)
CK SERPL-CCNC: CRITICAL HIGH U/L (ref 30–200)
CK SERPL-CCNC: CRITICAL HIGH U/L (ref 30–200)
CO2 SERPL-SCNC: 22 MMOL/L — SIGNIFICANT CHANGE UP (ref 22–31)
COLOR SPEC: YELLOW — SIGNIFICANT CHANGE UP
CREAT SERPL-MCNC: 1.03 MG/DL — SIGNIFICANT CHANGE UP (ref 0.5–1.3)
DIFF PNL FLD: ABNORMAL
EGFR: 74 ML/MIN/1.73M2 — SIGNIFICANT CHANGE UP
EPI CELLS # UR: SIGNIFICANT CHANGE UP /HPF (ref 0–5)
GLUCOSE BLDC GLUCOMTR-MCNC: 131 MG/DL — HIGH (ref 70–99)
GLUCOSE BLDC GLUCOMTR-MCNC: 153 MG/DL — HIGH (ref 70–99)
GLUCOSE BLDC GLUCOMTR-MCNC: 176 MG/DL — HIGH (ref 70–99)
GLUCOSE SERPL-MCNC: 190 MG/DL — HIGH (ref 70–99)
GLUCOSE UR QL: NEGATIVE — SIGNIFICANT CHANGE UP
HCT VFR BLD CALC: 27.3 % — LOW (ref 39–50)
HGB BLD-MCNC: 9.3 G/DL — LOW (ref 13–17)
KETONES UR-MCNC: NEGATIVE — SIGNIFICANT CHANGE UP
LEUKOCYTE ESTERASE UR-ACNC: NEGATIVE — SIGNIFICANT CHANGE UP
MAGNESIUM SERPL-MCNC: 2 MG/DL — SIGNIFICANT CHANGE UP (ref 1.6–2.6)
MCHC RBC-ENTMCNC: 31.5 PG — SIGNIFICANT CHANGE UP (ref 27–34)
MCHC RBC-ENTMCNC: 34.1 GM/DL — SIGNIFICANT CHANGE UP (ref 32–36)
MCV RBC AUTO: 92.5 FL — SIGNIFICANT CHANGE UP (ref 80–100)
NITRITE UR-MCNC: NEGATIVE — SIGNIFICANT CHANGE UP
NRBC # BLD: 0 /100 WBCS — SIGNIFICANT CHANGE UP (ref 0–0)
PH UR: 5.5 — SIGNIFICANT CHANGE UP (ref 5–8)
PHOSPHATE SERPL-MCNC: 1.8 MG/DL — LOW (ref 2.5–4.5)
PLATELET # BLD AUTO: 157 K/UL — SIGNIFICANT CHANGE UP (ref 150–400)
POTASSIUM SERPL-MCNC: 3.8 MMOL/L — SIGNIFICANT CHANGE UP (ref 3.5–5.3)
POTASSIUM SERPL-SCNC: 3.8 MMOL/L — SIGNIFICANT CHANGE UP (ref 3.5–5.3)
PROT UR-MCNC: NEGATIVE MG/DL — SIGNIFICANT CHANGE UP
RBC # BLD: 2.95 M/UL — LOW (ref 4.2–5.8)
RBC # FLD: 13.4 % — SIGNIFICANT CHANGE UP (ref 10.3–14.5)
RBC CASTS # UR COMP ASSIST: > 10 /HPF
SODIUM SERPL-SCNC: 134 MMOL/L — LOW (ref 135–145)
SP GR SPEC: 1.02 — SIGNIFICANT CHANGE UP (ref 1–1.03)
UROBILINOGEN FLD QL: 1 E.U./DL — SIGNIFICANT CHANGE UP
WBC # BLD: 10.19 K/UL — SIGNIFICANT CHANGE UP (ref 3.8–10.5)
WBC # FLD AUTO: 10.19 K/UL — SIGNIFICANT CHANGE UP (ref 3.8–10.5)
WBC UR QL: ABNORMAL /HPF

## 2022-07-20 PROCEDURE — 71045 X-RAY EXAM CHEST 1 VIEW: CPT | Mod: 26

## 2022-07-20 PROCEDURE — 71045 X-RAY EXAM CHEST 1 VIEW: CPT | Mod: 26,77

## 2022-07-20 PROCEDURE — 99232 SBSQ HOSP IP/OBS MODERATE 35: CPT | Mod: GC

## 2022-07-20 PROCEDURE — 99291 CRITICAL CARE FIRST HOUR: CPT

## 2022-07-20 RX ORDER — POTASSIUM PHOSPHATE, MONOBASIC POTASSIUM PHOSPHATE, DIBASIC 236; 224 MG/ML; MG/ML
21 INJECTION, SOLUTION INTRAVENOUS ONCE
Refills: 0 | Status: COMPLETED | OUTPATIENT
Start: 2022-07-20 | End: 2022-07-20

## 2022-07-20 RX ORDER — IPRATROPIUM/ALBUTEROL SULFATE 18-103MCG
3 AEROSOL WITH ADAPTER (GRAM) INHALATION EVERY 4 HOURS
Refills: 0 | Status: DISCONTINUED | OUTPATIENT
Start: 2022-07-20 | End: 2022-07-23

## 2022-07-20 RX ORDER — PROPOFOL 10 MG/ML
9.8 INJECTION, EMULSION INTRAVENOUS
Qty: 1000 | Refills: 0 | Status: DISCONTINUED | OUTPATIENT
Start: 2022-07-20 | End: 2022-07-20

## 2022-07-20 RX ORDER — PROPOFOL 10 MG/ML
14 INJECTION, EMULSION INTRAVENOUS ONCE
Refills: 0 | Status: COMPLETED | OUTPATIENT
Start: 2022-07-20 | End: 2022-07-20

## 2022-07-20 RX ORDER — FUROSEMIDE 40 MG
20 TABLET ORAL ONCE
Refills: 0 | Status: COMPLETED | OUTPATIENT
Start: 2022-07-20 | End: 2022-07-20

## 2022-07-20 RX ORDER — ACETAMINOPHEN 500 MG
1000 TABLET ORAL ONCE
Refills: 0 | Status: COMPLETED | OUTPATIENT
Start: 2022-07-20 | End: 2022-07-20

## 2022-07-20 RX ORDER — CHLORHEXIDINE GLUCONATE 213 G/1000ML
15 SOLUTION TOPICAL EVERY 12 HOURS
Refills: 0 | Status: DISCONTINUED | OUTPATIENT
Start: 2022-07-20 | End: 2022-07-22

## 2022-07-20 RX ORDER — CLONAZEPAM 1 MG
1 TABLET ORAL DAILY
Refills: 0 | Status: DISCONTINUED | OUTPATIENT
Start: 2022-07-20 | End: 2022-07-20

## 2022-07-20 RX ORDER — ACETAMINOPHEN 500 MG
650 TABLET ORAL EVERY 6 HOURS
Refills: 0 | Status: DISCONTINUED | OUTPATIENT
Start: 2022-07-20 | End: 2022-07-20

## 2022-07-20 RX ORDER — PANTOPRAZOLE SODIUM 20 MG/1
40 TABLET, DELAYED RELEASE ORAL DAILY
Refills: 0 | Status: DISCONTINUED | OUTPATIENT
Start: 2022-07-20 | End: 2022-07-23

## 2022-07-20 RX ORDER — ALBUTEROL 90 UG/1
2 AEROSOL, METERED ORAL EVERY 6 HOURS
Refills: 0 | Status: DISCONTINUED | OUTPATIENT
Start: 2022-07-20 | End: 2022-07-20

## 2022-07-20 RX ORDER — SODIUM,POTASSIUM PHOSPHATES 278-250MG
1 POWDER IN PACKET (EA) ORAL ONCE
Refills: 0 | Status: COMPLETED | OUTPATIENT
Start: 2022-07-20 | End: 2022-07-20

## 2022-07-20 RX ORDER — PROPOFOL 10 MG/ML
9.8 INJECTION, EMULSION INTRAVENOUS
Qty: 1000 | Refills: 0 | Status: DISCONTINUED | OUTPATIENT
Start: 2022-07-20 | End: 2022-07-21

## 2022-07-20 RX ORDER — IPRATROPIUM/ALBUTEROL SULFATE 18-103MCG
3 AEROSOL WITH ADAPTER (GRAM) INHALATION EVERY 6 HOURS
Refills: 0 | Status: DISCONTINUED | OUTPATIENT
Start: 2022-07-20 | End: 2022-07-20

## 2022-07-20 RX ORDER — SODIUM CHLORIDE 9 MG/ML
1000 INJECTION INTRAMUSCULAR; INTRAVENOUS; SUBCUTANEOUS
Refills: 0 | Status: DISCONTINUED | OUTPATIENT
Start: 2022-07-20 | End: 2022-07-20

## 2022-07-20 RX ORDER — LANOLIN ALCOHOL/MO/W.PET/CERES
5 CREAM (GRAM) TOPICAL AT BEDTIME
Refills: 0 | Status: DISCONTINUED | OUTPATIENT
Start: 2022-07-20 | End: 2022-07-20

## 2022-07-20 RX ORDER — TRAZODONE HCL 50 MG
300 TABLET ORAL EVERY 24 HOURS
Refills: 0 | Status: DISCONTINUED | OUTPATIENT
Start: 2022-07-20 | End: 2022-07-21

## 2022-07-20 RX ORDER — DEXMEDETOMIDINE HYDROCHLORIDE IN 0.9% SODIUM CHLORIDE 4 UG/ML
0.2 INJECTION INTRAVENOUS
Qty: 200 | Refills: 0 | Status: DISCONTINUED | OUTPATIENT
Start: 2022-07-20 | End: 2022-07-20

## 2022-07-20 RX ORDER — DEXMEDETOMIDINE HYDROCHLORIDE IN 0.9% SODIUM CHLORIDE 4 UG/ML
0.2 INJECTION INTRAVENOUS
Qty: 200 | Refills: 0 | Status: DISCONTINUED | OUTPATIENT
Start: 2022-07-20 | End: 2022-07-22

## 2022-07-20 RX ORDER — FUROSEMIDE 40 MG
40 TABLET ORAL ONCE
Refills: 0 | Status: COMPLETED | OUTPATIENT
Start: 2022-07-20 | End: 2022-07-20

## 2022-07-20 RX ORDER — FENTANYL CITRATE 50 UG/ML
100 INJECTION INTRAVENOUS ONCE
Refills: 0 | Status: DISCONTINUED | OUTPATIENT
Start: 2022-07-20 | End: 2022-07-20

## 2022-07-20 RX ADMIN — Medication 5 MILLIGRAM(S): at 06:55

## 2022-07-20 RX ADMIN — ATORVASTATIN CALCIUM 40 MILLIGRAM(S): 80 TABLET, FILM COATED ORAL at 23:30

## 2022-07-20 RX ADMIN — Medication 2: at 07:33

## 2022-07-20 RX ADMIN — Medication 400 MILLIGRAM(S): at 23:00

## 2022-07-20 RX ADMIN — POTASSIUM PHOSPHATE, MONOBASIC POTASSIUM PHOSPHATE, DIBASIC 62.5 MILLIMOLE(S): 236; 224 INJECTION, SOLUTION INTRAVENOUS at 10:10

## 2022-07-20 RX ADMIN — FENTANYL CITRATE 100 MICROGRAM(S): 50 INJECTION INTRAVENOUS at 20:34

## 2022-07-20 RX ADMIN — PANTOPRAZOLE SODIUM 40 MILLIGRAM(S): 20 TABLET, DELAYED RELEASE ORAL at 06:55

## 2022-07-20 RX ADMIN — Medication 5 MILLIGRAM(S): at 12:00

## 2022-07-20 RX ADMIN — Medication 37.5 MILLIGRAM(S): at 06:55

## 2022-07-20 RX ADMIN — Medication 400 MILLIGRAM(S): at 06:55

## 2022-07-20 RX ADMIN — Medication 1000 MILLIGRAM(S): at 07:41

## 2022-07-20 RX ADMIN — Medication 60 MILLIGRAM(S): at 20:45

## 2022-07-20 RX ADMIN — PROPOFOL 14 MILLIGRAM(S): 10 INJECTION, EMULSION INTRAVENOUS at 19:35

## 2022-07-20 RX ADMIN — Medication 3 MILLILITER(S): at 23:49

## 2022-07-20 RX ADMIN — Medication 81 MILLIGRAM(S): at 12:30

## 2022-07-20 RX ADMIN — Medication 2: at 16:23

## 2022-07-20 RX ADMIN — Medication 3 MILLILITER(S): at 20:03

## 2022-07-20 RX ADMIN — Medication 1 MILLIGRAM(S): at 16:38

## 2022-07-20 RX ADMIN — Medication 500 MILLIGRAM(S): at 16:27

## 2022-07-20 RX ADMIN — Medication 1 PACKET(S): at 10:11

## 2022-07-20 RX ADMIN — Medication 5 MILLIGRAM(S): at 02:50

## 2022-07-20 RX ADMIN — HEPARIN SODIUM 14 UNIT(S)/HR: 5000 INJECTION INTRAVENOUS; SUBCUTANEOUS at 16:23

## 2022-07-20 RX ADMIN — DEXMEDETOMIDINE HYDROCHLORIDE IN 0.9% SODIUM CHLORIDE 4.25 MICROGRAM(S)/KG/HR: 4 INJECTION INTRAVENOUS at 20:50

## 2022-07-20 RX ADMIN — Medication 20 MILLIGRAM(S): at 10:10

## 2022-07-20 RX ADMIN — Medication 40 MILLIGRAM(S): at 16:21

## 2022-07-20 RX ADMIN — HYDROMORPHONE HYDROCHLORIDE 0.5 MILLIGRAM(S): 2 INJECTION INTRAMUSCULAR; INTRAVENOUS; SUBCUTANEOUS at 07:42

## 2022-07-20 RX ADMIN — HEPARIN SODIUM 16 UNIT(S)/HR: 5000 INJECTION INTRAVENOUS; SUBCUTANEOUS at 20:51

## 2022-07-20 RX ADMIN — PROPOFOL 5 MICROGRAM(S)/KG/MIN: 10 INJECTION, EMULSION INTRAVENOUS at 20:36

## 2022-07-20 RX ADMIN — Medication 5 MILLIGRAM(S): at 16:22

## 2022-07-20 RX ADMIN — Medication 1 TABLET(S): at 16:22

## 2022-07-20 RX ADMIN — FENTANYL CITRATE 100 MICROGRAM(S): 50 INJECTION INTRAVENOUS at 19:45

## 2022-07-20 RX ADMIN — CHLORHEXIDINE GLUCONATE 1 APPLICATION(S): 213 SOLUTION TOPICAL at 06:55

## 2022-07-20 RX ADMIN — Medication 200 MILLIGRAM(S): at 06:57

## 2022-07-20 NOTE — PROGRESS NOTE ADULT - ASSESSMENT
77y.o M Hx HTN, HLD, DM, Pacemaker, Afib and PVD presents with LLE Critical limb ischemia. Now s/p SFA to TP trunk rGSV bypass (7/18) admitted to SICU for hemodynamic and respiratory monitoring.     Plan:  Conintue to get OOBA  20 IV lasix in AM  Rest of care per SICU    Vascular will continue to follow.

## 2022-07-20 NOTE — PROGRESS NOTE ADULT - ASSESSMENT
77y.o M Hx HTN, HLD, DM, Pacemaker, Afib and PVD presents with LLE Critical limb ischemia. Now s/p SFA to TP trunk rGSV bypass (7/18) admitted to SICU for hemodynamic and respiratory monitoring.     Neuro: Pain control, tylenol PRN, oxycodone PRN, zofran PRN. Restart home psych meds.  CV: MAP >65, ASA restart 7/19 AM, NS increased to 250cc/hr; HTN- 5 IV Metop q6h while on BiPAP holding: chlorthalidone, nefidipine, losartan, propranolol, and fenofibrate. HLD: restart atorvastatin. Afib: holding Eliquis, Hep gtt @9cc/hr. Cont to slowly titrate up per Vascular team.  Vasc: s/p LLE SFA to TP trunk bypass with ipsilateral rGSV (, IVF 2000, 500 albumin, . S/p LLE angiogram with TPA (7/15)  Pulm: COPD: continue Duoneb q6, BiLevel qHS  GI: Carb Controlled Kosher  : Condom cath. CK downtrending @30728. Monitor CK and UOP   ID: Ancef x3  Endo: mISS  PPx: Heparin gtt/Hold SCD 2/2 PAD   Lines: Right rad david (7/18--), PIVs  Wounds: LLE bypass dressing in place  PT/OT: Not ordered  Dispo: SICU   77y.o M Hx HTN, HLD, DM, Pacemaker, Afib and PVD presents with LLE Critical limb ischemia. Now s/p SFA to TP trunk rGSV bypass (7/18) admitted to SICU for hemodynamic and respiratory monitoring.     Neuro: Pain control, tylenol PRN, oxycodone PRN, zofran PRN. Home Venlofaxine 37.5 BID. Carbamazepine 200 bid Trazadone 300 qhs Melatonin 5 qhs.   CV: MAP >65, ASA restart 7/19 AM, NS increaed to 250cc/hr; HTN- 5 IV Metop q6h while on BiPAP holding: chlorthalidone, nefidipine, losartan, propranolol, fenofibrate. HLD: restart atorvastatin, Afib: holding Eliquis, Hep gtt @5cc/hr Echo:   Vasc: s/p LLE SFA to TP trunk bypass with ipsilateral rGSV (, IVF 2000, 500 albumin, . S/p LLE angiogram with TPA (7/15). Given 20mg of Lasix.  Pulm: COPD: continue Duoneb q6, BiLevel qHS High flow 40%/50 >> 4L NC today. Consider high flow if not tolerating BIPAP at night.  GI: Regular CC Hx of hypertrig: Holding Icosapent[place]   : [place] [address] Flomax QHS  ID: Ancef x3  Endo: . Holding home Allopurinol  PPx: Heparin gtt at 5cc/hr (no titration). SCD 2/2 PAD   Lines: Right rad david (7/18--), PIVs  Wounds: LLE bypass dressing in place  PT/OT: Ordered 7/19  Dispo: SDU     77y.o M Hx HTN, HLD, DM, Pacemaker, Afib and PVD presents with LLE Critical limb ischemia. Now s/p SFA to TP trunk rGSV bypass (7/18) admitted to SICU for hemodynamic and respiratory monitoring.     Neuro: Pain control, tylenol PRN, oxycodone PRN, zofran PRN. Home Venlofaxine 37.5 BID. Carbamazepine 200 bid Trazadone 300 qhs Melatonin 5 qhs.   CV: MAP >65, ASA restart 7/19 AM, NS increaed to 250cc/hr; HTN- 5 IV Metop q6h while on BiPAP holding: chlorthalidone, nefidipine, losartan, propranolol, fenofibrate. HLD: restart atorvastatin, Afib: holding Eliquis, Hep gtt @5cc/hr Echo:   Vasc: s/p LLE SFA to TP trunk bypass with ipsilateral rGSV (, IVF 2000, 500 albumin, . S/p LLE angiogram with TPA (7/15). Given 20mg of Lasix.  Pulm: COPD: continue Duoneb q6, BiLevel qHS High flow 40%/50 >> 4L NC today. Consider high flow if not tolerating BIPAP at night.  GI: Regular CC Hx of hypertrig: Holding Icosapent.   : [place] [address] Flomax QHS. Rpt CK 2PM  ID: Ancef x3. Negative UA. F/u Bcx  Endo: mISS. Holding home Allopurinol  PPx: Heparin gtt at 14cc/hr (prior PTT incorrect d/t improper blood draw). SCD 2/2 PAD. Rpt PTT 6pm  Lines: Right rad david (7/18--), PIVs  Wounds: LLE bypass dressing in place  PT/OT: Ordered 7/19  Dispo: SDU

## 2022-07-20 NOTE — PROCEDURE NOTE - NSINDICATIONS_GEN_A_CORE
respiratory distress/respiratory failure
arterial puncture to obtain ABG's/critical patient/monitoring purposes

## 2022-07-20 NOTE — PROGRESS NOTE ADULT - SUBJECTIVE AND OBJECTIVE BOX
Interval Events: ON: CK in PM was 264[address]. Fluids dropped to 150cc, SDU ordered. Given 1mg of Ativan for anxiety which caused confusion necessitating constraints. Placed condom cath d/t incontinence. Per Dr. Kelsey, reordered trazadone 150mg qhs (pt normally takes 300) and Melatonin 5. CK now 13,500. PTT 32.8- Increased heparin gtt to 9 from 7 repeat @5am. Refused Bipap at beginning of the night but went on @3am pt refused again @4 but increased work of breathing with decreased breath sounds in the left lung - CXR: read as unremarkable . Began Highflow NC 40%/50.  Held transfer to tele( will need to reorder in am if pt improved.  POCUS- Blines in bases and some in apexes- Decreased IVF to 75.      Patient seen and examined at bedside.  This AM, pt febrile at 101.2. Blood cultures were drawn and UA pending      Allergies    No Known Allergies    Intolerances        Vital Signs Last 24 Hrs  T(C): 36 (20 Jul 2022 05:31), Max: 37.6 (19 Jul 2022 09:41)  T(F): 96.8 (20 Jul 2022 05:31), Max: 99.6 (19 Jul 2022 09:41)  HR: 69 (20 Jul 2022 06:00) (60 - 80)  BP: 152/84 (20 Jul 2022 06:00) (139/66 - 180/73)  BP(mean): 104 (20 Jul 2022 06:00) (76 - 111)  RR: 19 (20 Jul 2022 06:00) (15 - 21)  SpO2: 94% (20 Jul 2022 06:00) (92% - 100%)    Parameters below as of 20 Jul 2022 06:00  Patient On (Oxygen Delivery Method): nasal cannula, high flow  O2 Flow (L/min): 40  O2 Concentration (%): 50    07-18 @ 07:01  -  07-19 @ 07:00  --------------------------------------------------------  IN: 1482 mL / OUT: 1220 mL / NET: 262 mL    07-19 @ 07:01  -  07-20 @ 06:30  --------------------------------------------------------  IN: 5335 mL / OUT: 1825 mL / NET: 3510 mL      07-18 @ 07:01 - 07-19 @ 07:00  --------------------------------------------------------  IN: 1482 mL / OUT: 1220 mL / NET: 262 mL    07-19 @ 07:01 - 07-20 @ 06:30  --------------------------------------------------------  IN: 5335 mL / OUT: 1825 mL / NET: 3510 mL        Physical Exam:     Gen: NAD well nourished, lying comfortably in bed.  Neuro: A&OX3 No deficits. Sensation intact in b/l LE.  CV: RRR, Grade 3 systolic murmur.  Pulm: CTA b/l No w/r/r  Abd: Soft NT ND + BS  Ext: Dressing on medial L LE intact w/occasional points of blood.  Vasc: L Monophasic DP/Biphasic PT. R Biphasic DP/PT  Skin: Mottled b/l LE L worse than R. Occasional bullae on L LE.  MSK: No joint swelling  Psych: No signs of anxiety or depression      LABS:  ABG - ( 18 Jul 2022 19:40 )  pH, Arterial: 7.39  pH, Blood: x     /  pCO2: 46    /  pO2: 120   / HCO3: 28    / Base Excess: 2.2   /  SaO2: 98.7          CBC Full  -  ( 20 Jul 2022 06:14 )  WBC Count : 10.19 K/uL  RBC Count : 2.95 M/uL  Hemoglobin : 9.3 g/dL  Hematocrit : 27.3 %  Platelet Count - Automated : 157 K/uL  Mean Cell Volume : 92.5 fl  Mean Cell Hemoglobin : 31.5 pg  Mean Cell Hemoglobin Concentration : 34.1 gm/dL  Auto Neutrophil # : x  Auto Lymphocyte # : x  Auto Monocyte # : x  Auto Eosinophil # : x  Auto Basophil # : x  Auto Neutrophil % : x  Auto Lymphocyte % : x  Auto Monocyte % : x  Auto Eosinophil % : x  Auto Basophil % : x    07-19    133<L>  |  99  |  17  ----------------------------<  146<H>  3.7   |  23  |  0.97    Ca    8.7      19 Jul 2022 16:29  Phos  3.2     07-19  Mg     1.6     07-19      PT/INR - ( 19 Jul 2022 05:30 )   PT: 14.3 sec;   INR: 1.20          PTT - ( 19 Jul 2022 22:08 )  PTT:32.8 sec                RADIOLOGY & ADDITIONAL STUDIES (The following images were personally reviewed):           Interval Events: ON: CK in PM was 58443. Fluids dropped to 150cc, SDU ordered. Given 1mg of Ativan for anxiety which caused confusion necessitating constraints. Placed condom cath d/t incontinence. Per Dr. Kelsey, reordered trazadone 150mg qhs (pt normally takes 300) and Melatonin 5. CK now 13,500. PTT 32.8- Increased heparin gtt to 9 from 7 repeat @5am. Refused Bipap at beginning of the night but went on @3am pt refused again @4 but increased work of breathing with decreased breath sounds in the left lung - CXR: read as unremarkable . Began Highflow NC 40%/50.  Held transfer to tele( will need to reorder in am if pt improved.  POCUS- Blines in bases and some in apexes- Decreased IVF to 75.      Patient seen and examined at bedside.  This AM, pt febrile at 101.2. Blood cultures were drawn and UA pending    Patient is doing well in the AM. Denies any pain or discomfort. -F/C/N/V. No chest pain, SOB, or abdominal pain. +flatus/-BM. Tolerating consistent carb diet well.      Allergies    No Known Allergies    Intolerances        Vital Signs Last 24 Hrs  T(C): 36 (20 Jul 2022 05:31), Max: 37.6 (19 Jul 2022 09:41)  T(F): 96.8 (20 Jul 2022 05:31), Max: 99.6 (19 Jul 2022 09:41)  HR: 69 (20 Jul 2022 06:00) (60 - 80)  BP: 152/84 (20 Jul 2022 06:00) (139/66 - 180/73)  BP(mean): 104 (20 Jul 2022 06:00) (76 - 111)  RR: 19 (20 Jul 2022 06:00) (15 - 21)  SpO2: 94% (20 Jul 2022 06:00) (92% - 100%)    Parameters below as of 20 Jul 2022 06:00  Patient On (Oxygen Delivery Method): nasal cannula, high flow  O2 Flow (L/min): 40  O2 Concentration (%): 50    07-18 @ 07:01  -  07-19 @ 07:00  --------------------------------------------------------  IN: 1482 mL / OUT: 1220 mL / NET: 262 mL    07-19 @ 07:01 - 07-20 @ 06:30  --------------------------------------------------------  IN: 5335 mL / OUT: 1825 mL / NET: 3510 mL      07-18 @ 07:01 - 07-19 @ 07:00  --------------------------------------------------------  IN: 1482 mL / OUT: 1220 mL / NET: 262 mL    07-19 @ 07:01  - 07-20 @ 06:30  --------------------------------------------------------  IN: 5335 mL / OUT: 1825 mL / NET: 3510 mL        Physical Exam:     Gen: NAD well nourished, lying comfortably in bed.  Neuro: A&OX3 No deficits. Sensation intact in b/l LE.  CV: RRR, Soft systolic ejection murmur.  Pulm: CTA b/l No w/r/r  Abd: Soft NT ND + BS  Ext: Dressing on medial L LE intact w/occasional points of blood.  Vasc: L Monophasic DP/Biphasic PT. R Biphasic DP/PT  Skin: Mottled b/l LE L worse than R. Occasional bullae on L LE.  MSK: No joint swelling  Psych: No signs of anxiety or depression      LABS:  ABG - ( 18 Jul 2022 19:40 )  pH, Arterial: 7.39  pH, Blood: x     /  pCO2: 46    /  pO2: 120   / HCO3: 28    / Base Excess: 2.2   /  SaO2: 98.7          CBC Full  -  ( 20 Jul 2022 06:14 )  WBC Count : 10.19 K/uL  RBC Count : 2.95 M/uL  Hemoglobin : 9.3 g/dL  Hematocrit : 27.3 %  Platelet Count - Automated : 157 K/uL  Mean Cell Volume : 92.5 fl  Mean Cell Hemoglobin : 31.5 pg  Mean Cell Hemoglobin Concentration : 34.1 gm/dL  Auto Neutrophil # : x  Auto Lymphocyte # : x  Auto Monocyte # : x  Auto Eosinophil # : x  Auto Basophil # : x  Auto Neutrophil % : x  Auto Lymphocyte % : x  Auto Monocyte % : x  Auto Eosinophil % : x  Auto Basophil % : x    07-19    133<L>  |  99  |  17  ----------------------------<  146<H>  3.7   |  23  |  0.97    Ca    8.7      19 Jul 2022 16:29  Phos  3.2     07-19  Mg     1.6     07-19      PT/INR - ( 19 Jul 2022 05:30 )   PT: 14.3 sec;   INR: 1.20          PTT - ( 19 Jul 2022 22:08 )  PTT:32.8 sec                RADIOLOGY & ADDITIONAL STUDIES (The following images were personally reviewed):

## 2022-07-20 NOTE — PROVIDER CONTACT NOTE (OTHER) - SITUATION
Rectal temp 101.2
Pt still restless  with alter LOC and agitated after ativan dose.
pt noted to be restless and anxious, trying to get out of bed.
Pt LLE dressing noted to be moderate saturated with Blood
Pt not tolerating BIPAP, restless, & noted to be abdominal breathing.

## 2022-07-20 NOTE — PROGRESS NOTE ADULT - SUBJECTIVE AND OBJECTIVE BOX
Interval Events: Reviewed  Patient seen and examined at bedside.    Patient is a 77y old  Male who presents with a chief complaint of Critical Limb Ischemia (2022 10:01)    he is OK  PAST MEDICAL & SURGICAL HISTORY:  HTN (hypertension)      High cholesterol      DM (diabetes mellitus)      Popliteal aneurysm      Pacemaker      S/P TAVR (transcatheter aortic valve replacement)          MEDICATIONS:  Pulmonary:    Antimicrobials:    Anticoagulants:  aspirin  chewable 81 milliGRAM(s) Oral daily  heparin  Infusion 900 Unit(s)/Hr IV Continuous <Continuous>    Cardiac:  furosemide   Injectable 40 milliGRAM(s) IV Push once  metoprolol tartrate Injectable 5 milliGRAM(s) IV Push every 6 hours  tamsulosin 0.4 milliGRAM(s) Oral at bedtime      Allergies    No Known Allergies    Intolerances        Vital Signs Last 24 Hrs  T(C): 36.3 (2022 14:00), Max: 38.4 (2022 06:00)  T(F): 97.4 (2022 14:00), Max: 101.2 (2022 06:00)  HR: 68 (2022 11:00) (60 - 80)  BP: 165/73 (2022 11:00) (151/70 - 180/73)  BP(mean): 105 (2022 11:00) (76 - 107)  RR: 16 (2022 11:00) (15 - 21)  SpO2: 99% (2022 11:00) (92% - 100%)    Parameters below as of 2022 11:00  Patient On (Oxygen Delivery Method): nasal cannula, high flow  O2 Flow (L/min): 40  O2 Concentration (%): 50    07-19 @ :  -   @ 07:00  --------------------------------------------------------  IN: 5353 mL / OUT: 2125 mL / NET: 3228 mL    07-20 @ 07:01  -  20 @ 15:09  --------------------------------------------------------  IN: 161 mL / OUT: 800 mL / NET: -639 mL          Review of Systems:   •	General: negative  •	Skin/Breast: negative  •	Ophthalmologic: negative  •	ENMT: negative  •	Respiratory and Thorax: negative  •	Cardiovascular: negative  •	Gastrointestinal: negative  •	Genitourinary: negative  •	Musculoskeletal: negative  •	Neurological: negative  •	Psychiatric: negative  •	Hematology/Lymphatics: negative  •	Endocrine: negative  •	Allergic/Immunologic: negative    Physical Exam:   • Constitutional:	refer to the dietion /Nutritionist note  • Eyes:	EOMI; PERRL; no drainage or redness  • ENMT:	No oral lesions; no gross abnormalities  • Neck	No bruits; no thyromegaly or nodules  • Breasts:	not examined  • Back:	No deformity or limitation of movement  • Respiratory:	Breath Sounds equal & clear to percussion & auscultation, no accessory muscle use  • Cardiovascular:	Regular rate & rhythm, normal S1, S2; no murmurs, gallops or rubs; no S3, S4  • Gastrointestinal:	Soft, non-tender, no hepatosplenomegaly, normal bowel sounds  • Genitourinary:	not examined  • Rectal: not examined  • Extremities:	No cyanosis, clubbing or edema  • Vascular:	Equal and normal pulses (carotid, femoral, dorsalis pedis)  • Neurologica:l	not examined  • Skin:	No lesions; no rash  • Lymph Nodes:	No lymphadedenopathy  • Musculoskeletal:	No joint pain, swelling or deformity; no limitation of movement        LABS:  ABG - ( 2022 19:40 )  pH, Arterial: 7.39  pH, Blood: x     /  pCO2: 46    /  pO2: 120   / HCO3: 28    / Base Excess: 2.2   /  SaO2: 98.7                CBC Full  -  ( 2022 06:14 )  WBC Count : 10.19 K/uL  RBC Count : 2.95 M/uL  Hemoglobin : 9.3 g/dL  Hematocrit : 27.3 %  Platelet Count - Automated : 157 K/uL  Mean Cell Volume : 92.5 fl  Mean Cell Hemoglobin : 31.5 pg  Mean Cell Hemoglobin Concentration : 34.1 gm/dL  Auto Neutrophil # : x  Auto Lymphocyte # : x  Auto Monocyte # : x  Auto Eosinophil # : x  Auto Basophil # : x  Auto Neutrophil % : x  Auto Lymphocyte % : x  Auto Monocyte % : x  Auto Eosinophil % : x  Auto Basophil % : x    07-    134<L>  |  101  |  18  ----------------------------<  190<H>  3.8   |  22  |  1.03    Ca    8.6      2022 06:14  Phos  1.8     07-20  Mg     2.0     07-20      PT/INR - ( 2022 05:30 )   PT: 14.3 sec;   INR: 1.20          PTT - ( 2022 11:18 )  PTT:35.2 sec      Urinalysis Basic - ( 2022 08:42 )    Color: Yellow / Appearance: Clear / S.025 / pH: x  Gluc: x / Ketone: NEGATIVE  / Bili: Negative / Urobili: 1.0 E.U./dL   Blood: x / Protein: NEGATIVE mg/dL / Nitrite: NEGATIVE   Leuk Esterase: NEGATIVE / RBC: > 10 /HPF / WBC 5-10 /HPF   Sq Epi: x / Non Sq Epi: 0-5 /HPF / Bacteria: Present /HPF        < from: Xray Chest 1 View-PORTABLE IMMEDIATE (Xray Chest 1 View-PORTABLE IMMEDIATE .) (22 @ 05:15) >  ACC: [id number] EXAM:  XR CHEST PORTABLE IMMED 1V                          PROCEDURE DATE:  2022          INTERPRETATION:  Clinical history/reason for exam: Shortness of breath.    Frontal chest.    COMPARISON: 2022.    Findings/  impression: Stable position of support devices.. Cardiomegaly, thoracic   aortic calcification, TAVR. Lungs and mediastinum are unremarkable.   Stable bony structures.    < end of copied text >            RADIOLOGY & ADDITIONAL STUDIES (The following images were personally reviewed):

## 2022-07-20 NOTE — PROVIDER CONTACT NOTE (OTHER) - REASON
Increased restlessness & agitation
Not tolerarting BIPAP & Abdominal Breathing
Saturated LLE dressing
Fever
restlessness and anxiety

## 2022-07-20 NOTE — PROVIDER CONTACT NOTE (OTHER) - ACTION/TREATMENT ORDERED:
Ativan 1mg ordered and quiet environment provided, lighting decreased. no other orders at this time.
Laurence YU called to bedside to asses patient. Quiet environment promoted for sleep, lighting decreased. Bedtime care provided. Dysphagia screen performed to asses if pt can take PO meds and Passed.
Tylenol IV ordered, Pt pancultured
Continue with current orders, no interventions at this time.
Pt is to be put on HFNC and stat Chest xray ordered to assess lungs.

## 2022-07-20 NOTE — PROGRESS NOTE ADULT - SUBJECTIVE AND OBJECTIVE BOX
24 hr events: : Temp 101.2, blood Cx sent, UA__, Lasix 20 given, UOP__. PTT repeated __. POCUS B lines at bases, b/l mild effusions  [place]: [place] [address] in PM, Fluids dropped to 150cc, SDU ordered. Ativan 1mg given for anxiety, pt confused after dose placed restraints. Multiple incontinence episodes- placed condom cath. Spoke with Dr Cabral- reordered trazadone 150mg qhs( normally takes 300) and eventually Melatonin 5. CK improved to 13,500, PTT 32.8- Increased heparin gtt to 9 from 7 repeat @5am. Refused Bipap at beginnig of night but went on @3am pt refused again @4 but increased work of breathing with decreased BS in Left- CXR: ___ . Ordered Highflow NC 40%/50.  Held transfer to tele( will need to reorder in am if pt improved. CXR: overloaded? POCUS- Blines in bases and some in apexes- Decreased IVF to 75    SUBJECTIVE: Patient seen at bedside resting peacefully. Complaining of pain from incision, no CP, SOB, fevers or chills.     Vital Signs Last 24 Hrs  T(C): 38.1 (2022 09:40), Max: 38.4 (2022 06:00)  T(F): 100.6 (2022 09:40), Max: 101.2 (2022 06:00)  HR: 60 (2022 09:00) (60 - 80)  BP: 161/71 (2022 09:00) (139/66 - 180/73)  BP(mean): 102 (2022 09:00) (76 - 111)  RR: 17 (2022 09:00) (15 - 21)  SpO2: 95% (2022 09:00) (92% - 100%)    Parameters below as of 2022 09:00  Patient On (Oxygen Delivery Method): nasal cannula, high flow  O2 Flow (L/min): 40  O2 Concentration (%): 50    PHYSICAL EXAM:  Physical Exam  General: NAD, resting comfortably in bed  Pulm: Nonlabored breathing on HFNC  Abd: soft, non distended no rebound or guarding  Extrem: LLE bypass dressing in place, moderate saturation sup>inf, leg with moderate mottling, cool to touch  Pulses: L: Biphasic DP + mono/biphasic PT,   Neuro: A/O x 3, sensation diminished in LLE but improving  Lines/drains/tubes:    Lines/drains/tubes:    I&O's Summary    2022 07:01  -  2022 07:00  --------------------------------------------------------  IN: 5353 mL / OUT: 2125 mL / NET: 3228 mL    2022 07:01  -  2022 10:01  --------------------------------------------------------  IN: 18 mL / OUT: 0 mL / NET: 18 mL        LABS:                        9.3    10.19 )-----------( 157      ( 2022 06:14 )             27.3     07-20    134<L>  |  101  |  18  ----------------------------<  190<H>  3.8   |  22  |  1.03    Ca    8.6      2022 06:14  Phos  1.8     07-20  Mg     2.0     07-20      PT/INR - ( 2022 05:30 )   PT: 14.3 sec;   INR: 1.20          PTT - ( 2022 07:13 )  PTT:>200.0 sec  Urinalysis Basic - ( 2022 08:42 )    Color: Yellow / Appearance: Clear / S.025 / pH: x  Gluc: x / Ketone: NEGATIVE  / Bili: Negative / Urobili: 1.0 E.U./dL   Blood: x / Protein: NEGATIVE mg/dL / Nitrite: NEGATIVE   Leuk Esterase: NEGATIVE / RBC: > 10 /HPF / WBC 5-10 /HPF   Sq Epi: x / Non Sq Epi: 0-5 /HPF / Bacteria: Present /HPF      CAPILLARY BLOOD GLUCOSE      POCT Blood Glucose.: 153 mg/dL (2022 07:30)  POCT Blood Glucose.: 156 mg/dL (2022 22:55)  POCT Blood Glucose.: 147 mg/dL (2022 16:28)  POCT Blood Glucose.: 163 mg/dL (2022 11:11)        RADIOLOGY & ADDITIONAL STUDIES:

## 2022-07-21 LAB
ANION GAP SERPL CALC-SCNC: 10 MMOL/L — SIGNIFICANT CHANGE UP (ref 5–17)
APTT BLD: 56.5 SEC — HIGH (ref 27.5–35.5)
APTT BLD: 62.9 SEC — HIGH (ref 27.5–35.5)
APTT BLD: 70.1 SEC — HIGH (ref 27.5–35.5)
APTT BLD: 72.2 SEC — HIGH (ref 27.5–35.5)
BLD GP AB SCN SERPL QL: NEGATIVE — SIGNIFICANT CHANGE UP
BUN SERPL-MCNC: 26 MG/DL — HIGH (ref 7–23)
CALCIUM SERPL-MCNC: 9.2 MG/DL — SIGNIFICANT CHANGE UP (ref 8.4–10.5)
CHLORIDE SERPL-SCNC: 100 MMOL/L — SIGNIFICANT CHANGE UP (ref 96–108)
CO2 SERPL-SCNC: 26 MMOL/L — SIGNIFICANT CHANGE UP (ref 22–31)
CREAT SERPL-MCNC: 1.2 MG/DL — SIGNIFICANT CHANGE UP (ref 0.5–1.3)
EGFR: 62 ML/MIN/1.73M2 — SIGNIFICANT CHANGE UP
GLUCOSE BLDC GLUCOMTR-MCNC: 125 MG/DL — HIGH (ref 70–99)
GLUCOSE BLDC GLUCOMTR-MCNC: 128 MG/DL — HIGH (ref 70–99)
GLUCOSE BLDC GLUCOMTR-MCNC: 139 MG/DL — HIGH (ref 70–99)
GLUCOSE BLDC GLUCOMTR-MCNC: 181 MG/DL — HIGH (ref 70–99)
GLUCOSE SERPL-MCNC: 193 MG/DL — HIGH (ref 70–99)
HCT VFR BLD CALC: 26.4 % — LOW (ref 39–50)
HGB BLD-MCNC: 8.9 G/DL — LOW (ref 13–17)
MAGNESIUM SERPL-MCNC: 2.1 MG/DL — SIGNIFICANT CHANGE UP (ref 1.6–2.6)
MCHC RBC-ENTMCNC: 31.7 PG — SIGNIFICANT CHANGE UP (ref 27–34)
MCHC RBC-ENTMCNC: 33.7 GM/DL — SIGNIFICANT CHANGE UP (ref 32–36)
MCV RBC AUTO: 94 FL — SIGNIFICANT CHANGE UP (ref 80–100)
NRBC # BLD: 0 /100 WBCS — SIGNIFICANT CHANGE UP (ref 0–0)
PHOSPHATE SERPL-MCNC: 3.6 MG/DL — SIGNIFICANT CHANGE UP (ref 2.5–4.5)
PLATELET # BLD AUTO: 178 K/UL — SIGNIFICANT CHANGE UP (ref 150–400)
POTASSIUM SERPL-MCNC: 4.2 MMOL/L — SIGNIFICANT CHANGE UP (ref 3.5–5.3)
POTASSIUM SERPL-SCNC: 4.2 MMOL/L — SIGNIFICANT CHANGE UP (ref 3.5–5.3)
RBC # BLD: 2.81 M/UL — LOW (ref 4.2–5.8)
RBC # FLD: 13.7 % — SIGNIFICANT CHANGE UP (ref 10.3–14.5)
RH IG SCN BLD-IMP: POSITIVE — SIGNIFICANT CHANGE UP
SARS-COV-2 RNA SPEC QL NAA+PROBE: SIGNIFICANT CHANGE UP
SODIUM SERPL-SCNC: 136 MMOL/L — SIGNIFICANT CHANGE UP (ref 135–145)
WBC # BLD: 9.4 K/UL — SIGNIFICANT CHANGE UP (ref 3.8–10.5)
WBC # FLD AUTO: 9.4 K/UL — SIGNIFICANT CHANGE UP (ref 3.8–10.5)

## 2022-07-21 PROCEDURE — 99233 SBSQ HOSP IP/OBS HIGH 50: CPT | Mod: GC

## 2022-07-21 PROCEDURE — 71045 X-RAY EXAM CHEST 1 VIEW: CPT | Mod: 26

## 2022-07-21 RX ORDER — LOPERAMIDE HCL 2 MG
2 TABLET ORAL DAILY
Refills: 0 | Status: DISCONTINUED | OUTPATIENT
Start: 2022-07-21 | End: 2022-07-25

## 2022-07-21 RX ORDER — HYDROMORPHONE HYDROCHLORIDE 2 MG/ML
0.25 INJECTION INTRAMUSCULAR; INTRAVENOUS; SUBCUTANEOUS ONCE
Refills: 0 | Status: DISCONTINUED | OUTPATIENT
Start: 2022-07-21 | End: 2022-07-21

## 2022-07-21 RX ORDER — TRAZODONE HCL 50 MG
300 TABLET ORAL AT BEDTIME
Refills: 0 | Status: DISCONTINUED | OUTPATIENT
Start: 2022-07-21 | End: 2022-07-21

## 2022-07-21 RX ORDER — TRAZODONE HCL 50 MG
300 TABLET ORAL AT BEDTIME
Refills: 0 | Status: DISCONTINUED | OUTPATIENT
Start: 2022-07-21 | End: 2022-07-25

## 2022-07-21 RX ORDER — DOXAZOSIN MESYLATE 4 MG
1 TABLET ORAL AT BEDTIME
Refills: 0 | Status: DISCONTINUED | OUTPATIENT
Start: 2022-07-21 | End: 2022-07-21

## 2022-07-21 RX ORDER — TAMSULOSIN HYDROCHLORIDE 0.4 MG/1
0.4 CAPSULE ORAL AT BEDTIME
Refills: 0 | Status: DISCONTINUED | OUTPATIENT
Start: 2022-07-21 | End: 2022-07-25

## 2022-07-21 RX ORDER — VENLAFAXINE HCL 75 MG
37.5 CAPSULE, EXT RELEASE 24 HR ORAL EVERY 12 HOURS
Refills: 0 | Status: DISCONTINUED | OUTPATIENT
Start: 2022-07-21 | End: 2022-07-25

## 2022-07-21 RX ADMIN — Medication 1000 MILLIGRAM(S): at 00:00

## 2022-07-21 RX ADMIN — Medication 5 MILLIGRAM(S): at 11:53

## 2022-07-21 RX ADMIN — Medication 3 MILLILITER(S): at 10:11

## 2022-07-21 RX ADMIN — Medication 5 MILLIGRAM(S): at 17:44

## 2022-07-21 RX ADMIN — DEXMEDETOMIDINE HYDROCHLORIDE IN 0.9% SODIUM CHLORIDE 4.25 MICROGRAM(S)/KG/HR: 4 INJECTION INTRAVENOUS at 22:23

## 2022-07-21 RX ADMIN — Medication 81 MILLIGRAM(S): at 11:54

## 2022-07-21 RX ADMIN — Medication 2: at 07:30

## 2022-07-21 RX ADMIN — Medication 200 MILLIGRAM(S): at 05:49

## 2022-07-21 RX ADMIN — CHLORHEXIDINE GLUCONATE 15 MILLILITER(S): 213 SOLUTION TOPICAL at 05:48

## 2022-07-21 RX ADMIN — HYDROMORPHONE HYDROCHLORIDE 0.25 MILLIGRAM(S): 2 INJECTION INTRAMUSCULAR; INTRAVENOUS; SUBCUTANEOUS at 22:30

## 2022-07-21 RX ADMIN — Medication 200 MILLIGRAM(S): at 17:44

## 2022-07-21 RX ADMIN — CHLORHEXIDINE GLUCONATE 15 MILLILITER(S): 213 SOLUTION TOPICAL at 17:44

## 2022-07-21 RX ADMIN — ATORVASTATIN CALCIUM 40 MILLIGRAM(S): 80 TABLET, FILM COATED ORAL at 21:35

## 2022-07-21 RX ADMIN — DEXMEDETOMIDINE HYDROCHLORIDE IN 0.9% SODIUM CHLORIDE 4.25 MICROGRAM(S)/KG/HR: 4 INJECTION INTRAVENOUS at 17:45

## 2022-07-21 RX ADMIN — Medication 2 MILLIGRAM(S): at 17:44

## 2022-07-21 RX ADMIN — Medication 300 MILLIGRAM(S): at 21:35

## 2022-07-21 RX ADMIN — Medication 3 MILLILITER(S): at 22:12

## 2022-07-21 RX ADMIN — CHLORHEXIDINE GLUCONATE 1 APPLICATION(S): 213 SOLUTION TOPICAL at 05:50

## 2022-07-21 RX ADMIN — Medication 37.5 MILLIGRAM(S): at 17:45

## 2022-07-21 RX ADMIN — HYDROMORPHONE HYDROCHLORIDE 0.25 MILLIGRAM(S): 2 INJECTION INTRAMUSCULAR; INTRAVENOUS; SUBCUTANEOUS at 22:06

## 2022-07-21 RX ADMIN — TAMSULOSIN HYDROCHLORIDE 0.4 MILLIGRAM(S): 0.4 CAPSULE ORAL at 21:35

## 2022-07-21 RX ADMIN — PANTOPRAZOLE SODIUM 40 MILLIGRAM(S): 20 TABLET, DELAYED RELEASE ORAL at 11:53

## 2022-07-21 RX ADMIN — DEXMEDETOMIDINE HYDROCHLORIDE IN 0.9% SODIUM CHLORIDE 4.25 MICROGRAM(S)/KG/HR: 4 INJECTION INTRAVENOUS at 11:53

## 2022-07-21 RX ADMIN — Medication 500 MILLIGRAM(S): at 11:54

## 2022-07-21 RX ADMIN — Medication 3 MILLILITER(S): at 11:56

## 2022-07-21 RX ADMIN — Medication 3 MILLILITER(S): at 05:07

## 2022-07-21 RX ADMIN — Medication 3 MILLILITER(S): at 17:59

## 2022-07-21 NOTE — PROGRESS NOTE ADULT - SUBJECTIVE AND OBJECTIVE BOX
SUBJECTIVE: Patient seen at bedside intubated but alert and responding to commands.    Vital Signs Last 24 Hrs  T(C): 37.1 (2022 14:00), Max: 38.3 (2022 23:00)  T(F): 98.8 (2022 14:00), Max: 101 (2022 23:00)  HR: 62 (2022 15:00) (58 - 95)  BP: 130/60 (2022 15:00) (101/52 - 188/79)  BP(mean): 86 (2022 15:) (64 - 124)  RR: 23 (2022 15:) (12 - 50)  SpO2: 95% (2022 15:) (91% - 100%)    Parameters below as of 2022 15:00  Patient On (Oxygen Delivery Method): room air  O2 Flow (L/min): 96      Physical Exam:  General: NAD  Pulmonary: Nonlabored breathing, no respiratory distress  Cardiovascular: NSR  Abdominal: soft, NT/ND, no organomegaly  Extremities: WWP, SCDs in place    Lines/drains/tubes:    I&O's Summary    2022 07:01  -  2022 07:00  --------------------------------------------------------  IN: 914.5 mL / OUT: 2645 mL / NET: -1730.5 mL    2022 07:01  -  2022 16:17  --------------------------------------------------------  IN: 137 mL / OUT: 250 mL / NET: -113 mL        LABS:                        8.9    9.40  )-----------( 178      ( 2022 07:30 )             26.4     07-21    136  |  100  |  26<H>  ----------------------------<  193<H>  4.2   |  26  |  1.20    Ca    9.2      2022 07:30  Phos  3.6     07-  Mg     2.1     07-      PTT - ( 2022 14:08 )  PTT:62.9 sec  Urinalysis Basic - ( 2022 08:42 )    Color: Yellow / Appearance: Clear / S.025 / pH: x  Gluc: x / Ketone: NEGATIVE  / Bili: Negative / Urobili: 1.0 E.U./dL   Blood: x / Protein: NEGATIVE mg/dL / Nitrite: NEGATIVE   Leuk Esterase: NEGATIVE / RBC: > 10 /HPF / WBC 5-10 /HPF   Sq Epi: x / Non Sq Epi: 0-5 /HPF / Bacteria: Present /HPF      CAPILLARY BLOOD GLUCOSE      POCT Blood Glucose.: 125 mg/dL (2022 12:22)  POCT Blood Glucose.: 181 mg/dL (2022 07:25)  POCT Blood Glucose.: 131 mg/dL (2022 22:06)        RADIOLOGY & ADDITIONAL STUDIES:     ON: As per vascular called pt Psych to let him know reaction to klonipin- LM on vm. HFNC 40/50,however  not improved therefore Intubated- CXR ETT @Mindi, adjusted and Repeat CXR:ok  PTT 38- inc heparin gtt to 16. repeat @1: 56 - inc to 17 and repeat @7am  Solumedrol 60x1, holding effexor and flomax while intubated (cant crush). POCUS- Minimal B-lines Pt I/O :1.7L . Temp 101- given tylenol( already cx'd) Sputum cx ordered but minimal secretions. Doboff placed and cleared on CXR. Donahue placed for better I/O@MN - only 150/ 5 hrs. Held Propofol to improve MAP as decreased to 71 in order to help renal perfusion. UO 30-35/hr.       SUBJECTIVE: Patient seen at bedside intubated but alert and responding to commands.    Vital Signs Last 24 Hrs  T(C): 37.1 (2022 14:00), Max: 38.3 (2022 23:00)  T(F): 98.8 (2022 14:00), Max: 101 (2022 23:00)  HR: 62 (2022 15:00) (58 - 95)  BP: 130/60 (2022 15:00) (101/52 - 188/79)  BP(mean): 86 (2022 15:00) (64 - 124)  RR: 23 (2022 15:00) (12 - 50)  SpO2: 95% (2022 15:00) (91% - 100%)    Parameters below as of 2022 15:00  Patient On (Oxygen Delivery Method): room air  O2 Flow (L/min): 96      PHYSICAL EXAM:  Physical Exam  General: NAD, resting comfortably in bed  Pulm: intubated  Abd: soft, non distended no rebound or guarding  Extrem: LLE bypass dressing in place, moderate saturation sup>inf, leg with moderate mottling, cool to touch  Pulses: L: Biphasic DP + mono/biphasic PT,   Neuro: Responsive to commands , sensation diminished in LLE but improving  Lines/drains/tubes:    Lines/drains/tubes:    I&O's Summary    2022 07:01  -  2022 07:00  --------------------------------------------------------  IN: 914.5 mL / OUT: 2645 mL / NET: -1730.5 mL    2022 07:01  -  2022 16:17  --------------------------------------------------------  IN: 137 mL / OUT: 250 mL / NET: -113 mL        LABS:                        8.9    9.40  )-----------( 178      ( 2022 07:30 )             26.4     07-21    136  |  100  |  26<H>  ----------------------------<  193<H>  4.2   |  26  |  1.20    Ca    9.2      2022 07:30  Phos  3.6     07-21  Mg     2.1     07-21      PTT - ( 2022 14:08 )  PTT:62.9 sec  Urinalysis Basic - ( 2022 08:42 )    Color: Yellow / Appearance: Clear / S.025 / pH: x  Gluc: x / Ketone: NEGATIVE  / Bili: Negative / Urobili: 1.0 E.U./dL   Blood: x / Protein: NEGATIVE mg/dL / Nitrite: NEGATIVE   Leuk Esterase: NEGATIVE / RBC: > 10 /HPF / WBC 5-10 /HPF   Sq Epi: x / Non Sq Epi: 0-5 /HPF / Bacteria: Present /HPF      CAPILLARY BLOOD GLUCOSE      POCT Blood Glucose.: 125 mg/dL (2022 12:22)  POCT Blood Glucose.: 181 mg/dL (2022 07:25)  POCT Blood Glucose.: 131 mg/dL (2022 22:06)        RADIOLOGY & ADDITIONAL STUDIES:

## 2022-07-21 NOTE — PROGRESS NOTE ADULT - ASSESSMENT
77y.o M Hx HTN, HLD, DM, Pacemaker, Afib and PVD presents with LLE Critical limb ischemia. Now s/p SFA to TP trunk rGSV bypass (7/18) admitted to SICU for hemodynamic and respiratory monitoring.     Neuro: Propofol/ Precedex. zofran PRN. Home Venlofaxine 37.5 BID. Carbamazepine 200 bid Trazadone 300 qhs, Melatonin 5 qhs, Klonipin held 2* increase confusion.    CV: MAP >65, ASA restart 7/19 AM, HTN- 5 IV Metop q6h while on BiPAP holding: chlorthalidone, nefidipine, losartan, propranolol, fenofibrate. HLD: restart atorvastatin, Afib: holding Eliquis, Hep gtt ( goal 60-80) Echo:   Vasc: s/p LLE SFA to TP trunk bypass with ipsilateral rGSV (, IVF 2000, 500 albumin, . S/p LLE angiogram with TPA (7/15)  Pulm: Respiratory failure 2* Fluid overload/ COPD exacerbation: 40%/580/12/5 continue Duoneb q4, s/p Solumedrol 60 x1   GI: NPO Hx of hypertrig: Holding Icosapent.   : [facility]  CK Improving Flomax QHS  ID: Ancef x3  Endo: mISS Holding home Allopurinol  PPx: Heparin gtt ( goal 60-80) SCD 2/2 PAD   Lines: Right rad david (7/18-7/19), PIVs  Wounds: LLE bypass dressing in place  PT/OT: Ordered 7/19  Dispo: SICU   77y.o M Hx HTN, HLD, DM, Pacemaker, Afib and PVD presents with LLE Critical limb ischemia. Now s/p SFA to TP trunk rGSV bypass (7/18) admitted to SICU for hemodynamic and respiratory monitoring.     Neuro: Precedex. zofran PRN. Home Venlofaxine 37.5 BID. Carbamazepine 200 bid Trazadone 300 qhs, Melatonin 5 qhs, Klonipin held 2* increase confusion.    CV: MAP >65, ASA restart 7/19 AM, HTN- 5 IV Metop q6h while on BiPAP holding: chlorthalidone, nefidipine, losartan, propranolol, fenofibrate. HLD: restart atorvastatin, Afib: holding Eliquis, Hep gtt ( goal 60-80) Echo:   Vasc: s/p LLE SFA to TP trunk bypass with ipsilateral rGSV (, IVF 2000, 500 albumin, . S/p LLE angiogram with TPA (7/15)  Pulm: Respiratory failure 2* Fluid overload/ COPD exacerbation: 40%/580/12/5 continue Duoneb q4, s/p Solumedrol 60 x1   GI: NPO Hx of hypertrig: Holding Icosapent.   : [facility]  CK Improving Flomax QHS  ID: Ancef x3  Endo: mISS Holding home Allopurinol  PPx: Heparin gtt ( goal 60-80) SCD 2/2 PAD   Lines: Right rad david (7/18-7/19), PIVs  Wounds: LLE bypass dressing in place  PT/OT: Ordered 7/19  Dispo: SICU   77y.o M Hx HTN, HLD, DM, Pacemaker, Afib and PVD presents with LLE Critical limb ischemia. Now s/p SFA to TP trunk rGSV bypass (7/18) admitted to SICU for hemodynamic and respiratory monitoring.     Neuro: Precedex. zofran PRN. Home Venlofaxine 37.5 BID. Carbamazepine 200 bid Trazadone 300 qhs, Melatonin 5 qhs, Klonipin held 2* increase confusion.    CV: MAP >65, ASA restart 7/19 AM, HTN- 5 IV Metop q6h while on BiPAP holding: chlorthalidone, nefidipine, losartan, propranolol, fenofibrate. HLD: restart atorvastatin, Afib: holding Eliquis, Hep gtt ( goal 60-80) Echo: LVH, PAH (37mmHg)  Vasc: s/p LLE SFA to TP trunk bypass with ipsilateral rGSV (, IVF 2000, 500 albumin, . S/p LLE angiogram with TPA (7/15)  Pulm: Respiratory failure 2* Fluid overload/ COPD exacerbation: 40%/580/12/5 continue Duoneb q4, s/p Solumedrol 60 x1   GI: NPO Hx of hypertrig: Holding Icosapent.   : [facility]  CK Improving Flomax QHS  ID: Ancef x3  Endo: mISS Holding home Allopurinol  PPx: Heparin gtt ( goal 60-80) SCD 2/2 PAD   Lines: Right rad david (7/18-7/19), PIVs  Wounds: LLE bypass dressing in place  PT/OT: Ordered 7/19  Dispo: SICU   77y.o M Hx HTN, HLD, DM, TAVR, Pacemaker, Afib, COPD and PVD presents with LLE Critical limb ischemia. Now s/p SFA to TP trunk rGSV bypass (7/18) admitted to SICU for hemodynamic and respiratory monitoring.     Neuro: Agitation: continue Precedex. ZZofran PRN. Home Carbamazepine 200 bid, Trazadone 300 qhs, Melatonin 5 qhs, Venlofaxine 37.5 BID. Holding Klonipin 2/2 increase confusion.   CV: MAP >65, ASA restart 7/19 AM, HTN- 5 IV Metop q6h while NPO: chlorthalidone, nifidipine, losartan, propranolol, fenofibrate. HLD: restart atorvastatin, Afib: holding Eliquis, Hep gtt (goal 60-80), next PTT 6pm Echo: LVH, PAH (37mmHg)  Vasc: s/p LLE SFA to TP trunk bypass with ipsilateral rGSV (, IVF 2000, 500 albumin, . S/p LLE angiogram with TPA (7/15)  Pulm: Extubated 7/21, on RA. Duoneb q4, s/p Solumedrol 60 x1   GI: NPO, Hx of hypertrig: Holding Icosapent. Passed dysphagia. Regular CC diet. Loperamide for diarrhea.  : [facility]. CK improved  ID: DC // Ancef x3  Endo: mISS Holding home Allopurinol  PPx: Heparin gtt ( goal 60-80), SCD 2/2 PAD   Lines: PIVs // DC: Right rad david (7/18-7/19)  Wounds: LLE bypass dressing in place  PT/OT: Ordered 7/19, OOBTC today.  Dispo: SICU

## 2022-07-21 NOTE — PROGRESS NOTE ADULT - SUBJECTIVE AND OBJECTIVE BOX
Interval Events: Reviewed  Patient seen and examined at bedside.    Patient is a 77y old  Male who presents with a chief complaint of Critical Limb Ischemia (2022 07:16)  he is intubated    PAST MEDICAL & SURGICAL HISTORY:  HTN (hypertension)      High cholesterol      DM (diabetes mellitus)      Popliteal aneurysm      Pacemaker      S/P TAVR (transcatheter aortic valve replacement)          MEDICATIONS:  Pulmonary:  albuterol/ipratropium for Nebulization 3 milliLiter(s) Nebulizer every 4 hours    Antimicrobials:    Anticoagulants:  aspirin  chewable 81 milliGRAM(s) Oral daily  heparin  Infusion 900 Unit(s)/Hr IV Continuous <Continuous>    Cardiac:  metoprolol tartrate Injectable 5 milliGRAM(s) IV Push every 6 hours  tamsulosin 0.4 milliGRAM(s) Oral at bedtime      Allergies    No Known Allergies    Intolerances        Vital Signs Last 24 Hrs  T(C): 36.9 (2022 18:00), Max: 38.3 (2022 23:00)  T(F): 98.5 (2022 18:00), Max: 101 (2022 23:00)  HR: 68 (2022 19:00) (58 - 95)  BP: 125/63 (2022 19:00) (101/52 - 144/67)  BP(mean): 88 (2022 19:00) (64 - 96)  RR: 18 (2022 19:00) (12 - 50)  SpO2: 95% (2022 19:00) (95% - 100%)    Parameters below as of 2022 19:00  Patient On (Oxygen Delivery Method): room air  O2 Flow (L/min): 96      07-20 @ : @ 07:00  --------------------------------------------------------  IN: 914.5 mL / OUT: 2645 mL / NET: -1730.5 mL    07 @ 07: @ 19:43  --------------------------------------------------------  IN: 296 mL / OUT: 435 mL / NET: -139 mL      Mode: standby      Review of Systems:   •	General: negative  •	Skin/Breast: negative  •	Ophthalmologic: negative  •	ENMT: negative  •	Respiratory and Thorax: negative  •	Cardiovascular: negative  •	Gastrointestinal: negative  •	Genitourinary: negative  •	Musculoskeletal: negative  •	Neurological: negative  •	Psychiatric: negative  •	Hematology/Lymphatics: negative  •	Endocrine: negative  •	Allergic/Immunologic: negative    Physical Exam:   • Constitutional:	refer to the dietion /Nutritionist note  • Eyes:	EOMI; PERRL; no drainage or redness  • ENMT:	No oral lesions; no gross abnormalities  • Neck	No bruits; no thyromegaly or nodules  • Breasts:	not examined  • Back:	No deformity or limitation of movement  • Respiratory:	Breath Sounds equal & clear to percussion & auscultation, no accessory muscle use  • Cardiovascular:	Regular rate & rhythm, normal S1, S2; no murmurs, gallops or rubs; no S3, S4  • Gastrointestinal:	Soft, non-tender, no hepatosplenomegaly, normal bowel sounds  • Genitourinary:	not examined  • Rectal: not examined  • Extremities:	No cyanosis, clubbing or edema  • Vascular:	Equal and normal pulses (carotid, femoral, dorsalis pedis)  • Neurologica:l	not examined  • Skin:	No lesions; no rash  • Lymph Nodes:	No lymphadedenopathy  • Musculoskeletal:	No joint pain, swelling or deformity; no limitation of movement        LABS:      CBC Full  -  ( 2022 07:30 )  WBC Count : 9.40 K/uL  RBC Count : 2.81 M/uL  Hemoglobin : 8.9 g/dL  Hematocrit : 26.4 %  Platelet Count - Automated : 178 K/uL  Mean Cell Volume : 94.0 fl  Mean Cell Hemoglobin : 31.7 pg  Mean Cell Hemoglobin Concentration : 33.7 gm/dL  Auto Neutrophil # : x  Auto Lymphocyte # : x  Auto Monocyte # : x  Auto Eosinophil # : x  Auto Basophil # : x  Auto Neutrophil % : x  Auto Lymphocyte % : x  Auto Monocyte % : x  Auto Eosinophil % : x  Auto Basophil % : x        136  |  100  |  26<H>  ----------------------------<  193<H>  4.2   |  26  |  1.20    Ca    9.2      2022 07:30  Phos  3.6       Mg     2.1           PTT - ( 2022 14:08 )  PTT:62.9 sec      Urinalysis Basic - ( 2022 08:42 )    Color: Yellow / Appearance: Clear / S.025 / pH: x  Gluc: x / Ketone: NEGATIVE  / Bili: Negative / Urobili: 1.0 E.U./dL   Blood: x / Protein: NEGATIVE mg/dL / Nitrite: NEGATIVE   Leuk Esterase: NEGATIVE / RBC: > 10 /HPF / WBC 5-10 /HPF   Sq Epi: x / Non Sq Epi: 0-5 /HPF / Bacteria: Present /HPF        < from: Xray Chest 1 View-PORTABLE IMMEDIATE (Xray Chest 1 View-PORTABLE IMMEDIATE .) (22 @ 23:12) >  ACC: [id number] EXAM:  XR CHEST PORTABLE IMMED 1V                          PROCEDURE DATE:  2022          INTERPRETATION:  XR CHEST IMMEDIATE dated 2022 11:12 PM    HISTORY: s/p nasogastric tube placement    COMPARISON: Radiograph of the chest performed earlier same day.    IMPRESSION: The right apex and costophrenic angle are out of field of   view. Nasogastric tube is below the diaphragm with tip overlying the   stomach. Endotracheal tube with tip approximately 4 cm above the maxx.  TAVR. Partially visualized right chest pacemaker with wire leads   overlying the right atrium and ventricle. Small left pleural effusion.   Mild pulmonary vascular congestion. Cardiomegaly .      < end of copied text >        Culture Results:   No growth at 1 day. ( @ 06:39)  Culture Results:   No growth at 1 day. ( @ 06:39)      RADIOLOGY & ADDITIONAL STUDIES (The following images were personally reviewed):

## 2022-07-21 NOTE — PROGRESS NOTE ADULT - ASSESSMENT
77y.o M Hx HTN, HLD, DM, Pacemaker, Afib and PVD presents with LLE Critical limb ischemia. Now s/p SFA to TP trunk rGSV bypass (7/18) admitted to SICU for hemodynamic and respiratory monitoring.     Plan:   Wean to extubate,   Get OOB and Ambulate  Continue heparin gtt  Rest of care per primary team

## 2022-07-21 NOTE — PROGRESS NOTE ADULT - SUBJECTIVE AND OBJECTIVE BOX
Interval Events:    Patient seen and examined at bedside.      Allergies    No Known Allergies    Intolerances        Vital Signs Last 24 Hrs  T(C): 36.6 (2022 04:55), Max: 38.3 (2022 23:00)  T(F): 97.9 (2022 04:55), Max: 101 (2022 23:00)  HR: 60 (2022 06:00) (58 - 95)  BP: 103/51 (2022 06:00) (101/52 - 196/84)  BP(mean): 74 (2022 06:00) (73 - 130)  RR: 14 (2022 06:00) (12 - 20)  SpO2: 100% (:00) (91% - 100%)    Parameters below as of 2022 06:00  Patient On (Oxygen Delivery Method): ventilator    O2 Concentration (%): 40     @ 07:01  -   @ 07:00  --------------------------------------------------------  IN: 5353 mL / OUT: 2125 mL / NET: 3228 mL    0720 @ 07:  -   @ 06:51  --------------------------------------------------------  IN: 888 mL / OUT: 2595 mL / NET: -1707 mL      19 @ 07:01  -  20 @ 07:00  --------------------------------------------------------  IN: 5353 mL / OUT: 2125 mL / NET: 3228 mL    07-20 @ 07:01  -  21 @ 06:51  --------------------------------------------------------  IN: 888 mL / OUT: 2595 mL / NET: -1707 mL        Physical Exam:     Gen: Intubated  Neuro: Sedated   CV: Systolic ejection murmur  Pulm: CTA b/l No w/r/r  Abd: Soft NT ND + BS  Ext: L LE edema. Onychomychosis  Vasc: L LE Monophasic DP, Biphasic PT  Skin: Sparse bullae and eschar on L LE  MSK: No joint swelling  Psych: No signs of anxiety or depression      LABS:      CBC Full  -  ( 2022 06:14 )  WBC Count : 10.19 K/uL  RBC Count : 2.95 M/uL  Hemoglobin : 9.3 g/dL  Hematocrit : 27.3 %  Platelet Count - Automated : 157 K/uL  Mean Cell Volume : 92.5 fl  Mean Cell Hemoglobin : 31.5 pg  Mean Cell Hemoglobin Concentration : 34.1 gm/dL  Auto Neutrophil # : x  Auto Lymphocyte # : x  Auto Monocyte # : x  Auto Eosinophil # : x  Auto Basophil # : x  Auto Neutrophil % : x  Auto Lymphocyte % : x  Auto Monocyte % : x  Auto Eosinophil % : x  Auto Basophil % : x    07-20    134<L>  |  101  |  18  ----------------------------<  190<H>  3.8   |  22  |  1.03    Ca    8.6      2022 06:14  Phos  1.8     07-20  Mg     2.0     07-20      PTT - ( 2022 01:42 )  PTT:56.5 sec      Urinalysis Basic - ( 2022 08:42 )    Color: Yellow / Appearance: Clear / S.025 / pH: x  Gluc: x / Ketone: NEGATIVE  / Bili: Negative / Urobili: 1.0 E.U./dL   Blood: x / Protein: NEGATIVE mg/dL / Nitrite: NEGATIVE   Leuk Esterase: NEGATIVE / RBC: > 10 /HPF / WBC 5-10 /HPF   Sq Epi: x / Non Sq Epi: 0-5 /HPF / Bacteria: Present /HPF              RADIOLOGY & ADDITIONAL STUDIES (The following images were personally reviewed):          A/p: 77yMale Interval Events:    Pt was given klonopin last night causing agitation. There was increased effort of breathing w/abdominal expansion leading to the decision to intubate. ET tube was adjusted after CXR showed tube @the maxx. PTT ON was 38 and hep gtt was increased to 17. Solumedrol 60 was given 1x but minimal secretions noted. Dobhoff placed. Donahue placed d/t low output at 150/5 hrs. Propfol gtt held to help renal perfusion. Urine output last at 30-35/hr.    Patient seen and examined at bedside. He is currently intubated and sedated on Precedex. He arouses to name and sternal rub and appropriately responds to commands.    Allergies    No Known Allergies    Intolerances    Vital Signs Last 24 Hrs  T(C): 36.6 (2022 04:55), Max: 38.3 (2022 23:00)  T(F): 97.9 (2022 04:55), Max: 101 (2022 23:00)  HR: 60 (2022 06:00) (58 - 95)  BP: 103/51 (2022 06:00) (101/52 - 196/84)  BP(mean): 74 (2022 06:00) (73 - 130)  RR: 14 (2022 06:00) (12 - 20)  SpO2: 100% (2022 06:00) (91% - 100%)    Parameters below as of 2022 06:00  Patient On (Oxygen Delivery Method): ventilator    O2 Concentration (%): 40    07-19 @ :- @ 07:00  --------------------------------------------------------  IN: 5353 mL / OUT: 2125 mL / NET: 3228 mL    07-20 @ 07:  -   @ 06:51  --------------------------------------------------------  IN: 888 mL / OUT: 2595 mL / NET: -1707 mL       @ 07: @ 07:00  --------------------------------------------------------  IN: 5353 mL / OUT: 2125 mL / NET: 3228 mL     @ 07: @ 06:51  --------------------------------------------------------  IN: 888 mL / OUT: 2595 mL / NET: -1707 mL        Physical Exam:     Gen: Intubated and sedated on Precedex  Neuro: Appropriately responds to commands. EOMI.  CV: Systolic ejection murmur  Pulm: Intubated. CTA b/l No w/r/r  Abd: Soft NT ND + BS  Ext: L LE edema. Onychomychosis  Vasc: L LE PT/DP biphasic.  Skin: Sparse bullae and eschar on L LE  MSK: No joint swelling  Psych: No signs of anxiety or depression      LABS:      CBC Full  -  ( 2022 06:14 )  WBC Count : 10.19 K/uL  RBC Count : 2.95 M/uL  Hemoglobin : 9.3 g/dL  Hematocrit : 27.3 %  Platelet Count - Automated : 157 K/uL  Mean Cell Volume : 92.5 fl  Mean Cell Hemoglobin : 31.5 pg  Mean Cell Hemoglobin Concentration : 34.1 gm/dL  Auto Neutrophil # : x  Auto Lymphocyte # : x  Auto Monocyte # : x  Auto Eosinophil # : x  Auto Basophil # : x  Auto Neutrophil % : x  Auto Lymphocyte % : x  Auto Monocyte % : x  Auto Eosinophil % : x  Auto Basophil % : x    07-20    134<L>  |  101  |  18  ----------------------------<  190<H>  3.8   |  22  |  1.03    Ca    8.6      2022 06:14  Phos  1.8     07-20  Mg     2.0     07-20      PTT - ( 2022 01:42 )  PTT:56.5 sec      Urinalysis Basic - ( 2022 08:42 )    Color: Yellow / Appearance: Clear / S.025 / pH: x  Gluc: x / Ketone: NEGATIVE  / Bili: Negative / Urobili: 1.0 E.U./dL   Blood: x / Protein: NEGATIVE mg/dL / Nitrite: NEGATIVE   Leuk Esterase: NEGATIVE / RBC: > 10 /HPF / WBC 5-10 /HPF   Sq Epi: x / Non Sq Epi: 0-5 /HPF / Bacteria: Present /HPF              RADIOLOGY & ADDITIONAL STUDIES (The following images were personally reviewed):          A/p: 77yMale Interval Events:    Pt was given klonopin last night causing agitation. There was increased effort of breathing w/abdominal expansion leading to the decision to intubate. ET tube was adjusted after CXR showed tube @the maxx. PTT ON was 38 and hep gtt was increased to 17. Solumedrol 60 was given 1x but minimal secretions noted. Dobhoff placed. Donahue placed d/t low output at 150/5 hrs. Propfol gtt held to help renal perfusion. Urine output last at 30-35/hr.    Patient seen and examined at bedside. He is currently intubated and sedated on Precedex. He is awake, alert, and responds appropriately to commands. He shook his head when asked about pain.     Allergies    No Known Allergies    Intolerances    Vital Signs Last 24 Hrs  T(C): 36.6 (2022 04:55), Max: 38.3 (2022 23:00)  T(F): 97.9 (2022 04:55), Max: 101 (2022 23:00)  HR: 60 (2022 06:00) (58 - 95)  BP: 103/51 (2022 06:00) (101/52 - 196/84)  BP(mean): 74 (2022 06:00) (73 - 130)  RR: 14 (2022 06:00) (12 - 20)  SpO2: 100% (2022 06:00) (91% - 100%)    Parameters below as of 2022 06:00  Patient On (Oxygen Delivery Method): ventilator    O2 Concentration (%): 40    07-19 @ : @ 07:00  --------------------------------------------------------  IN: 5353 mL / OUT: 2125 mL / NET: 3228 mL    07-20 @ :  -   @ 06:51  --------------------------------------------------------  IN: 888 mL / OUT: 2595 mL / NET: -1707 mL       @ 07: @ 07:00  --------------------------------------------------------  IN: 5353 mL / OUT: 2125 mL / NET: 3228 mL     @ 07: @ 06:51  --------------------------------------------------------  IN: 888 mL / OUT: 2595 mL / NET: -1707 mL        Physical Exam:     Gen: Intubated and sedated on Precedex 9.5.  Neuro: Appropriately responds to commands. EOMI.  CV: Systolic ejection murmur  Pulm: Intubated. CTA b/l No w/r/r  Abd: Soft NT ND + BS  Ext: L LE edema. Onychomychosis  Vasc: L LE PT/DP biphasic.  Skin: Sparse bullae and eschar on L LE  MSK: No joint swelling  Psych: No signs of anxiety or depression      LABS:      CBC Full  -  ( 2022 06:14 )  WBC Count : 10.19 K/uL  RBC Count : 2.95 M/uL  Hemoglobin : 9.3 g/dL  Hematocrit : 27.3 %  Platelet Count - Automated : 157 K/uL  Mean Cell Volume : 92.5 fl  Mean Cell Hemoglobin : 31.5 pg  Mean Cell Hemoglobin Concentration : 34.1 gm/dL  Auto Neutrophil # : x  Auto Lymphocyte # : x  Auto Monocyte # : x  Auto Eosinophil # : x  Auto Basophil # : x  Auto Neutrophil % : x  Auto Lymphocyte % : x  Auto Monocyte % : x  Auto Eosinophil % : x  Auto Basophil % : x    07    134<L>  |  101  |  18  ----------------------------<  190<H>  3.8   |  22  |  1.03    Ca    8.6      2022 06:14  Phos  1.8     07-20  Mg     2.0     07-20      PTT - ( 2022 01:42 )  PTT:56.5 sec      Urinalysis Basic - ( 2022 08:42 )    Color: Yellow / Appearance: Clear / S.025 / pH: x  Gluc: x / Ketone: NEGATIVE  / Bili: Negative / Urobili: 1.0 E.U./dL   Blood: x / Protein: NEGATIVE mg/dL / Nitrite: NEGATIVE   Leuk Esterase: NEGATIVE / RBC: > 10 /HPF / WBC 5-10 /HPF   Sq Epi: x / Non Sq Epi: 0-5 /HPF / Bacteria: Present /HPF              RADIOLOGY & ADDITIONAL STUDIES (The following images were personally reviewed):          A/p: 77yMale Interval Events:    Pt was given klonopin last night causing agitation. There was increased effort of breathing w/abdominal expansion leading to the decision to intubate. ET tube was adjusted after CXR showed tube @the maxx. PTT ON was 38 and hep gtt was increased to 17. Solumedrol 60 was given 1x but minimal secretions noted. Dobhoff placed. Donahue placed d/t low output at 150/5 hrs. Propfol gtt held to help renal perfusion. Urine output last at 30-35/hr.    Patient seen and examined at bedside. He is currently intubated and sedated on Precedex. He is awake, alert, and responds appropriately to commands. He shook his head when asked about pain.     Allergies    No Known Allergies    Intolerances    Vital Signs Last 24 Hrs  T(C): 36.6 (2022 04:55), Max: 38.3 (2022 23:00)  T(F): 97.9 (2022 04:55), Max: 101 (2022 23:00)  HR: 60 (2022 06:00) (58 - 95)  BP: 103/51 (2022 06:00) (101/52 - 196/84)  BP(mean): 74 (2022 06:00) (73 - 130)  RR: 14 (2022 06:00) (12 - 20)  SpO2: 100% (2022 06:00) (91% - 100%)    Parameters below as of 2022 06:00  Patient On (Oxygen Delivery Method): ventilator    O2 Concentration (%): 40    07-19 @ : @ 07:00  --------------------------------------------------------  IN: 5353 mL / OUT: 2125 mL / NET: 3228 mL    07-20 @ :  -   @ 06:51  --------------------------------------------------------  IN: 888 mL / OUT: 2595 mL / NET: -1707 mL       @ 07: @ 07:00  --------------------------------------------------------  IN: 5353 mL / OUT: 2125 mL / NET: 3228 mL     @ 07: @ 06:51  --------------------------------------------------------  IN: 888 mL / OUT: 2595 mL / NET: -1707 mL        Physical Exam:     Gen: Sedated on Precedex. Arouses to name and sternal rub.  Neuro: Appropriately responds to commands. EOMI.  CV: Systolic ejection murmur  Pulm: Intubated. CTA b/l No w/r/r  Abd: Soft NT ND + BS  Ext: L LE edema. Onychomychosis  Vasc: L LE PT/DP biphasic.  Skin: Sparse bullae and eschar on L LE  MSK: No joint swelling  Psych: No signs of anxiety or depression      LABS:      CBC Full  -  ( 2022 06:14 )  WBC Count : 10.19 K/uL  RBC Count : 2.95 M/uL  Hemoglobin : 9.3 g/dL  Hematocrit : 27.3 %  Platelet Count - Automated : 157 K/uL  Mean Cell Volume : 92.5 fl  Mean Cell Hemoglobin : 31.5 pg  Mean Cell Hemoglobin Concentration : 34.1 gm/dL  Auto Neutrophil # : x  Auto Lymphocyte # : x  Auto Monocyte # : x  Auto Eosinophil # : x  Auto Basophil # : x  Auto Neutrophil % : x  Auto Lymphocyte % : x  Auto Monocyte % : x  Auto Eosinophil % : x  Auto Basophil % : x    07    134<L>  |  101  |  18  ----------------------------<  190<H>  3.8   |  22  |  1.03    Ca    8.6      2022 06:14  Phos  1.8     07-20  Mg     2.0     07-20      PTT - ( 2022 01:42 )  PTT:56.5 sec      Urinalysis Basic - ( 2022 08:42 )    Color: Yellow / Appearance: Clear / S.025 / pH: x  Gluc: x / Ketone: NEGATIVE  / Bili: Negative / Urobili: 1.0 E.U./dL   Blood: x / Protein: NEGATIVE mg/dL / Nitrite: NEGATIVE   Leuk Esterase: NEGATIVE / RBC: > 10 /HPF / WBC 5-10 /HPF   Sq Epi: x / Non Sq Epi: 0-5 /HPF / Bacteria: Present /HPF              RADIOLOGY & ADDITIONAL STUDIES (The following images were personally reviewed):          A/p: 77yMale

## 2022-07-22 LAB
ANION GAP SERPL CALC-SCNC: 14 MMOL/L — SIGNIFICANT CHANGE UP (ref 5–17)
APPEARANCE UR: ABNORMAL
APTT BLD: 53.5 SEC — HIGH (ref 27.5–35.5)
APTT BLD: 55.3 SEC — HIGH (ref 27.5–35.5)
APTT BLD: 65.1 SEC — HIGH (ref 27.5–35.5)
BACTERIA # UR AUTO: SIGNIFICANT CHANGE UP /HPF
BILIRUB UR-MCNC: NEGATIVE — SIGNIFICANT CHANGE UP
BUN SERPL-MCNC: 36 MG/DL — HIGH (ref 7–23)
CALCIUM SERPL-MCNC: 8.7 MG/DL — SIGNIFICANT CHANGE UP (ref 8.4–10.5)
CHLORIDE SERPL-SCNC: 98 MMOL/L — SIGNIFICANT CHANGE UP (ref 96–108)
CO2 SERPL-SCNC: 22 MMOL/L — SIGNIFICANT CHANGE UP (ref 22–31)
COLOR SPEC: YELLOW — SIGNIFICANT CHANGE UP
CREAT ?TM UR-MCNC: 77 MG/DL — SIGNIFICANT CHANGE UP
CREAT SERPL-MCNC: 1.3 MG/DL — SIGNIFICANT CHANGE UP (ref 0.5–1.3)
DIFF PNL FLD: NEGATIVE — SIGNIFICANT CHANGE UP
EGFR: 56 ML/MIN/1.73M2 — LOW
EPI CELLS # UR: SIGNIFICANT CHANGE UP /HPF (ref 0–5)
GLUCOSE BLDC GLUCOMTR-MCNC: 117 MG/DL — HIGH (ref 70–99)
GLUCOSE BLDC GLUCOMTR-MCNC: 149 MG/DL — HIGH (ref 70–99)
GLUCOSE BLDC GLUCOMTR-MCNC: 153 MG/DL — HIGH (ref 70–99)
GLUCOSE BLDC GLUCOMTR-MCNC: 189 MG/DL — HIGH (ref 70–99)
GLUCOSE SERPL-MCNC: 151 MG/DL — HIGH (ref 70–99)
GLUCOSE UR QL: NEGATIVE — SIGNIFICANT CHANGE UP
HCT VFR BLD CALC: 24.8 % — LOW (ref 39–50)
HGB BLD-MCNC: 8.2 G/DL — LOW (ref 13–17)
KETONES UR-MCNC: NEGATIVE — SIGNIFICANT CHANGE UP
LEUKOCYTE ESTERASE UR-ACNC: ABNORMAL
MAGNESIUM SERPL-MCNC: 2 MG/DL — SIGNIFICANT CHANGE UP (ref 1.6–2.6)
MCHC RBC-ENTMCNC: 31.8 PG — SIGNIFICANT CHANGE UP (ref 27–34)
MCHC RBC-ENTMCNC: 33.1 GM/DL — SIGNIFICANT CHANGE UP (ref 32–36)
MCV RBC AUTO: 96.1 FL — SIGNIFICANT CHANGE UP (ref 80–100)
NITRITE UR-MCNC: NEGATIVE — SIGNIFICANT CHANGE UP
NRBC # BLD: 0 /100 WBCS — SIGNIFICANT CHANGE UP (ref 0–0)
PH UR: 6 — SIGNIFICANT CHANGE UP (ref 5–8)
PHOSPHATE SERPL-MCNC: 2.6 MG/DL — SIGNIFICANT CHANGE UP (ref 2.5–4.5)
PLATELET # BLD AUTO: 197 K/UL — SIGNIFICANT CHANGE UP (ref 150–400)
POTASSIUM SERPL-MCNC: 3.4 MMOL/L — LOW (ref 3.5–5.3)
POTASSIUM SERPL-SCNC: 3.4 MMOL/L — LOW (ref 3.5–5.3)
PROT UR-MCNC: NEGATIVE MG/DL — SIGNIFICANT CHANGE UP
RBC # BLD: 2.58 M/UL — LOW (ref 4.2–5.8)
RBC # FLD: 13.5 % — SIGNIFICANT CHANGE UP (ref 10.3–14.5)
RBC CASTS # UR COMP ASSIST: < 5 /HPF — SIGNIFICANT CHANGE UP
SODIUM SERPL-SCNC: 134 MMOL/L — LOW (ref 135–145)
SODIUM UR-SCNC: 24 MMOL/L — SIGNIFICANT CHANGE UP
SP GR SPEC: 1.01 — SIGNIFICANT CHANGE UP (ref 1–1.03)
UROBILINOGEN FLD QL: 1 E.U./DL — SIGNIFICANT CHANGE UP
UUN UR-MCNC: 670 MG/DL — SIGNIFICANT CHANGE UP
WBC # BLD: 8.39 K/UL — SIGNIFICANT CHANGE UP (ref 3.8–10.5)
WBC # FLD AUTO: 8.39 K/UL — SIGNIFICANT CHANGE UP (ref 3.8–10.5)
WBC UR QL: > 10 /HPF

## 2022-07-22 PROCEDURE — 99232 SBSQ HOSP IP/OBS MODERATE 35: CPT | Mod: GC

## 2022-07-22 PROCEDURE — 99233 SBSQ HOSP IP/OBS HIGH 50: CPT | Mod: GC

## 2022-07-22 PROCEDURE — 74176 CT ABD & PELVIS W/O CONTRAST: CPT | Mod: 26

## 2022-07-22 RX ORDER — POTASSIUM CHLORIDE 20 MEQ
40 PACKET (EA) ORAL ONCE
Refills: 0 | Status: COMPLETED | OUTPATIENT
Start: 2022-07-22 | End: 2022-07-22

## 2022-07-22 RX ORDER — HYDROMORPHONE HYDROCHLORIDE 2 MG/ML
0.25 INJECTION INTRAMUSCULAR; INTRAVENOUS; SUBCUTANEOUS ONCE
Refills: 0 | Status: DISCONTINUED | OUTPATIENT
Start: 2022-07-22 | End: 2022-07-22

## 2022-07-22 RX ORDER — ACETAMINOPHEN 500 MG
1000 TABLET ORAL ONCE
Refills: 0 | Status: COMPLETED | OUTPATIENT
Start: 2022-07-22 | End: 2022-07-22

## 2022-07-22 RX ORDER — SODIUM,POTASSIUM PHOSPHATES 278-250MG
1 POWDER IN PACKET (EA) ORAL ONCE
Refills: 0 | Status: COMPLETED | OUTPATIENT
Start: 2022-07-22 | End: 2022-07-22

## 2022-07-22 RX ADMIN — Medication 1000 MILLIGRAM(S): at 16:33

## 2022-07-22 RX ADMIN — Medication 3 MILLILITER(S): at 09:58

## 2022-07-22 RX ADMIN — Medication 3 MILLILITER(S): at 01:21

## 2022-07-22 RX ADMIN — Medication 1 PACKET(S): at 08:36

## 2022-07-22 RX ADMIN — Medication 500 MILLIGRAM(S): at 11:42

## 2022-07-22 RX ADMIN — HYDROMORPHONE HYDROCHLORIDE 0.25 MILLIGRAM(S): 2 INJECTION INTRAMUSCULAR; INTRAVENOUS; SUBCUTANEOUS at 18:24

## 2022-07-22 RX ADMIN — CHLORHEXIDINE GLUCONATE 1 APPLICATION(S): 213 SOLUTION TOPICAL at 06:21

## 2022-07-22 RX ADMIN — Medication 3 MILLILITER(S): at 21:22

## 2022-07-22 RX ADMIN — Medication 5 MILLIGRAM(S): at 17:00

## 2022-07-22 RX ADMIN — Medication 37.5 MILLIGRAM(S): at 19:12

## 2022-07-22 RX ADMIN — Medication 40 MILLIEQUIVALENT(S): at 08:36

## 2022-07-22 RX ADMIN — Medication 37.5 MILLIGRAM(S): at 06:21

## 2022-07-22 RX ADMIN — HEPARIN SODIUM 17 UNIT(S)/HR: 5000 INJECTION INTRAVENOUS; SUBCUTANEOUS at 00:11

## 2022-07-22 RX ADMIN — Medication 81 MILLIGRAM(S): at 11:42

## 2022-07-22 RX ADMIN — Medication 2 MILLIGRAM(S): at 11:42

## 2022-07-22 RX ADMIN — Medication 2: at 12:14

## 2022-07-22 RX ADMIN — Medication 3 MILLILITER(S): at 04:31

## 2022-07-22 RX ADMIN — PANTOPRAZOLE SODIUM 40 MILLIGRAM(S): 20 TABLET, DELAYED RELEASE ORAL at 11:42

## 2022-07-22 RX ADMIN — DEXMEDETOMIDINE HYDROCHLORIDE IN 0.9% SODIUM CHLORIDE 4.25 MICROGRAM(S)/KG/HR: 4 INJECTION INTRAVENOUS at 07:51

## 2022-07-22 RX ADMIN — HEPARIN SODIUM 19 UNIT(S)/HR: 5000 INJECTION INTRAVENOUS; SUBCUTANEOUS at 14:01

## 2022-07-22 RX ADMIN — Medication 3 MILLILITER(S): at 17:32

## 2022-07-22 RX ADMIN — Medication 200 MILLIGRAM(S): at 19:12

## 2022-07-22 RX ADMIN — Medication 5 MILLIGRAM(S): at 11:43

## 2022-07-22 RX ADMIN — Medication 400 MILLIGRAM(S): at 14:27

## 2022-07-22 RX ADMIN — ATORVASTATIN CALCIUM 40 MILLIGRAM(S): 80 TABLET, FILM COATED ORAL at 21:22

## 2022-07-22 RX ADMIN — Medication 200 MILLIGRAM(S): at 06:20

## 2022-07-22 RX ADMIN — TAMSULOSIN HYDROCHLORIDE 0.4 MILLIGRAM(S): 0.4 CAPSULE ORAL at 21:22

## 2022-07-22 RX ADMIN — Medication 2: at 22:17

## 2022-07-22 RX ADMIN — Medication 5 MILLIGRAM(S): at 06:20

## 2022-07-22 RX ADMIN — Medication 5 MILLIGRAM(S): at 22:17

## 2022-07-22 RX ADMIN — Medication 300 MILLIGRAM(S): at 21:58

## 2022-07-22 NOTE — PROGRESS NOTE ADULT - ASSESSMENT
77y.o M Hx HTN, HLD, DM, TAVR, Pacemaker, Afib, COPD and PVD presents with LLE Critical limb ischemia. Now s/p SFA to TP trunk rGSV bypass (7/18) admitted to SICU for hemodynamic and respiratory monitoring.     Neuro: Agitation: continue Precedex. ZZofran PRN. Home Carbamazepine 200 bid, Trazadone 300 qhs, Melatonin 5 qhs, Venlofaxine 37.5 BID. Holding Klonipin 2/2 increase confusion.   CV: MAP >65, ASA restart 7/19 AM, HTN- 5 IV Metop q6h while NPO: chlorthalidone, nifidipine, losartan, propranolol, fenofibrate. HLD: restart atorvastatin, Afib: holding Eliquis, Hep gtt (goal 60-80), next PTT 6pm Echo: LVH, PAH (37mmHg)  Vasc: s/p LLE SFA to TP trunk bypass with ipsilateral rGSV (, IVF 2000, 500 albumin, . S/p LLE angiogram with TPA (7/15)  Pulm: Intubated 7/20>>Extubated 7/21, now on RA. Duoneb q4, s/p Solumedrol 60 x1   GI: NPO, Hx of hypertrig: Holding Icosapent. Passed dysphagia. Regular CC diet. Loperamide for diarrhea.  : TOV  ID: DC // Ancef x3. Scx (no growth 2 days)  Endo: mISS Holding home Allopurinol  PPx: Heparin gtt ( goal 60-80), SCD 2/2 PAD   Lines: PIVs // DC: Right rad david (7/18-7/19)  Wounds: LLE bypass dressing in place  PT/OT: Ordered 7/19  Dispo: SICU   77y.o M Hx HTN, HLD, DM, TAVR, Pacemaker, Afib, COPD and PVD presents with LLE Critical limb ischemia. Now s/p SFA to TP trunk rGSV bypass (7/18) admitted to SICU for hemodynamic and respiratory monitoring.     Neuro: Agitation: D/C precedex after scan. Zofran PRN. Home Carbamazepine 200 bid, Trazadone 300 qhs, Melatonin 5 qhs, Venlofaxine 37.5 BID. Holding Klonipin 2/2 increase confusion.   CV: MAP >65, ASA restart 7/19 AM, HTN- 5 IV Metop q6h while NPO: chlorthalidone, nifidipine, losartan, propranolol, fenofibrate. HLD: restart atorvastatin, Afib: holding Eliquis, Hep gtt (goal 60-80), next PTT 6pm Echo: LVH, PAH (37mmHg)  Vasc: s/p LLE SFA to TP trunk bypass with ipsilateral rGSV (, IVF 2000, 500 albumin, . S/p LLE angiogram with TPA (7/15)  Pulm: Intubated 7/20>>Extubated 7/21, now on RA. Duoneb q4, s/p Solumedrol 60 x1   GI: NPO, Hx of hypertrig: Holding Icosapent. Passed dysphagia. Regular CC diet. Loperamide for diarrhea.  : TOV  ID: DC // Ancef x3. Scx (no growth 2 days)  Endo: mISS Holding home Allopurinol  Heme: Hgb trending down. Continue to trend H/H. CT A/P pending.  PPx: Heparin gtt ( goal 60-80), SCD 2/2 PAD   Lines: PIVs // DC: Right rad david (7/18-7/19)  Wounds: LLE bypass dressing in place  PT/OT: Ordered 7/19  Dispo: SICU   77y.o M Hx HTN, HLD, DM, TAVR, Pacemaker, Afib, COPD and PVD presents with LLE Critical limb ischemia. Now s/p SFA to TP trunk rGSV bypass (7/18) admitted to SICU for hemodynamic and respiratory monitoring.     Neuro: Agitation: D/C precedex after scan. Zofran PRN. Home Carbamazepine 200 bid, Trazadone 300 qhs, Melatonin 5 qhs, Venlofaxine 37.5 BID. Holding Klonipin 2/2 increase confusion.   CV: MAP >65, ASA restart 7/19 AM, HTN- 5 IV Metop q6h while NPO: chlorthalidone, nifidipine, losartan, propranolol, fenofibrate. HLD: restart atorvastatin, Afib: holding Eliquis, Hep gtt (goal 60-80), next PTT 6pm Echo: LVH, PAH (37mmHg)  Vasc: s/p LLE SFA to TP trunk bypass with ipsilateral rGSV (, IVF 2000, 500 albumin, . S/p LLE angiogram with TPA (7/15)  Pulm: Intubated 7/20>>Extubated 7/21, now on RA. Duoneb q4, s/p Solumedrol 60 x1   GI: NPO, Hx of hypertrig: Holding Icosapent. Passed dysphagia. Regular CC diet. Loperamide for diarrhea.  : TOV  ID: DC // Ancef x3. Scx (no growth 2 days)  Endo: mISS Holding home Allopurinol  Heme: Hgb trending down. Continue to trend H/H. CT A/P initial read negative for retroperitoneal hematoma.   PPx: Heparin gtt ( goal 60-80), SCD 2/2 PAD   Lines: PIVs // DC: Right rad dvaid (7/18-7/19)  Wounds: LLE bypass dressing in place  PT/OT: Ordered 7/19  Dispo: SICU

## 2022-07-22 NOTE — PROGRESS NOTE ADULT - SUBJECTIVE AND OBJECTIVE BOX
Interval Events: Reviewed  Patient seen and examined at bedside.    Patient is a 77y old  Male who presents with a chief complaint of Critical Limb Ischemia (2022 07:24)  he is doing fine    PAST MEDICAL & SURGICAL HISTORY:  HTN (hypertension)      High cholesterol      DM (diabetes mellitus)      Popliteal aneurysm      Pacemaker      S/P TAVR (transcatheter aortic valve replacement)          MEDICATIONS:  Pulmonary:  albuterol/ipratropium for Nebulization 3 milliLiter(s) Nebulizer every 4 hours    Antimicrobials:    Anticoagulants:  aspirin  chewable 81 milliGRAM(s) Oral daily  heparin  Infusion 900 Unit(s)/Hr IV Continuous <Continuous>    Cardiac:  metoprolol tartrate Injectable 5 milliGRAM(s) IV Push every 6 hours  tamsulosin 0.4 milliGRAM(s) Oral at bedtime      Allergies    No Known Allergies    Intolerances        Vital Signs Last 24 Hrs  T(C): 36.4 (2022 17:13), Max: 37.3 (2022 10:00)  T(F): 97.6 (2022 17:13), Max: 99.2 (2022 10:00)  HR: 64 (2022 19:00) (60 - 83)  BP: 163/69 (2022 19:00) (93/49 - 168/74)  BP(mean): 99 (2022 19:00) (68 - 121)  RR: 24 (2022 19:00) (18 - 32)  SpO2: 88% (2022 19:00) (88% - 100%)    Parameters below as of 2022 19:00  Patient On (Oxygen Delivery Method): nasal cannula  O2 Flow (L/min): 2       @ :  -   @ 07:00  --------------------------------------------------------  IN: 641.5 mL / OUT: 835 mL / NET: -193.5 mL     @ 07: @ 19:34  --------------------------------------------------------  IN: 961 mL / OUT: 800 mL / NET: 161 mL          Review of Systems:   •	General: negative  •	Skin/Breast: negative  •	Ophthalmologic: negative  •	ENMT: negative  •	Respiratory and Thorax: negative  •	Cardiovascular: negative  •	Gastrointestinal: negative  •	Genitourinary: negative  •	Musculoskeletal: negative  •	Neurological: negative  •	Psychiatric: negative  •	Hematology/Lymphatics: negative  •	Endocrine: negative  •	Allergic/Immunologic: negative    Physical Exam:   • Constitutional:	refer to the dietion /Nutritionist note  • Eyes:	EOMI; PERRL; no drainage or redness  • ENMT:	No oral lesions; no gross abnormalities  • Neck	No bruits; no thyromegaly or nodules  • Breasts:	not examined  • Back:	No deformity or limitation of movement  • Respiratory:	Breath Sounds equal & clear to percussion & auscultation, no accessory muscle use  • Cardiovascular:	Regular rate & rhythm, normal S1, S2; no murmurs, gallops or rubs; no S3, S4  • Gastrointestinal:	Soft, non-tender, no hepatosplenomegaly, normal bowel sounds  • Genitourinary:	not examined  • Rectal: not examined  • Extremities:	No cyanosis, clubbing or edema  • Vascular:	Equal and normal pulses (carotid, femoral, dorsalis pedis)  • Neurologica:l	not examined  • Skin:	No lesions; no rash  • Lymph Nodes:	No lymphadedenopathy  • Musculoskeletal:	No joint pain, swelling or deformity; no limitation of movement        LABS:      CBC Full  -  ( 2022 05:43 )  WBC Count : 8.39 K/uL  RBC Count : 2.58 M/uL  Hemoglobin : 8.2 g/dL  Hematocrit : 24.8 %  Platelet Count - Automated : 197 K/uL  Mean Cell Volume : 96.1 fl  Mean Cell Hemoglobin : 31.8 pg  Mean Cell Hemoglobin Concentration : 33.1 gm/dL  Auto Neutrophil # : x  Auto Lymphocyte # : x  Auto Monocyte # : x  Auto Eosinophil # : x  Auto Basophil # : x  Auto Neutrophil % : x  Auto Lymphocyte % : x  Auto Monocyte % : x  Auto Eosinophil % : x  Auto Basophil % : x        134<L>  |  98  |  36<H>  ----------------------------<  151<H>  3.4<L>   |  22  |  1.30    Ca    8.7      2022 05:43  Phos  2.6       Mg     2.0           PTT - ( 2022 12:54 )  PTT:53.5 sec      Urinalysis Basic - ( 2022 12:56 )    Color: Yellow / Appearance: Hazy / S.010 / pH: x  Gluc: x / Ketone: NEGATIVE  / Bili: Negative / Urobili: 1.0 E.U./dL   Blood: x / Protein: NEGATIVE mg/dL / Nitrite: NEGATIVE   Leuk Esterase: Small / RBC: < 5 /HPF / WBC > 10 /HPF   Sq Epi: x / Non Sq Epi: 0-5 /HPF / Bacteria: None /HPF                  RADIOLOGY & ADDITIONAL STUDIES (The following images were personally reviewed):

## 2022-07-22 NOTE — PROGRESS NOTE ADULT - SUBJECTIVE AND OBJECTIVE BOX
Interval Events: Overnight patient had 1 BM and passed a TOV    Patient seen and examined at bedside.      Allergies    No Known Allergies    Intolerances        Vital Signs Last 24 Hrs  T(C): 36.8 (2022 05:23), Max: 37.1 (2022 14:00)  T(F): 98.2 (2022 05:23), Max: 98.8 (2022 14:00)  HR: 60 (2022 06:00) (60 - 70)  BP: 125/59 (2022 06:00) (93/49 - 139/63)  BP(mean): 85 (2022 06:00) (64 - 91)  RR: 28 (2022 06:00) (16 - 50)  SpO2: 100% (2022 06:00) (92% - 100%)    Parameters below as of 2022 06:00  Patient On (Oxygen Delivery Method): nasal cannula  O2 Flow (L/min): 2       @ 07:  -   @ 07:00  --------------------------------------------------------  IN: 623.5 mL / OUT: 835 mL / NET: -211.5 mL       @ 07:01  -  22 @ 07:00  --------------------------------------------------------  IN: 623.5 mL / OUT: 835 mL / NET: -211.5 mL        Physical Exam:     Gen: NAD well nourished  Neuro: A&OX3 No deficits  CV: RRR. Systolic Ejection Murmur  Pulm: CTA b/l No w/r/r  Abd: Soft NT ND + BS  Ext: No C/C/E   Vasc: + DP b/l   Skin: no rashes noted  MSK: No joint swelling  Psych: No signs of anxiety or depression      LABS:      CBC Full  -  ( 2022 05:43 )  WBC Count : 8.39 K/uL  RBC Count : 2.58 M/uL  Hemoglobin : 8.2 g/dL  Hematocrit : 24.8 %  Platelet Count - Automated : 197 K/uL  Mean Cell Volume : 96.1 fl  Mean Cell Hemoglobin : 31.8 pg  Mean Cell Hemoglobin Concentration : 33.1 gm/dL  Auto Neutrophil # : x  Auto Lymphocyte # : x  Auto Monocyte # : x  Auto Eosinophil # : x  Auto Basophil # : x  Auto Neutrophil % : x  Auto Lymphocyte % : x  Auto Monocyte % : x  Auto Eosinophil % : x  Auto Basophil % : x    07-    134<L>  |  98  |  36<H>  ----------------------------<  151<H>  3.4<L>   |  22  |  1.30    Ca    8.7      2022 05:43  Phos  2.6     07-  Mg     2.0     07-22      PTT - ( 2022 05:43 )  PTT:55.3 sec      Urinalysis Basic - ( 2022 08:42 )    Color: Yellow / Appearance: Clear / S.025 / pH: x  Gluc: x / Ketone: NEGATIVE  / Bili: Negative / Urobili: 1.0 E.U./dL   Blood: x / Protein: NEGATIVE mg/dL / Nitrite: NEGATIVE   Leuk Esterase: NEGATIVE / RBC: > 10 /HPF / WBC 5-10 /HPF   Sq Epi: x / Non Sq Epi: 0-5 /HPF / Bacteria: Present /HPF              RADIOLOGY & ADDITIONAL STUDIES (The following images were personally reviewed):          A/p: 77yMale Interval Events: Overnight patient had 1 BM and passed a TOV    Patient seen and examined at bedside. Wife noted he had a restless night but otherwise did well. Patient denied any chest pain/SOB/F/C/N/V. Doing better overall today.      Allergies    No Known Allergies    Intolerances        Vital Signs Last 24 Hrs  T(C): 36.8 (2022 05:23), Max: 37.1 (2022 14:00)  T(F): 98.2 (2022 05:23), Max: 98.8 (2022 14:00)  HR: 60 (2022 06:00) (60 - 70)  BP: 125/59 (2022 06:00) (93/49 - 139/63)  BP(mean): 85 (2022 06:00) (64 - 91)  RR: 28 (2022 06:00) (16 - 50)  SpO2: 100% (2022 06:00) (92% - 100%)    Parameters below as of 2022 06:00  Patient On (Oxygen Delivery Method): nasal cannula  O2 Flow (L/min): 2       @ 07:  -   @ 07:00  --------------------------------------------------------  IN: 623.5 mL / OUT: 835 mL / NET: -211.5 mL       @ :  -   @ 07:00  --------------------------------------------------------  IN: 623.5 mL / OUT: 835 mL / NET: -211.5 mL        Physical Exam:     Gen: Sedated on Precedex. Arouses to name and sternal rub.  Neuro: Appropriately responds to commands. EOMI.  CV: Systolic ejection murmur  Pulm: Intubated. CTA b/l No w/r/r  Abd: Soft NT ND + BS  Ext: L LE edema. Onychomychosis  Vasc: L LE PT/DP biphasic.  Skin: Sparse bullae and eschar on L LE  MSK: No joint swelling  Psych: No signs of anxiety or depression      LABS:      CBC Full  -  ( 2022 05:43 )  WBC Count : 8.39 K/uL  RBC Count : 2.58 M/uL  Hemoglobin : 8.2 g/dL  Hematocrit : 24.8 %  Platelet Count - Automated : 197 K/uL  Mean Cell Volume : 96.1 fl  Mean Cell Hemoglobin : 31.8 pg  Mean Cell Hemoglobin Concentration : 33.1 gm/dL  Auto Neutrophil # : x  Auto Lymphocyte # : x  Auto Monocyte # : x  Auto Eosinophil # : x  Auto Basophil # : x  Auto Neutrophil % : x  Auto Lymphocyte % : x  Auto Monocyte % : x  Auto Eosinophil % : x  Auto Basophil % : x    07-    134<L>  |  98  |  36<H>  ----------------------------<  151<H>  3.4<L>   |  22  |  1.30    Ca    8.7      2022 05:43  Phos  2.6     07-  Mg     2.0     07-22      PTT - ( 2022 05:43 )  PTT:55.3 sec      Urinalysis Basic - ( 2022 08:42 )    Color: Yellow / Appearance: Clear / S.025 / pH: x  Gluc: x / Ketone: NEGATIVE  / Bili: Negative / Urobili: 1.0 E.U./dL   Blood: x / Protein: NEGATIVE mg/dL / Nitrite: NEGATIVE   Leuk Esterase: NEGATIVE / RBC: > 10 /HPF / WBC 5-10 /HPF   Sq Epi: x / Non Sq Epi: 0-5 /HPF / Bacteria: Present /HPF              RADIOLOGY & ADDITIONAL STUDIES (The following images were personally reviewed):          A/p: 77yMale

## 2022-07-22 NOTE — PROGRESS NOTE ADULT - ASSESSMENT
77y.o M Hx HTN, HLD, DM, Pacemaker, Afib and PVD presents with LLE Critical limb ischemia. Now s/p SFA to TP trunk rGSV bypass (7/18) admitted to SICU for hemodynamic and respiratory monitoring.     Plan:   Wean off precedex at night  Get OOB and Ambulate  Restart HTN meds as able  Continue heparin gtt  Rest of care per SICU team

## 2022-07-22 NOTE — PROGRESS NOTE ADULT - SUBJECTIVE AND OBJECTIVE BOX
24 hr events:ON: PTT 70- no change, PTOV. No agitation on Precedex, 1BM      SUBJECTIVE: Patient seen at bedside in no acute distress, sleeping but responsive. No CP, SOB, fevers or chills. Pain controlled.     Vital Signs Last 24 Hrs  T(C): 37.3 (2022 10:00), Max: 37.3 (2022 10:00)  T(F): 99.2 (2022 10:00), Max: 99.2 (2022 10:00)  HR: 71 (2022 12:00) (60 - 83)  BP: 103/55 (2022 12:00) (93/49 - 151/82)  BP(mean): 68 (2022 12:00) (68 - 105)  RR: 30 (2022 12:00) (18 - 32)  SpO2: 99% (2022 12:00) (92% - 100%)    Parameters below as of 2022 12:00  Patient On (Oxygen Delivery Method): room air        Physical Exam:  General: NAD, resting comfortably in bed  Pulm: no respiratory distress  Abd: soft, non distended no rebound or guarding  Extrem: LLE bypass dressing in place, moderate saturation sup>inf, leg with moderate mottling, cool to touch  Pulses: L: Biphasic DP + mono/biphasic PT,   Neuro: Responsive to commands , sensation diminished in LLE but improving  Lines/drains/tubes:      Lines/drains/tubes:    I&O's Summary    2022 07:01  -  2022 07:00  --------------------------------------------------------  IN: 641.5 mL / OUT: 835 mL / NET: -193.5 mL    2022 07:01  -  2022 13:25  --------------------------------------------------------  IN: 349 mL / OUT: 0 mL / NET: 349 mL        LABS:                        8.2    8.39  )-----------( 197      ( 2022 05:43 )             24.8         134<L>  |  98  |  36<H>  ----------------------------<  151<H>  3.4<L>   |  22  |  1.30    Ca    8.7      2022 05:43  Phos  2.6       Mg     2.0           PTT - ( 2022 12:54 )  PTT:53.5 sec  Urinalysis Basic - ( 2022 12:56 )    Color: Yellow / Appearance: Hazy / S.010 / pH: x  Gluc: x / Ketone: NEGATIVE  / Bili: Negative / Urobili: 1.0 E.U./dL   Blood: x / Protein: NEGATIVE mg/dL / Nitrite: NEGATIVE   Leuk Esterase: Small / RBC: x / WBC x   Sq Epi: x / Non Sq Epi: x / Bacteria: x      CAPILLARY BLOOD GLUCOSE      POCT Blood Glucose.: 189 mg/dL (2022 12:04)  POCT Blood Glucose.: 149 mg/dL (2022 05:51)  POCT Blood Glucose.: 128 mg/dL (2022 21:49)  POCT Blood Glucose.: 139 mg/dL (2022 17:59)        RADIOLOGY & ADDITIONAL STUDIES:

## 2022-07-23 LAB
ALBUMIN SERPL ELPH-MCNC: 3.4 G/DL — SIGNIFICANT CHANGE UP (ref 3.3–5)
ALP SERPL-CCNC: 65 U/L — SIGNIFICANT CHANGE UP (ref 40–120)
ALT FLD-CCNC: 62 U/L — HIGH (ref 10–45)
ANION GAP SERPL CALC-SCNC: 15 MMOL/L — SIGNIFICANT CHANGE UP (ref 5–17)
APTT BLD: 63.3 SEC — HIGH (ref 27.5–35.5)
APTT BLD: 64.7 SEC — HIGH (ref 27.5–35.5)
AST SERPL-CCNC: 128 U/L — HIGH (ref 10–40)
BILIRUB DIRECT SERPL-MCNC: 0.5 MG/DL — HIGH (ref 0–0.3)
BILIRUB INDIRECT FLD-MCNC: 0.5 MG/DL — SIGNIFICANT CHANGE UP (ref 0.2–1)
BILIRUB SERPL-MCNC: 1 MG/DL — SIGNIFICANT CHANGE UP (ref 0.2–1.2)
BUN SERPL-MCNC: 29 MG/DL — HIGH (ref 7–23)
CALCIUM SERPL-MCNC: 9.1 MG/DL — SIGNIFICANT CHANGE UP (ref 8.4–10.5)
CHLORIDE SERPL-SCNC: 98 MMOL/L — SIGNIFICANT CHANGE UP (ref 96–108)
CO2 SERPL-SCNC: 22 MMOL/L — SIGNIFICANT CHANGE UP (ref 22–31)
CREAT SERPL-MCNC: 1.18 MG/DL — SIGNIFICANT CHANGE UP (ref 0.5–1.3)
EGFR: 63 ML/MIN/1.73M2 — SIGNIFICANT CHANGE UP
GLUCOSE BLDC GLUCOMTR-MCNC: 144 MG/DL — HIGH (ref 70–99)
GLUCOSE BLDC GLUCOMTR-MCNC: 145 MG/DL — HIGH (ref 70–99)
GLUCOSE BLDC GLUCOMTR-MCNC: 162 MG/DL — HIGH (ref 70–99)
GLUCOSE BLDC GLUCOMTR-MCNC: 163 MG/DL — HIGH (ref 70–99)
GLUCOSE SERPL-MCNC: 131 MG/DL — HIGH (ref 70–99)
HCT VFR BLD CALC: 27.3 % — LOW (ref 39–50)
HGB BLD-MCNC: 9.5 G/DL — LOW (ref 13–17)
INR BLD: 1.26 — HIGH (ref 0.88–1.16)
MAGNESIUM SERPL-MCNC: 2 MG/DL — SIGNIFICANT CHANGE UP (ref 1.6–2.6)
MCHC RBC-ENTMCNC: 32.3 PG — SIGNIFICANT CHANGE UP (ref 27–34)
MCHC RBC-ENTMCNC: 34.8 GM/DL — SIGNIFICANT CHANGE UP (ref 32–36)
MCV RBC AUTO: 92.9 FL — SIGNIFICANT CHANGE UP (ref 80–100)
NRBC # BLD: 0 /100 WBCS — SIGNIFICANT CHANGE UP (ref 0–0)
PHOSPHATE SERPL-MCNC: 2.3 MG/DL — LOW (ref 2.5–4.5)
PLATELET # BLD AUTO: 264 K/UL — SIGNIFICANT CHANGE UP (ref 150–400)
POTASSIUM SERPL-MCNC: 3.8 MMOL/L — SIGNIFICANT CHANGE UP (ref 3.5–5.3)
POTASSIUM SERPL-SCNC: 3.8 MMOL/L — SIGNIFICANT CHANGE UP (ref 3.5–5.3)
PROT SERPL-MCNC: 7 G/DL — SIGNIFICANT CHANGE UP (ref 6–8.3)
PROTHROM AB SERPL-ACNC: 15 SEC — HIGH (ref 10.5–13.4)
RBC # BLD: 2.94 M/UL — LOW (ref 4.2–5.8)
RBC # FLD: 13.7 % — SIGNIFICANT CHANGE UP (ref 10.3–14.5)
SODIUM SERPL-SCNC: 135 MMOL/L — SIGNIFICANT CHANGE UP (ref 135–145)
WBC # BLD: 9.72 K/UL — SIGNIFICANT CHANGE UP (ref 3.8–10.5)
WBC # FLD AUTO: 9.72 K/UL — SIGNIFICANT CHANGE UP (ref 3.8–10.5)

## 2022-07-23 PROCEDURE — 99233 SBSQ HOSP IP/OBS HIGH 50: CPT | Mod: GC

## 2022-07-23 PROCEDURE — 71045 X-RAY EXAM CHEST 1 VIEW: CPT | Mod: 26

## 2022-07-23 RX ORDER — ALBUTEROL 90 UG/1
2.5 AEROSOL, METERED ORAL THREE TIMES A DAY
Refills: 0 | Status: DISCONTINUED | OUTPATIENT
Start: 2022-07-23 | End: 2022-07-23

## 2022-07-23 RX ORDER — IPRATROPIUM/ALBUTEROL SULFATE 18-103MCG
3 AEROSOL WITH ADAPTER (GRAM) INHALATION EVERY 6 HOURS
Refills: 0 | Status: DISCONTINUED | OUTPATIENT
Start: 2022-07-23 | End: 2022-07-25

## 2022-07-23 RX ORDER — POTASSIUM PHOSPHATE, MONOBASIC POTASSIUM PHOSPHATE, DIBASIC 236; 224 MG/ML; MG/ML
15 INJECTION, SOLUTION INTRAVENOUS ONCE
Refills: 0 | Status: COMPLETED | OUTPATIENT
Start: 2022-07-23 | End: 2022-07-23

## 2022-07-23 RX ORDER — APIXABAN 2.5 MG/1
5 TABLET, FILM COATED ORAL EVERY 12 HOURS
Refills: 0 | Status: DISCONTINUED | OUTPATIENT
Start: 2022-07-23 | End: 2022-07-25

## 2022-07-23 RX ORDER — PANTOPRAZOLE SODIUM 20 MG/1
40 TABLET, DELAYED RELEASE ORAL
Refills: 0 | Status: DISCONTINUED | OUTPATIENT
Start: 2022-07-23 | End: 2022-07-25

## 2022-07-23 RX ORDER — NIFEDIPINE 30 MG
60 TABLET, EXTENDED RELEASE 24 HR ORAL EVERY 24 HOURS
Refills: 0 | Status: DISCONTINUED | OUTPATIENT
Start: 2022-07-23 | End: 2022-07-25

## 2022-07-23 RX ORDER — ALLOPURINOL 300 MG
100 TABLET ORAL EVERY 24 HOURS
Refills: 0 | Status: DISCONTINUED | OUTPATIENT
Start: 2022-07-23 | End: 2022-07-25

## 2022-07-23 RX ADMIN — PANTOPRAZOLE SODIUM 40 MILLIGRAM(S): 20 TABLET, DELAYED RELEASE ORAL at 11:11

## 2022-07-23 RX ADMIN — Medication 2: at 21:37

## 2022-07-23 RX ADMIN — CHLORHEXIDINE GLUCONATE 1 APPLICATION(S): 213 SOLUTION TOPICAL at 06:13

## 2022-07-23 RX ADMIN — APIXABAN 5 MILLIGRAM(S): 2.5 TABLET, FILM COATED ORAL at 18:00

## 2022-07-23 RX ADMIN — Medication 3 MILLILITER(S): at 02:50

## 2022-07-23 RX ADMIN — Medication 2: at 11:37

## 2022-07-23 RX ADMIN — Medication 3 MILLILITER(S): at 21:35

## 2022-07-23 RX ADMIN — Medication 3 MILLILITER(S): at 15:54

## 2022-07-23 RX ADMIN — APIXABAN 5 MILLIGRAM(S): 2.5 TABLET, FILM COATED ORAL at 11:12

## 2022-07-23 RX ADMIN — Medication 5 MILLIGRAM(S): at 05:21

## 2022-07-23 RX ADMIN — Medication 200 MILLIGRAM(S): at 18:00

## 2022-07-23 RX ADMIN — Medication 500 MILLIGRAM(S): at 11:37

## 2022-07-23 RX ADMIN — Medication 100 MILLIGRAM(S): at 11:12

## 2022-07-23 RX ADMIN — Medication 37.5 MILLIGRAM(S): at 05:22

## 2022-07-23 RX ADMIN — Medication 81 MILLIGRAM(S): at 11:12

## 2022-07-23 RX ADMIN — Medication 60 MILLIGRAM(S): at 11:12

## 2022-07-23 RX ADMIN — ATORVASTATIN CALCIUM 40 MILLIGRAM(S): 80 TABLET, FILM COATED ORAL at 21:34

## 2022-07-23 RX ADMIN — Medication 3 MILLILITER(S): at 05:22

## 2022-07-23 RX ADMIN — POTASSIUM PHOSPHATE, MONOBASIC POTASSIUM PHOSPHATE, DIBASIC 62.5 MILLIMOLE(S): 236; 224 INJECTION, SOLUTION INTRAVENOUS at 05:26

## 2022-07-23 RX ADMIN — TAMSULOSIN HYDROCHLORIDE 0.4 MILLIGRAM(S): 0.4 CAPSULE ORAL at 21:34

## 2022-07-23 RX ADMIN — Medication 37.5 MILLIGRAM(S): at 18:00

## 2022-07-23 RX ADMIN — Medication 300 MILLIGRAM(S): at 21:35

## 2022-07-23 RX ADMIN — Medication 2 MILLIGRAM(S): at 11:12

## 2022-07-23 RX ADMIN — Medication 200 MILLIGRAM(S): at 05:22

## 2022-07-23 NOTE — PROGRESS NOTE ADULT - SUBJECTIVE AND OBJECTIVE BOX
Subjective:     - Feels well  - Leg remains insensate with minimal strength below the knee.   - Did well walking yesterday.   - Would like to start working towards rehabilitation facility  - No particular concerns this morning.     Interval events:   - Creatinine improved  - CT A/P Negative  - Heparin gtt therapeutic     Vital Signs Last 24 Hrs  T(C): 36.4 (23 Jul 2022 09:00), Max: 37.3 (22 Jul 2022 10:00)  T(F): 97.6 (23 Jul 2022 09:00), Max: 99.2 (22 Jul 2022 10:00)  HR: 63 (23 Jul 2022 09:01) (60 - 72)  BP: 162/71 (23 Jul 2022 09:01) (103/55 - 178/81)  BP(mean): 102 (23 Jul 2022 09:01) (68 - 121)  RR: 22 (23 Jul 2022 09:01) (16 - 30)  SpO2: 94% (23 Jul 2022 09:01) (88% - 99%)    Parameters below as of 23 Jul 2022 09:01  Patient On (Oxygen Delivery Method): room air      I&O's Summary    22 Jul 2022 07:01  -  23 Jul 2022 07:00  --------------------------------------------------------  IN: 1696.5 mL / OUT: 1800 mL / NET: -103.5 mL    23 Jul 2022 07:01  -  23 Jul 2022 09:25  --------------------------------------------------------  IN: 81.5 mL / OUT: 0 mL / NET: 81.5 mL        Physical Exam:  General:  Pulmonary:  Cardiovascular:  Abdominal:  Extremities:  Pulses:   Right:                                                                          Left:  FEM [ ]2+ [ ]1+ [ ]doppler                                             FEM [ ]2+ [ ]1+ [ ]doppler    POP [ ]2+ [ ]1+ [ ]doppler                                             POP [ ]2+ [ ]1+ [ ]doppler    DP [ ]2+ [ ]1+ [ ]doppler                                                DP [ ]2+ [ ]1+ [ ]doppler  PT[ ]2+ [ ]1+ [ ]doppler                                                  PT [ ]2+ [ ]1+ [ ]doppler      LABS:                        9.5    9.72  )-----------( 264      ( 23 Jul 2022 03:05 )             27.3     07-23    135  |  98  |  29<H>  ----------------------------<  131<H>  3.8   |  22  |  1.18    Ca    9.1      23 Jul 2022 03:05  Phos  2.3     07-23  Mg     2.0     07-23    TPro  7.0  /  Alb  3.4  /  TBili  1.0  /  DBili  0.5<H>  /  AST  128<H>  /  ALT  62<H>  /  AlkPhos  65  07-23    PT/INR - ( 23 Jul 2022 03:05 )   PT: 15.0 sec;   INR: 1.26          PTT - ( 23 Jul 2022 03:05 )  PTT:63.3 sec    Radiology and Additional Studies:    A/P: 77y YO Male      Neuro:     Card:     Resp:     GI:     :     Heme:     ID:    Endo:     renal:     Vascular     Dispo:     DVT Ppx:      Subjective:     - Feels well  - Leg remains insensate with minimal strength below the knee.   - Did well walking yesterday.   - Would like to start working towards rehabilitation facility  - No particular concerns this morning.     Interval events:   - Creatinine improved  - CT A/P Negative, w/o RP hematoma. HGB 9.5 this morning from 8.   - Heparin gtt therapeutic   - Eliquis restarted this AM as well as propranolol and nifedipine with SBPs >160s, now that TANIKA has resolved.   - UOP ~1L o/n  - Last BM 7/22    Vital Signs Last 24 Hrs  T(C): 36.4 (23 Jul 2022 09:00), Max: 37.3 (22 Jul 2022 10:00)  T(F): 97.6 (23 Jul 2022 09:00), Max: 99.2 (22 Jul 2022 10:00)  HR: 63 (23 Jul 2022 09:01) (60 - 72)  BP: 162/71 (23 Jul 2022 09:01) (103/55 - 178/81)  BP(mean): 102 (23 Jul 2022 09:01) (68 - 121)  RR: 22 (23 Jul 2022 09:01) (16 - 30)  SpO2: 94% (23 Jul 2022 09:01) (88% - 99%)    Physical Exam:  General: NAD, resting comfortably in bed  Pulm: no respiratory distress. On room air.   Abd: soft, non distended no rebound or guarding  Extrem: LLE bypass saphenectomy incision w/ staples, nylon sutures w/o dressing, to air. C/D/I. Superficial desquamation at medial mid calf, appears dry. leg with moderate mottling, cool to touch. Mepilex applied to heel. No visible pressure injuries. Leg externally rotated with knee flexed somewhat.   Pulses: L: Biphasic AT/ High DP + mono/biphasic PT. R: Biphasic DP/PT  Neuro: Responsive to commands , sensation diminished in LLE distal to mid calf  Lines/drains/tubes: Rectal tube in place. No blas.     I&O's Summary    22 Jul 2022 07:01  -  23 Jul 2022 07:00  --------------------------------------------------------  IN: 1696.5 mL / OUT: 1800 mL / NET: -103.5 mL    23 Jul 2022 07:01  -  23 Jul 2022 09:25  --------------------------------------------------------  IN: 81.5 mL / OUT: 0 mL / NET: 81.5 mL        LABS:                        9.5    9.72  )-----------( 264      ( 23 Jul 2022 03:05 )             27.3     07-23    135  |  98  |  29<H>  ----------------------------<  131<H>  3.8   |  22  |  1.18    Ca    9.1      23 Jul 2022 03:05  Phos  2.3     07-23  Mg     2.0     07-23    TPro  7.0  /  Alb  3.4  /  TBili  1.0  /  DBili  0.5<H>  /  AST  128<H>  /  ALT  62<H>  /  AlkPhos  65  07-23    PT/INR - ( 23 Jul 2022 03:05 )   PT: 15.0 sec;   INR: 1.26          PTT - ( 23 Jul 2022 03:05 )  PTT:63.3 sec    Radiology and Additional Studies:    A/P: 77y.o M Hx HTN, HLD, DM, Pacemaker, Afib and PVD presents with LLE Critical limb ischemia. Now s/p SFA to TP trunk rGSV bypass (7/18) admitted to SICU for hemodynamic and respiratory monitoring.     Plan:   - Step down to floor  - Agree with resuming DOAC, antihypertensives  - Please encourage patient to frequently reposition his leg to avoid pressure injuries and to work on range of motion at left knee and ankle. PT/OT encouraged.   - Rest of care per SICU.

## 2022-07-23 NOTE — PROGRESS NOTE ADULT - ASSESSMENT
Assessment: 77y.o M Hx HTN, HLD, DM, TAVR, Pacemaker, Afib, COPD and PVD presents with LLE Critical limb ischemia. Now s/p SFA to TP trunk rGSV bypass (7/18) admitted to SICU for hemodynamic and respiratory monitoring. Improved hemodynamics and mental status, plan to start eliquis and transfer to telemetry floor today.     Plan:   Neuro: Zofran PRN. Home Carbamazepine 200 bid, Trazadone 300 qhs, Melatonin 5 qhs, Venlofaxine 37.5 BID. Holding Klonipin 2/2 increase confusion.   CV: MAP >65, ASA restart 7/19 AM, HTN- Nifedipine, Propranalol; chlorthalidone, losartan, fenofibrate. HLD: restart atorvastatin, Afib: Eliquis; Echo: LVH, PAH (37mmHg)  Vasc: s/p LLE SFA to TP trunk bypass with ipsilateral rGSV (, IVF 2000, 500 albumin, . S/p LLE angiogram with TPA (7/15)  Pulm: Intubated 7/20>>Extubated 7/21, now on RA. Duoneb q4, s/p Solumedrol 60 x1   GI: NPO, Hx of hypertrig: Holding Icosapent. Passed dysphagia. Regular CC diet. Restart home inhalers.Loperamide for diarrhea.  : Voids; Allopurinol  ID: DC // Ancef x3  Endo: mISS Holding home Allopurinol  PPx: Eliquis, no SCDs 2/2 PAD   Lines: PIVs // DC: Right rad david (7/18-7/19)  Wounds: LLE bypass dressing in place  PT/OT: Ordered 7/19  Dispo: SDU

## 2022-07-23 NOTE — PROGRESS NOTE ADULT - SUBJECTIVE AND OBJECTIVE BOX
24 Hour Events: Hemoglobin decreased yesterday, with bruising around R hip underwent non-con CT A/P without evidence of spontaneous bleed nor hematoma. Mental status improved, orientated without agitation. Restarted home BP medications this AM.       PAST MEDICAL & SURGICAL HISTORY:  HTN (hypertension)  High cholesterol  DM (diabetes mellitus)  Popliteal aneurysm  Pacemaker  S/P TAVR (transcatheter aortic valve replacement)      Allergies  No Known Allergies      MEDICATIONS  (STANDING):  albuterol/ipratropium for Nebulization 3 milliLiter(s) Nebulizer every 4 hours  allopurinol 100 milliGRAM(s) Oral every 24 hours  apixaban 5 milliGRAM(s) Oral every 12 hours  ascorbic acid 500 milliGRAM(s) Oral daily  aspirin  chewable 81 milliGRAM(s) Oral daily  atorvastatin 40 milliGRAM(s) Oral at bedtime  carBAMazepine 200 milliGRAM(s) Oral two times a day  chlorhexidine 2% Cloths 1 Application(s) Topical <User Schedule>  dextrose 5%. 1000 milliLiter(s) (100 mL/Hr) IV Continuous <Continuous>  dextrose 5%. 1000 milliLiter(s) (50 mL/Hr) IV Continuous <Continuous>  dextrose 5%. 1000 milliLiter(s) (50 mL/Hr) IV Continuous <Continuous>  dextrose 5%. 1000 milliLiter(s) (100 mL/Hr) IV Continuous <Continuous>  dextrose 50% Injectable 25 Gram(s) IV Push once  dextrose 50% Injectable 12.5 Gram(s) IV Push once  dextrose 50% Injectable 25 Gram(s) IV Push once  dextrose 50% Injectable 25 Gram(s) IV Push once  dextrose 50% Injectable 12.5 Gram(s) IV Push once  dextrose 50% Injectable 25 Gram(s) IV Push once  glucagon  Injectable 1 milliGRAM(s) IntraMuscular once  glucagon  Injectable 1 milliGRAM(s) IntraMuscular once  insulin lispro (ADMELOG) corrective regimen sliding scale   SubCutaneous Before meals and at bedtime  loperamide 2 milliGRAM(s) Oral daily  NIFEdipine XL 60 milliGRAM(s) Oral every 24 hours  pantoprazole    Tablet 40 milliGRAM(s) Oral before breakfast  propranolol 10 milliGRAM(s) Oral three times a day  tamsulosin 0.4 milliGRAM(s) Oral at bedtime  traZODone 300 milliGRAM(s) Oral at bedtime  venlafaxine XR. 37.5 milliGRAM(s) Oral every 12 hours    MEDICATIONS  (PRN):  dextrose Oral Gel 15 Gram(s) Oral once PRN Blood Glucose LESS THAN 70 milliGRAM(s)/deciliter  dextrose Oral Gel 15 Gram(s) Oral once PRN Blood Glucose LESS THAN 70 milliGRAM(s)/deciliter      Physical Exam:   General: Elderly male, appears stated age  Psych: Calm in bed, orientated to situation  Neuro: Grossly intact bilaterally   HEENT: Normocephalic, atraumatic, trachea midline, no JVD   Chest: Equal rise and fall of chest   Heart: Regular S1/S2, no murmurs rubs or gallops   Lungs: Unlabored breathing on RA; Clear to auscultation bilaterally, no adventitious sounds   Abdomen: Soft, non-distended, normoactive bowel sounds throughout, no tenderness to palpation in all 4 quadrants   Upper Extremities: No edema, freely mobile bilaterally   Lower Extremities: No edema, SCDs in place, feet warm bilaterally; RLE with biphasic DP/PT pulses, LLE with gema calf, sensation intact, monophasic PT, biphasic DP  Skin: Warm, non-diaphoretic throughout       Labs:                         9.5    9.72  )-----------( 264      ( 2022 03:05 )             27.3     07-23    135  |  98  |  29<H>  ----------------------------<  131<H>  3.8   |  22  |  1.18    Ca    9.1      2022 03:05  Phos  2.3     07-  Mg     2.0     07-23    TPro  7.0  /  Alb  3.4  /  TBili  1.0  /  DBili  0.5<H>  /  AST  128<H>  /  ALT  62<H>  /  AlkPhos  65  07-23    CAPILLARY BLOOD GLUCOSE  POCT Blood Glucose.: 145 mg/dL (2022 06:06)  POCT Blood Glucose.: 153 mg/dL (2022 22:05)  POCT Blood Glucose.: 117 mg/dL (2022 16:36)  POCT Blood Glucose.: 189 mg/dL (2022 12:04)    PT/INR - ( 2022 03:05 )   PT: 15.0 sec;   INR: 1.26     PTT - ( 2022 08:59 )  PTT:64.7 sec    COVID-19 PCR: NotDetec (2022 07:31)  COVID-19 PCR: NotDetec (2022 19:17)  COVID-19 PCR: Negative (15 Jul 2022 02:43)    Urinalysis Basic - ( 2022 12:56 )  Color: Yellow / Appearance: Hazy / S.010 / pH: x  Gluc: x / Ketone: NEGATIVE  / Bili: Negative / Urobili: 1.0 E.U./dL   Blood: x / Protein: NEGATIVE mg/dL / Nitrite: NEGATIVE   Leuk Esterase: Small / RBC: < 5 /HPF / WBC > 10 /HPF   Sq Epi: x / Non Sq Epi: 0-5 /HPF / Bacteria: None /HPF      Vital Signs:   Vital Signs Last 24 Hrs  T(C): 36.4 (2022 09:00), Max: 36.9 (2022 04:25)  T(F): 97.6 (2022 09:00), Max: 98.5 (2022 04:25)  HR: 63 (2022 09:01) (60 - 72)  BP: 162/71 (2022 09:01) (103/55 - 178/81)  BP(mean): 102 (2022 09:01) (68 - 121)  RR: 22 (2022 09:01) (16 - 30)  SpO2: 94% (2022 09:01) (88% - 99%)    Parameters below as of 2022 09:01  Patient On (Oxygen Delivery Method): room air      Input/Output:   I&O's Detail    2022 07:01  -  2022 07:00  --------------------------------------------------------  IN:    Dexmedetomidine: 19 mL    Heparin: 450 mL    IV PiggyBack: 187.5 mL    Oral Fluid: 1040 mL  Total IN: 1696.5 mL    OUT:    Voided (mL): 1800 mL  Total OUT: 1800 mL    Total NET: -103.5 mL      2022 07:01  -  2022 10:19  --------------------------------------------------------  IN:    Heparin: 19 mL    IV PiggyBack: 62.5 mL  Total IN: 81.5 mL    OUT:  Total OUT: 0 mL    Total NET: 81.5 mL        Daily     Daily

## 2022-07-23 NOTE — PROGRESS NOTE ADULT - SUBJECTIVE AND OBJECTIVE BOX
Subjective:     ROS:   Denies Headache, blurred vision, Chest Pain, SOB, Abdominal pain, nausea or vomiting     Social   aspirin  chewable 81  heparin  Infusion 900  NIFEdipine XL 60  propranolol 10  tamsulosin 0.4      Allergies    No Known Allergies    Intolerances        Vital Signs Last 24 Hrs  T(C): 36.4 (23 Jul 2022 09:00), Max: 37.3 (22 Jul 2022 10:00)  T(F): 97.6 (23 Jul 2022 09:00), Max: 99.2 (22 Jul 2022 10:00)  HR: 63 (23 Jul 2022 09:01) (60 - 72)  BP: 162/71 (23 Jul 2022 09:01) (103/55 - 178/81)  BP(mean): 102 (23 Jul 2022 09:01) (68 - 121)  RR: 22 (23 Jul 2022 09:01) (16 - 30)  SpO2: 94% (23 Jul 2022 09:01) (88% - 99%)    Parameters below as of 23 Jul 2022 09:01  Patient On (Oxygen Delivery Method): room air      I&O's Summary    22 Jul 2022 07:01  -  23 Jul 2022 07:00  --------------------------------------------------------  IN: 1696.5 mL / OUT: 1800 mL / NET: -103.5 mL    23 Jul 2022 07:01  -  23 Jul 2022 09:20  --------------------------------------------------------  IN: 81.5 mL / OUT: 0 mL / NET: 81.5 mL        Physical Exam:  General:  Pulmonary:  Cardiovascular:  Abdominal:  Extremities:  Pulses:   Right:                                                                          Left:  FEM [ ]2+ [ ]1+ [ ]doppler                                             FEM [ ]2+ [ ]1+ [ ]doppler    POP [ ]2+ [ ]1+ [ ]doppler                                             POP [ ]2+ [ ]1+ [ ]doppler    DP [ ]2+ [ ]1+ [ ]doppler                                                DP [ ]2+ [ ]1+ [ ]doppler  PT[ ]2+ [ ]1+ [ ]doppler                                                  PT [ ]2+ [ ]1+ [ ]doppler      LABS:                        9.5    9.72  )-----------( 264      ( 23 Jul 2022 03:05 )             27.3     07-23    135  |  98  |  29<H>  ----------------------------<  131<H>  3.8   |  22  |  1.18    Ca    9.1      23 Jul 2022 03:05  Phos  2.3     07-23  Mg     2.0     07-23    TPro  7.0  /  Alb  3.4  /  TBili  1.0  /  DBili  0.5<H>  /  AST  128<H>  /  ALT  62<H>  /  AlkPhos  65  07-23    PT/INR - ( 23 Jul 2022 03:05 )   PT: 15.0 sec;   INR: 1.26          PTT - ( 23 Jul 2022 03:05 )  PTT:63.3 sec    Radiology and Additional Studies:    A/P: 77y YO Male      Neuro:     Card:     Resp:     GI:     :     Heme:     ID:    Endo:     renal:     Vascular     Dispo:     DVT Ppx:

## 2022-07-23 NOTE — PROGRESS NOTE ADULT - ATTENDING COMMENTS
78 yo M s/p SFA to TP trunk rGSV bypass (7/18). doing well, pulse b/l dopplerable. resume NOAC. stable for transfer.
Initial attending contact date  7/19/22    . See fellow note written above for details. I reviewed the fellow documentation. I have personally seen and examined this patient. I reviewed vitals, labs, medications, cardiac studies, and additional imaging. I agree with the above fellow's findings and plans as written above with the following additions/statements.       76 y/o man c HTN, HLP, DM, A fib/PPM, PVD, COPD found c severe PVD, admitted with limb ischemia. Cardiology initially consulted for pre op risk assessment for LE bypass  -Now s/p  OR without active cardiac issues   -Tele: underlying a fib , V paced  -euvolemic on exam   -Restart ASA and eliquis when able  -Resume home BP meds  -Pls reconsult as needed
COPD, DM2, AF, s/p left SFZ to TP trunk GSV bypass with rhabdomyolysis  physical as above; somewhat disoriented to situation  increase NS to 250 ml/hr and follow CK  restart ASA, hold other cardiac meds as above  night NIV  heparin infusion  echo with good cardiac function  sugars OK  decision making of high complexity
HTN, DM AF, schizophrenia s/p LE bypass c/b rhabdomyolysis and now acute respiratory failure  physical as above  intubated for WOB despite diuresis  on vent flow volume loop s/w obstructive disease; bronchodilators and one dose of solumedrol  propofol and precedex for RASS -3  rest as above
HTN, DM@, TAVR, s/p LE bypasss c/b delrium and acute respiratory failure  physical as above  now extubated on duonebs 1 4h  continue precedex for now with all other psych meds except his klonopin  restart diet  continue anticcoagulation  rest as above  decision making of high complexity

## 2022-07-24 LAB
ANION GAP SERPL CALC-SCNC: 11 MMOL/L — SIGNIFICANT CHANGE UP (ref 5–17)
BLD GP AB SCN SERPL QL: NEGATIVE — SIGNIFICANT CHANGE UP
BUN SERPL-MCNC: 26 MG/DL — HIGH (ref 7–23)
CALCIUM SERPL-MCNC: 9 MG/DL — SIGNIFICANT CHANGE UP (ref 8.4–10.5)
CHLORIDE SERPL-SCNC: 100 MMOL/L — SIGNIFICANT CHANGE UP (ref 96–108)
CO2 SERPL-SCNC: 23 MMOL/L — SIGNIFICANT CHANGE UP (ref 22–31)
CREAT SERPL-MCNC: 1.01 MG/DL — SIGNIFICANT CHANGE UP (ref 0.5–1.3)
EGFR: 76 ML/MIN/1.73M2 — SIGNIFICANT CHANGE UP
GLUCOSE BLDC GLUCOMTR-MCNC: 119 MG/DL — HIGH (ref 70–99)
GLUCOSE BLDC GLUCOMTR-MCNC: 128 MG/DL — HIGH (ref 70–99)
GLUCOSE BLDC GLUCOMTR-MCNC: 158 MG/DL — HIGH (ref 70–99)
GLUCOSE BLDC GLUCOMTR-MCNC: 187 MG/DL — HIGH (ref 70–99)
GLUCOSE SERPL-MCNC: 119 MG/DL — HIGH (ref 70–99)
HCT VFR BLD CALC: 26.8 % — LOW (ref 39–50)
HGB BLD-MCNC: 8.9 G/DL — LOW (ref 13–17)
MAGNESIUM SERPL-MCNC: 1.9 MG/DL — SIGNIFICANT CHANGE UP (ref 1.6–2.6)
MCHC RBC-ENTMCNC: 31.3 PG — SIGNIFICANT CHANGE UP (ref 27–34)
MCHC RBC-ENTMCNC: 33.2 GM/DL — SIGNIFICANT CHANGE UP (ref 32–36)
MCV RBC AUTO: 94.4 FL — SIGNIFICANT CHANGE UP (ref 80–100)
NRBC # BLD: 0 /100 WBCS — SIGNIFICANT CHANGE UP (ref 0–0)
PHOSPHATE SERPL-MCNC: 2.5 MG/DL — SIGNIFICANT CHANGE UP (ref 2.5–4.5)
PLATELET # BLD AUTO: 299 K/UL — SIGNIFICANT CHANGE UP (ref 150–400)
POTASSIUM SERPL-MCNC: 4 MMOL/L — SIGNIFICANT CHANGE UP (ref 3.5–5.3)
POTASSIUM SERPL-SCNC: 4 MMOL/L — SIGNIFICANT CHANGE UP (ref 3.5–5.3)
RBC # BLD: 2.84 M/UL — LOW (ref 4.2–5.8)
RBC # FLD: 13.4 % — SIGNIFICANT CHANGE UP (ref 10.3–14.5)
RH IG SCN BLD-IMP: POSITIVE — SIGNIFICANT CHANGE UP
SODIUM SERPL-SCNC: 134 MMOL/L — LOW (ref 135–145)
WBC # BLD: 9.09 K/UL — SIGNIFICANT CHANGE UP (ref 3.8–10.5)
WBC # FLD AUTO: 9.09 K/UL — SIGNIFICANT CHANGE UP (ref 3.8–10.5)

## 2022-07-24 PROCEDURE — 71045 X-RAY EXAM CHEST 1 VIEW: CPT | Mod: 26

## 2022-07-24 PROCEDURE — 99232 SBSQ HOSP IP/OBS MODERATE 35: CPT

## 2022-07-24 RX ORDER — MAGNESIUM OXIDE 400 MG ORAL TABLET 241.3 MG
800 TABLET ORAL ONCE
Refills: 0 | Status: COMPLETED | OUTPATIENT
Start: 2022-07-24 | End: 2022-07-24

## 2022-07-24 RX ADMIN — Medication 300 MILLIGRAM(S): at 22:16

## 2022-07-24 RX ADMIN — Medication 3 MILLILITER(S): at 05:03

## 2022-07-24 RX ADMIN — Medication 200 MILLIGRAM(S): at 06:02

## 2022-07-24 RX ADMIN — Medication 37.5 MILLIGRAM(S): at 18:16

## 2022-07-24 RX ADMIN — Medication 81 MILLIGRAM(S): at 11:18

## 2022-07-24 RX ADMIN — Medication 100 MILLIGRAM(S): at 11:18

## 2022-07-24 RX ADMIN — Medication 200 MILLIGRAM(S): at 18:16

## 2022-07-24 RX ADMIN — PANTOPRAZOLE SODIUM 40 MILLIGRAM(S): 20 TABLET, DELAYED RELEASE ORAL at 06:04

## 2022-07-24 RX ADMIN — APIXABAN 5 MILLIGRAM(S): 2.5 TABLET, FILM COATED ORAL at 18:16

## 2022-07-24 RX ADMIN — APIXABAN 5 MILLIGRAM(S): 2.5 TABLET, FILM COATED ORAL at 06:00

## 2022-07-24 RX ADMIN — Medication 2: at 22:14

## 2022-07-24 RX ADMIN — Medication 2 MILLIGRAM(S): at 11:18

## 2022-07-24 RX ADMIN — MAGNESIUM OXIDE 400 MG ORAL TABLET 800 MILLIGRAM(S): 241.3 TABLET ORAL at 11:18

## 2022-07-24 RX ADMIN — Medication 60 MILLIGRAM(S): at 11:18

## 2022-07-24 RX ADMIN — Medication 3 MILLILITER(S): at 22:14

## 2022-07-24 RX ADMIN — Medication 3 MILLILITER(S): at 16:01

## 2022-07-24 RX ADMIN — Medication 3 MILLILITER(S): at 11:18

## 2022-07-24 RX ADMIN — Medication 2: at 12:10

## 2022-07-24 RX ADMIN — CHLORHEXIDINE GLUCONATE 1 APPLICATION(S): 213 SOLUTION TOPICAL at 06:03

## 2022-07-24 RX ADMIN — Medication 37.5 MILLIGRAM(S): at 06:02

## 2022-07-24 RX ADMIN — TAMSULOSIN HYDROCHLORIDE 0.4 MILLIGRAM(S): 0.4 CAPSULE ORAL at 22:16

## 2022-07-24 RX ADMIN — Medication 500 MILLIGRAM(S): at 11:18

## 2022-07-24 RX ADMIN — ATORVASTATIN CALCIUM 40 MILLIGRAM(S): 80 TABLET, FILM COATED ORAL at 22:15

## 2022-07-24 NOTE — OCCUPATIONAL THERAPY INITIAL EVALUATION ADULT - MODIFIED CLINICAL TEST OF SENSORY INTEGRATION IN BALANCE TEST
Pt tolerating static standing and weight shifting with RW with modx2 assist for ~1-2 minutes x2 trials.

## 2022-07-24 NOTE — PROGRESS NOTE ADULT - SUBJECTIVE AND OBJECTIVE BOX
INTERVAL EVENTS:  -- NAEO; transferred to [facility] overnight    SUBJECTIVE:  -- reports feeling well; sometimes does get upset about hospitalization but in a good mood today  -- does have a productive cough and finds that his breathing treatments help cough out his phlegm  -- eager to work with PT  -- Review of Systems: 12 point review of systems otherwise negative    MEDICATIONS:  MEDICATIONS  (STANDING):  albuterol/ipratropium for Nebulization 3 milliLiter(s) Nebulizer every 6 hours  allopurinol 100 milliGRAM(s) Oral every 24 hours  apixaban 5 milliGRAM(s) Oral every 12 hours  ascorbic acid 500 milliGRAM(s) Oral daily  aspirin  chewable 81 milliGRAM(s) Oral daily  atorvastatin 40 milliGRAM(s) Oral at bedtime  carBAMazepine 200 milliGRAM(s) Oral two times a day  chlorhexidine 2% Cloths 1 Application(s) Topical <User Schedule>  dextrose 5%. 1000 milliLiter(s) (50 mL/Hr) IV Continuous <Continuous>  dextrose 5%. 1000 milliLiter(s) (50 mL/Hr) IV Continuous <Continuous>  dextrose 5%. 1000 milliLiter(s) (100 mL/Hr) IV Continuous <Continuous>  dextrose 5%. 1000 milliLiter(s) (100 mL/Hr) IV Continuous <Continuous>  dextrose 50% Injectable 25 Gram(s) IV Push once  dextrose 50% Injectable 12.5 Gram(s) IV Push once  dextrose 50% Injectable 25 Gram(s) IV Push once  dextrose 50% Injectable 25 Gram(s) IV Push once  dextrose 50% Injectable 12.5 Gram(s) IV Push once  dextrose 50% Injectable 25 Gram(s) IV Push once  glucagon  Injectable 1 milliGRAM(s) IntraMuscular once  glucagon  Injectable 1 milliGRAM(s) IntraMuscular once  insulin lispro (ADMELOG) corrective regimen sliding scale   SubCutaneous Before meals and at bedtime  loperamide 2 milliGRAM(s) Oral daily  NIFEdipine XL 60 milliGRAM(s) Oral every 24 hours  pantoprazole    Tablet 40 milliGRAM(s) Oral before breakfast  propranolol 10 milliGRAM(s) Oral three times a day  tamsulosin 0.4 milliGRAM(s) Oral at bedtime  traZODone 300 milliGRAM(s) Oral at bedtime  venlafaxine XR. 37.5 milliGRAM(s) Oral every 12 hours    MEDICATIONS  (PRN):  dextrose Oral Gel 15 Gram(s) Oral once PRN Blood Glucose LESS THAN 70 milliGRAM(s)/deciliter  dextrose Oral Gel 15 Gram(s) Oral once PRN Blood Glucose LESS THAN 70 milliGRAM(s)/deciliter    Allergies  No Known Allergies    OBJECTIVE:  Vital Signs Last 24 Hrs  T(C): 36.7 (2022 09:34), Max: 36.7 (2022 09:34)  T(F): 98.1 (2022 09:34), Max: 98.1 (2022 09:34)  HR: 61 (2022 08:23) (60 - 70)  BP: 162/70 (2022 08:23) (106/50 - 165/74)  BP(mean): 88 (2022 05:57) (72 - 107)  RR: 18 (2022 08:23) (17 - 28)  SpO2: 94% (2022 08:23) (92% - 96%)    Parameters below as of 2022 08:23  Patient On (Oxygen Delivery Method): room air    I&O's Summary    2022 07:01  -  2022 07:00  --------------------------------------------------------  IN: 261.5 mL / OUT: 1300 mL / NET: -1038.5 mL    PHYSICAL EXAM:  Gen: NAD, sitting upright in bed  HEENT: NCAT, MMM, clear OP  CV: RRR, no m/g/r appreciated  Pulm: CTA B, no w/r/r; no increase in WOB  Abd: normoactive BS, soft, NTND  : [place] cath  Ext: WWP, large RLE medial bypass incision c/d/i  Neuro: AOx3, nonfocal  Psych: pleasant, conversant and appropriate    LABS:                        8.9    9.09  )-----------( 299      ( 2022 05:45 )             26.8     07-24    134<L>  |  100  |  26<H>  ----------------------------<  119<H>  4.0   |  23  |  1.01    Ca    9.0      2022 05:45  Phos  2.5       Mg     1.9         TPro  7.0  /  Alb  3.4  /  TBili  1.0  /  DBili  0.5<H>  /  AST  128<H>  /  ALT  62<H>  /  AlkPhos  65  07    PT/INR - ( 2022 03:05 )   PT: 15.0 sec;   INR: 1.26     PTT - ( 2022 08:59 )  PTT:64.7 sec    CAPILLARY BLOOD GLUCOSE  POCT Blood Glucose.: 119 mg/dL (2022 06:39)  POCT Blood Glucose.: 163 mg/dL (2022 21:35)  POCT Blood Glucose.: 144 mg/dL (2022 17:08)  POCT Blood Glucose.: 162 mg/dL (2022 11:08)    Urinalysis Basic - ( 2022 12:56 )  Color: Yellow / Appearance: Hazy / S.010 / pH: x  Gluc: x / Ketone: NEGATIVE  / Bili: Negative / Urobili: 1.0 E.U./dL   Blood: x / Protein: NEGATIVE mg/dL / Nitrite: NEGATIVE   Leuk Esterase: Small / RBC: < 5 /HPF / WBC > 10 /HPF   Sq Epi: x / Non Sq Epi: 0-5 /HPF / Bacteria: None /HPF    MICRODATA:  -- No new microdata.    RADIOLOGY/OTHER STUDIES:  -- CXR (my read ) - congestion w/cardiomegaly but no conslidation; comparable to prior

## 2022-07-24 NOTE — OCCUPATIONAL THERAPY INITIAL EVALUATION ADULT - PERTINENT HX OF CURRENT PROBLEM, REHAB EVAL
77y.o M Hx HTN, HLD, DM, TAVR, Pacemaker, Afib, COPD and PVD presents with LLE Critical limb ischemia. Now s/p SFA to TP trunk rGSV bypass (7/18) admitted to SICU for hemodynamic and respiratory monitoring.

## 2022-07-24 NOTE — OCCUPATIONAL THERAPY INITIAL EVALUATION ADULT - ADDITIONAL COMMENTS
Pt lives with his wife in a house, no CHRISTINE. Prior to admit, pt reports being independent with all ADLs, has a walk-in shower with no chair. Pt owns a SC, but primarily was only using cane to ambulate in community, did not require AD in the house.

## 2022-07-24 NOTE — PROGRESS NOTE ADULT - SUBJECTIVE AND OBJECTIVE BOX
Patient is a 77y old  Male who presents with a chief complaint of Critical Limb Ischemia (24 Jul 2022 09:50)    Subjective: Patient states he has a productive cough but breathing over all improved.  Denies shortness of breath.      MEDICATIONS  (STANDING):  albuterol/ipratropium for Nebulization 3 milliLiter(s) Nebulizer every 6 hours  allopurinol 100 milliGRAM(s) Oral every 24 hours  apixaban 5 milliGRAM(s) Oral every 12 hours  ascorbic acid 500 milliGRAM(s) Oral daily  aspirin  chewable 81 milliGRAM(s) Oral daily  atorvastatin 40 milliGRAM(s) Oral at bedtime  carBAMazepine 200 milliGRAM(s) Oral two times a day  chlorhexidine 2% Cloths 1 Application(s) Topical <User Schedule>  dextrose 5%. 1000 milliLiter(s) (50 mL/Hr) IV Continuous <Continuous>  dextrose 5%. 1000 milliLiter(s) (50 mL/Hr) IV Continuous <Continuous>  dextrose 5%. 1000 milliLiter(s) (100 mL/Hr) IV Continuous <Continuous>  dextrose 5%. 1000 milliLiter(s) (100 mL/Hr) IV Continuous <Continuous>  dextrose 50% Injectable 25 Gram(s) IV Push once  dextrose 50% Injectable 12.5 Gram(s) IV Push once  dextrose 50% Injectable 25 Gram(s) IV Push once  dextrose 50% Injectable 25 Gram(s) IV Push once  dextrose 50% Injectable 12.5 Gram(s) IV Push once  dextrose 50% Injectable 25 Gram(s) IV Push once  glucagon  Injectable 1 milliGRAM(s) IntraMuscular once  glucagon  Injectable 1 milliGRAM(s) IntraMuscular once  insulin lispro (ADMELOG) corrective regimen sliding scale   SubCutaneous Before meals and at bedtime  loperamide 2 milliGRAM(s) Oral daily  NIFEdipine XL 60 milliGRAM(s) Oral every 24 hours  pantoprazole    Tablet 40 milliGRAM(s) Oral before breakfast  propranolol 10 milliGRAM(s) Oral three times a day  tamsulosin 0.4 milliGRAM(s) Oral at bedtime  traZODone 300 milliGRAM(s) Oral at bedtime  venlafaxine XR. 37.5 milliGRAM(s) Oral every 12 hours    MEDICATIONS  (PRN):  dextrose Oral Gel 15 Gram(s) Oral once PRN Blood Glucose LESS THAN 70 milliGRAM(s)/deciliter  dextrose Oral Gel 15 Gram(s) Oral once PRN Blood Glucose LESS THAN 70 milliGRAM(s)/deciliter      Allergies    No Known Allergies    Intolerances          Vital Signs Last 24 Hrs  T(C): 36.7 (24 Jul 2022 09:34), Max: 36.7 (24 Jul 2022 09:34)  T(F): 98.1 (24 Jul 2022 09:34), Max: 98.1 (24 Jul 2022 09:34)  HR: 65 (24 Jul 2022 11:28) (60 - 68)  BP: 159/70 (24 Jul 2022 11:28) (106/50 - 164/70)  BP(mean): 88 (24 Jul 2022 05:57) (72 - 98)  RR: 18 (24 Jul 2022 11:28) (17 - 23)  SpO2: 94% (24 Jul 2022 11:28) (92% - 96%)    Parameters below as of 24 Jul 2022 11:28  Patient On (Oxygen Delivery Method): room air      CAPILLARY BLOOD GLUCOSE      POCT Blood Glucose.: 158 mg/dL (24 Jul 2022 12:06)  POCT Blood Glucose.: 119 mg/dL (24 Jul 2022 06:39)  POCT Blood Glucose.: 163 mg/dL (23 Jul 2022 21:35)  POCT Blood Glucose.: 144 mg/dL (23 Jul 2022 17:08)      07-23 @ 07:01  -  07-24 @ 07:00  --------------------------------------------------------  IN: 261.5 mL / OUT: 1300 mL / NET: -1038.5 mL        Physical Exam:    General: NAD, resting comfortably in bed  Pulm: no respiratory distress. On room air.   Abd: soft, non distended no rebound or guarding  Extrem: LLE bypass saphenectomy incision w/ staples, nylon sutures w/o dressing, to air. C/D/I. Superficial desquamation at medial mid calf, appears dry. leg with moderate mottling, cool to touch. Mepilex applied to heel. No visible pressure injuries. Leg externally rotated with knee flexed somewhat.   Pulses: L: Biphasic AT/ High DP + mono/biphasic PT. R: Biphasic DP/PT  Neuro: Responsive to commands , sensation diminished in LLE distal to mid calf  Lines/drains/tubes: Rectal tube in place. No wan.       LABS:                        8.9    9.09  )-----------( 299      ( 24 Jul 2022 05:45 )             26.8     07-24    134<L>  |  100  |  26<H>  ----------------------------<  119<H>  4.0   |  23  |  1.01    Ca    9.0      24 Jul 2022 05:45  Phos  2.5     07-24  Mg     1.9     07-24    TPro  7.0  /  Alb  3.4  /  TBili  1.0  /  DBili  0.5<H>  /  AST  128<H>  /  ALT  62<H>  /  AlkPhos  65  07-23    PT/INR - ( 23 Jul 2022 03:05 )   PT: 15.0 sec;   INR: 1.26          PTT - ( 23 Jul 2022 08:59 )  PTT:64.7 sec        ---------------------------------------------------------------------------  PLEASE CHECK WHEN PRESENT:     [  ] Heart Failure     [  ] Acute     [  ] Acute on Chronic     [  ] Chronic  -------------------------------------------------------------------     [  ]Diastolic [HFpEF]     [  ]Systolic [HFrEF]     [  ]Combined [HFpEF & HFrEF]     [  ] afib     [  ] hypertensive heart disease     [  ]Other:  -------------------------------------------------------------------  [ ] Respiratory failure  [ ] Acute cor pulmonale  [ ] Asthma/COPD Exacerbation  [ ] Pleural effusion  [ ] Aspiration pneumonia  -------------------------------------------------------------------  [  ]TANIKA     [  ]ATN     [  ]Reneal Medullary Necrosis     [  ]Renal Cortical Necrosis     [  ]Other Pathological Lesions:    [  ]CKD 1  [  ]CKD 2  [  ]CKD 3  [  ]CKD 4  [  ]CKD 5  [  ]Other  -------------------------------------------------------------------  [  ]Diabetes  [  ] Diabetic PVD Ulcer  [  ] Neuropathic ulcer to DM  [  ] Diabetes with Nephropathy  [  ] Osteomyelitis due to diabetes  [ ] Hyperglycemia  [ ] Hypoglycemia  --------------------------------------------------------------------  [  ]Malnutrition: See Nutrition Note  [  ]Cachexia  [  ]Other:   [  ]Supplement Ordered:  [  ]Morbid Obesity (BMI >=40]  ---------------------------------------------------------------------  [ ] Sepsis/severe sepsis/septic shock  [ ] UTI  [ ] Pneumonia  -----------------------------------------------------------------------  [ ] Acidosis/alkalosis  [ ] Fluid overload  [ ] Hypokalemia  [ ] Hyperkalemia  [X ] Hypomagnesemia  [ ] Hypophosphatemia  [ ] Hyperphosphatemia  [ ] Hyponatremia  [ ] Hypernatremia  ------------------------------------------------------------------------  [ ] Acute blood loss anemia  [ ] Post op blood loss anemia  [X ] Iron deficiency anemia  [ ] Anemia due to chronic disease  [ ] Hypercoagulable state  [ ] Leukocytosis  ----------------------------------------------------------------------  [ ] Cerebral infarction  [ ] Transient ischemia attack  [ ] Encephalopathy    A/P: 77y.o M Hx HTN, HLD, DM, Pacemaker, Afib and PVD presents with LLE Critical limb ischemia. Now s/p SFA to TP trunk rGSV bypass (7/18) admitted to SICU for hemodynamic and respiratory monitoring now stepdown to 5uris      #Vascular  s/p diagnostic angiogram 7/15 unable to cross occluded popliteal stent  s'/p SFA to TP trunk w/ rGSV SQ (nontunnelled) on 7/18  NOAC    #Diastolic dysfunction/Pacemaker  -f/u cardiology recs    #Afib  NOAC    #DM  Continue moderate ISS    #HTN  Continue home nifedipine    #HLD  Continue home statin + fenofibrate    #Bipolar Disorder  Continue home meds     DVTppx: NOAC  Dispo: pending rehab

## 2022-07-24 NOTE — OCCUPATIONAL THERAPY INITIAL EVALUATION ADULT - GENERAL OBSERVATIONS, REHAB EVAL
Pt received seated in chair, wife at bedside, +tele, +heplock, +[place], in NAD and agreeable to OT. Cleared by ESTELLE Jackson to see.

## 2022-07-24 NOTE — PROGRESS NOTE ADULT - ASSESSMENT
This is a 78yo gentleman with a PMH of HTN, HLD, NIDDM2, chronic Afib/SSS s/p PPM and PAD who p/w LLE critical limb ischemia s/p diagnostic angiogram 7/15 and the bypass on 7/18 with post-op course c/b metabolic encephalopathy and respiratory failure.  Transferred to [facility] on 7/24 for PT and discharge planning.     # LLE Critical limb ischemia  -- on ASA and statin  -- wound care per primary team   -- PT eval today     #HTN  #HLD  -- c/w home regimen     #Hx of Pacemaker placement  #Hx of Afib   -- c/w statin + fenofibrate  -- c/w Apixaban    #Bipolar Disorder  -- c/w home meds    #DVT PPx - Apixaban  #Dispo - pending PT but wife interested in rehab    Marci Swanson MD  Hospitalist Attending  [phone]

## 2022-07-24 NOTE — OCCUPATIONAL THERAPY INITIAL EVALUATION ADULT - LEVEL OF INDEPENDENCE:TOILET, OT EVAL
standing posterior pericare (2 person assist for balance support)/dependent (less than 25% patients effort)

## 2022-07-24 NOTE — OCCUPATIONAL THERAPY INITIAL EVALUATION ADULT - DIAGNOSIS, OT EVAL
Pt admitted for LLE critical limb ischemia now s/p SFA to TP trunk rGSV bypass (7/18) presents with impaired balance, decreased strength, and decreased activity tolerance impacting overall ease of completing ADLs and functional mobility tasks

## 2022-07-25 ENCOUNTER — TRANSCRIPTION ENCOUNTER (OUTPATIENT)
Age: 77
End: 2022-07-25

## 2022-07-25 VITALS — TEMPERATURE: 97 F

## 2022-07-25 DIAGNOSIS — J96.01 ACUTE RESPIRATORY FAILURE WITH HYPOXIA: ICD-10-CM

## 2022-07-25 LAB
ANION GAP SERPL CALC-SCNC: 12 MMOL/L — SIGNIFICANT CHANGE UP (ref 5–17)
BLD GP AB SCN SERPL QL: NEGATIVE — SIGNIFICANT CHANGE UP
BUN SERPL-MCNC: 27 MG/DL — HIGH (ref 7–23)
CALCIUM SERPL-MCNC: 9.1 MG/DL — SIGNIFICANT CHANGE UP (ref 8.4–10.5)
CHLORIDE SERPL-SCNC: 102 MMOL/L — SIGNIFICANT CHANGE UP (ref 96–108)
CO2 SERPL-SCNC: 22 MMOL/L — SIGNIFICANT CHANGE UP (ref 22–31)
CREAT SERPL-MCNC: 1.01 MG/DL — SIGNIFICANT CHANGE UP (ref 0.5–1.3)
CULTURE RESULTS: SIGNIFICANT CHANGE UP
CULTURE RESULTS: SIGNIFICANT CHANGE UP
EGFR: 76 ML/MIN/1.73M2 — SIGNIFICANT CHANGE UP
GLUCOSE BLDC GLUCOMTR-MCNC: 132 MG/DL — HIGH (ref 70–99)
GLUCOSE BLDC GLUCOMTR-MCNC: 151 MG/DL — HIGH (ref 70–99)
GLUCOSE SERPL-MCNC: 126 MG/DL — HIGH (ref 70–99)
HCT VFR BLD CALC: 26.9 % — LOW (ref 39–50)
HGB BLD-MCNC: 9 G/DL — LOW (ref 13–17)
MAGNESIUM SERPL-MCNC: 1.9 MG/DL — SIGNIFICANT CHANGE UP (ref 1.6–2.6)
MCHC RBC-ENTMCNC: 30.9 PG — SIGNIFICANT CHANGE UP (ref 27–34)
MCHC RBC-ENTMCNC: 33.5 GM/DL — SIGNIFICANT CHANGE UP (ref 32–36)
MCV RBC AUTO: 92.4 FL — SIGNIFICANT CHANGE UP (ref 80–100)
NRBC # BLD: 0 /100 WBCS — SIGNIFICANT CHANGE UP (ref 0–0)
PHOSPHATE SERPL-MCNC: 3.1 MG/DL — SIGNIFICANT CHANGE UP (ref 2.5–4.5)
PLATELET # BLD AUTO: 328 K/UL — SIGNIFICANT CHANGE UP (ref 150–400)
POTASSIUM SERPL-MCNC: 3.8 MMOL/L — SIGNIFICANT CHANGE UP (ref 3.5–5.3)
POTASSIUM SERPL-SCNC: 3.8 MMOL/L — SIGNIFICANT CHANGE UP (ref 3.5–5.3)
RBC # BLD: 2.91 M/UL — LOW (ref 4.2–5.8)
RBC # FLD: 13.5 % — SIGNIFICANT CHANGE UP (ref 10.3–14.5)
RH IG SCN BLD-IMP: POSITIVE — SIGNIFICANT CHANGE UP
SARS-COV-2 RNA SPEC QL NAA+PROBE: SIGNIFICANT CHANGE UP
SODIUM SERPL-SCNC: 136 MMOL/L — SIGNIFICANT CHANGE UP (ref 135–145)
SPECIMEN SOURCE: SIGNIFICANT CHANGE UP
SPECIMEN SOURCE: SIGNIFICANT CHANGE UP
WBC # BLD: 8.3 K/UL — SIGNIFICANT CHANGE UP (ref 3.8–10.5)
WBC # FLD AUTO: 8.3 K/UL — SIGNIFICANT CHANGE UP (ref 3.8–10.5)

## 2022-07-25 PROCEDURE — 74176 CT ABD & PELVIS W/O CONTRAST: CPT

## 2022-07-25 PROCEDURE — 94660 CPAP INITIATION&MGMT: CPT

## 2022-07-25 PROCEDURE — 76000 FLUOROSCOPY <1 HR PHYS/QHP: CPT

## 2022-07-25 PROCEDURE — 86901 BLOOD TYPING SEROLOGIC RH(D): CPT

## 2022-07-25 PROCEDURE — U0005: CPT

## 2022-07-25 PROCEDURE — 82803 BLOOD GASES ANY COMBINATION: CPT

## 2022-07-25 PROCEDURE — 71045 X-RAY EXAM CHEST 1 VIEW: CPT

## 2022-07-25 PROCEDURE — 97530 THERAPEUTIC ACTIVITIES: CPT

## 2022-07-25 PROCEDURE — 99232 SBSQ HOSP IP/OBS MODERATE 35: CPT

## 2022-07-25 PROCEDURE — 86923 COMPATIBILITY TEST ELECTRIC: CPT

## 2022-07-25 PROCEDURE — 85025 COMPLETE CBC W/AUTO DIFF WBC: CPT

## 2022-07-25 PROCEDURE — 82947 ASSAY GLUCOSE BLOOD QUANT: CPT

## 2022-07-25 PROCEDURE — 82550 ASSAY OF CK (CPK): CPT

## 2022-07-25 PROCEDURE — 36415 COLL VENOUS BLD VENIPUNCTURE: CPT

## 2022-07-25 PROCEDURE — U0003: CPT

## 2022-07-25 PROCEDURE — 85730 THROMBOPLASTIN TIME PARTIAL: CPT

## 2022-07-25 PROCEDURE — 82570 ASSAY OF URINE CREATININE: CPT

## 2022-07-25 PROCEDURE — 85610 PROTHROMBIN TIME: CPT

## 2022-07-25 PROCEDURE — 97164 PT RE-EVAL EST PLAN CARE: CPT

## 2022-07-25 PROCEDURE — 93970 EXTREMITY STUDY: CPT

## 2022-07-25 PROCEDURE — 85027 COMPLETE CBC AUTOMATED: CPT

## 2022-07-25 PROCEDURE — 93307 TTE W/O DOPPLER COMPLETE: CPT

## 2022-07-25 PROCEDURE — 71045 X-RAY EXAM CHEST 1 VIEW: CPT | Mod: 26

## 2022-07-25 PROCEDURE — 96374 THER/PROPH/DIAG INJ IV PUSH: CPT

## 2022-07-25 PROCEDURE — 84540 ASSAY OF URINE/UREA-N: CPT

## 2022-07-25 PROCEDURE — 99232 SBSQ HOSP IP/OBS MODERATE 35: CPT | Mod: GC

## 2022-07-25 PROCEDURE — 84100 ASSAY OF PHOSPHORUS: CPT

## 2022-07-25 PROCEDURE — 87040 BLOOD CULTURE FOR BACTERIA: CPT

## 2022-07-25 PROCEDURE — C1894: CPT

## 2022-07-25 PROCEDURE — C1887: CPT

## 2022-07-25 PROCEDURE — 80053 COMPREHEN METABOLIC PANEL: CPT

## 2022-07-25 PROCEDURE — 85347 COAGULATION TIME ACTIVATED: CPT

## 2022-07-25 PROCEDURE — 80076 HEPATIC FUNCTION PANEL: CPT

## 2022-07-25 PROCEDURE — 82330 ASSAY OF CALCIUM: CPT

## 2022-07-25 PROCEDURE — 83735 ASSAY OF MAGNESIUM: CPT

## 2022-07-25 PROCEDURE — 85018 HEMOGLOBIN: CPT

## 2022-07-25 PROCEDURE — C1769: CPT

## 2022-07-25 PROCEDURE — C1889: CPT

## 2022-07-25 PROCEDURE — 94640 AIRWAY INHALATION TREATMENT: CPT

## 2022-07-25 PROCEDURE — 84300 ASSAY OF URINE SODIUM: CPT

## 2022-07-25 PROCEDURE — 86900 BLOOD TYPING SEROLOGIC ABO: CPT

## 2022-07-25 PROCEDURE — 83036 HEMOGLOBIN GLYCOSYLATED A1C: CPT

## 2022-07-25 PROCEDURE — 93005 ELECTROCARDIOGRAM TRACING: CPT

## 2022-07-25 PROCEDURE — 86850 RBC ANTIBODY SCREEN: CPT

## 2022-07-25 PROCEDURE — 88304 TISSUE EXAM BY PATHOLOGIST: CPT

## 2022-07-25 PROCEDURE — 82962 GLUCOSE BLOOD TEST: CPT

## 2022-07-25 PROCEDURE — 97161 PT EVAL LOW COMPLEX 20 MIN: CPT

## 2022-07-25 PROCEDURE — 81001 URINALYSIS AUTO W/SCOPE: CPT

## 2022-07-25 PROCEDURE — 99285 EMERGENCY DEPT VISIT HI MDM: CPT

## 2022-07-25 PROCEDURE — 84295 ASSAY OF SERUM SODIUM: CPT

## 2022-07-25 PROCEDURE — 80048 BASIC METABOLIC PNL TOTAL CA: CPT

## 2022-07-25 PROCEDURE — 87635 SARS-COV-2 COVID-19 AMP PRB: CPT

## 2022-07-25 PROCEDURE — 84132 ASSAY OF SERUM POTASSIUM: CPT

## 2022-07-25 RX ORDER — ACETAMINOPHEN 500 MG
650 TABLET ORAL EVERY 6 HOURS
Refills: 0 | Status: DISCONTINUED | OUTPATIENT
Start: 2022-07-25 | End: 2022-07-25

## 2022-07-25 RX ORDER — ASCORBIC ACID 60 MG
1 TABLET,CHEWABLE ORAL
Qty: 0 | Refills: 0 | DISCHARGE
Start: 2022-07-25

## 2022-07-25 RX ORDER — OXYCODONE HYDROCHLORIDE 5 MG/1
5 TABLET ORAL ONCE
Refills: 0 | Status: DISCONTINUED | OUTPATIENT
Start: 2022-07-25 | End: 2022-07-25

## 2022-07-25 RX ORDER — MAGNESIUM OXIDE 400 MG ORAL TABLET 241.3 MG
400 TABLET ORAL ONCE
Refills: 0 | Status: COMPLETED | OUTPATIENT
Start: 2022-07-25 | End: 2022-07-25

## 2022-07-25 RX ORDER — POTASSIUM CHLORIDE 20 MEQ
20 PACKET (EA) ORAL ONCE
Refills: 0 | Status: COMPLETED | OUTPATIENT
Start: 2022-07-25 | End: 2022-07-25

## 2022-07-25 RX ORDER — LOSARTAN POTASSIUM 100 MG/1
50 TABLET, FILM COATED ORAL DAILY
Refills: 0 | Status: DISCONTINUED | OUTPATIENT
Start: 2022-07-25 | End: 2022-07-25

## 2022-07-25 RX ORDER — ASPIRIN/CALCIUM CARB/MAGNESIUM 324 MG
1 TABLET ORAL
Qty: 0 | Refills: 0 | DISCHARGE
Start: 2022-07-25

## 2022-07-25 RX ADMIN — OXYCODONE HYDROCHLORIDE 5 MILLIGRAM(S): 5 TABLET ORAL at 13:00

## 2022-07-25 RX ADMIN — Medication 20 MILLIEQUIVALENT(S): at 11:20

## 2022-07-25 RX ADMIN — Medication 3 MILLILITER(S): at 04:25

## 2022-07-25 RX ADMIN — OXYCODONE HYDROCHLORIDE 5 MILLIGRAM(S): 5 TABLET ORAL at 16:03

## 2022-07-25 RX ADMIN — Medication 650 MILLIGRAM(S): at 12:45

## 2022-07-25 RX ADMIN — Medication 650 MILLIGRAM(S): at 12:12

## 2022-07-25 RX ADMIN — Medication 100 MILLIGRAM(S): at 11:21

## 2022-07-25 RX ADMIN — CHLORHEXIDINE GLUCONATE 1 APPLICATION(S): 213 SOLUTION TOPICAL at 06:10

## 2022-07-25 RX ADMIN — Medication 3 MILLILITER(S): at 11:20

## 2022-07-25 RX ADMIN — Medication 60 MILLIGRAM(S): at 11:20

## 2022-07-25 RX ADMIN — Medication 200 MILLIGRAM(S): at 06:28

## 2022-07-25 RX ADMIN — Medication 81 MILLIGRAM(S): at 11:21

## 2022-07-25 RX ADMIN — APIXABAN 5 MILLIGRAM(S): 2.5 TABLET, FILM COATED ORAL at 06:28

## 2022-07-25 RX ADMIN — MAGNESIUM OXIDE 400 MG ORAL TABLET 400 MILLIGRAM(S): 241.3 TABLET ORAL at 11:21

## 2022-07-25 RX ADMIN — Medication 2: at 12:12

## 2022-07-25 RX ADMIN — OXYCODONE HYDROCHLORIDE 5 MILLIGRAM(S): 5 TABLET ORAL at 13:30

## 2022-07-25 RX ADMIN — Medication 3 MILLILITER(S): at 16:03

## 2022-07-25 RX ADMIN — Medication 2 MILLIGRAM(S): at 11:20

## 2022-07-25 RX ADMIN — PANTOPRAZOLE SODIUM 40 MILLIGRAM(S): 20 TABLET, DELAYED RELEASE ORAL at 07:10

## 2022-07-25 RX ADMIN — LOSARTAN POTASSIUM 50 MILLIGRAM(S): 100 TABLET, FILM COATED ORAL at 11:21

## 2022-07-25 RX ADMIN — Medication 500 MILLIGRAM(S): at 11:20

## 2022-07-25 RX ADMIN — OXYCODONE HYDROCHLORIDE 5 MILLIGRAM(S): 5 TABLET ORAL at 16:44

## 2022-07-25 RX ADMIN — Medication 37.5 MILLIGRAM(S): at 06:28

## 2022-07-25 NOTE — DISCHARGE NOTE PROVIDER - DISCHARGE SERVICE FOR PATIENT
on the discharge service for the patient. I have reviewed and made amendments to the documentation where necessary.
WDL

## 2022-07-25 NOTE — PROGRESS NOTE ADULT - PROBLEM SELECTOR PLAN 9
resolved and monitor Na.  IV fluids once NPO
resolved and monitor Na.  IV fluids once NPO
resolved and monitor Na. off IV fluids  Given diuretics
resolved and monitor Na which increased.  IV fluids once NPO
resolved and monitor Na. off IV fluids Renal function is stable cr 1.3
resolved and monitor Na. off IV fluids Renal function is stable cr 1.3

## 2022-07-25 NOTE — DISCHARGE NOTE PROVIDER - HOSPITAL COURSE
The pt is a 77M hx HTN, HLD, DM, Pacemaker, PVD, TAVR, c/o left leg pain x2 days. 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 Pt went to Urgent Care after pain started and he was then referred to the ED for further mgmt once it was established there was no flow to affected limb along with leg being mottled and cool. Pt was then seen at outside hospital in [place] and was told the stent was occluded, he was then seen by a vascular surgeon who did an angiogram one day prior and told pt the leg had to be amputated. Patient was transferred here for a second opinion from Dr Capone. On arrival, he was heparinized. He was taken for angiography on 7/15, which showed complete occlusion of his bypass graft with a distal stenosis that was unable to be crossed and an open TP trunk with runoff that was difficult to assess due to limited flow. On 7/18, he was taken to the OR for SFA to TP trunk bypass with subcutaneous (nontunnelled) reversed great saphenous vein graft. In the post operative period, he received volume to prevent renal damage associated with reperfusion and rhabdomyolysis. His CK peaked at ~20k and his SCr did increase somewhat to 1.2. However, following, he developed respiratory distress and was intubated for airway protection and given IV diuresis. He was kept on minimal vent settings and extubated the following day without event. Over the course of his hospitalization, he had return of his DP and PT signals on his left foot (signals absent on arrival with only a weak pop signal). His home medications were restarted and he was transferred to the stepdown unit. PT recommended SUSAN.

## 2022-07-25 NOTE — DISCHARGE NOTE PROVIDER - NSDCACTIVITY_GEN_ALL_CORE
No heavy lifting/straining/Follow Instructions Provided by your Surgical Team Showering allowed/Walking - Indoors allowed/No heavy lifting/straining/Walking - Outdoors allowed/Follow Instructions Provided by your Surgical Team

## 2022-07-25 NOTE — PROGRESS NOTE ADULT - PROBLEM SELECTOR PLAN 6
no indication for bronchodilator.  Pulmonary status is stable

## 2022-07-25 NOTE — PROGRESS NOTE ADULT - PROBLEM SELECTOR PLAN 7
reviewed cardiology note and emailed cardiology to discuss the case
reviewed cardiology note
reviewed cardiology note and emailed cardiology to discuss the case
reviewed cardiology note

## 2022-07-25 NOTE — PROGRESS NOTE ADULT - PROBLEM SELECTOR PLAN 3
Continue insulin sliding scale. Maintain her blood sugar in the range between 140- 180, and not below 70. Monitor for hypoglycemia. Monitor blood sugar with advancing diet. Hold oral hypoglycemic agents during hospitalization. Adjust insulin.  ON ISS and BS is controlled.  He is on Ozempic as outpatient and HbA1C improved

## 2022-07-25 NOTE — PROGRESS NOTE ADULT - SUBJECTIVE AND OBJECTIVE BOX
INTERVAL EVENTS:  -- NAEO    SUBJECTIVE:  -- Denies fevers, chills, CP, SOB, palpitations, orthopnea and PND  -- Tolerating PO intake, +flatus/BM, voiding.  -- Review of Systems: 12 point review of systems otherwise negative    MEDICATIONS:  MEDICATIONS  (STANDING):  albuterol/ipratropium for Nebulization 3 milliLiter(s) Nebulizer every 6 hours  allopurinol 100 milliGRAM(s) Oral every 24 hours  apixaban 5 milliGRAM(s) Oral every 12 hours  ascorbic acid 500 milliGRAM(s) Oral daily  aspirin  chewable 81 milliGRAM(s) Oral daily  atorvastatin 40 milliGRAM(s) Oral at bedtime  carBAMazepine 200 milliGRAM(s) Oral two times a day  chlorhexidine 2% Cloths 1 Application(s) Topical <User Schedule>  dextrose 5%. 1000 milliLiter(s) (50 mL/Hr) IV Continuous <Continuous>  dextrose 5%. 1000 milliLiter(s) (50 mL/Hr) IV Continuous <Continuous>  dextrose 5%. 1000 milliLiter(s) (100 mL/Hr) IV Continuous <Continuous>  dextrose 5%. 1000 milliLiter(s) (100 mL/Hr) IV Continuous <Continuous>  dextrose 50% Injectable 25 Gram(s) IV Push once  dextrose 50% Injectable 12.5 Gram(s) IV Push once  dextrose 50% Injectable 25 Gram(s) IV Push once  dextrose 50% Injectable 25 Gram(s) IV Push once  dextrose 50% Injectable 12.5 Gram(s) IV Push once  dextrose 50% Injectable 25 Gram(s) IV Push once  glucagon  Injectable 1 milliGRAM(s) IntraMuscular once  glucagon  Injectable 1 milliGRAM(s) IntraMuscular once  insulin lispro (ADMELOG) corrective regimen sliding scale   SubCutaneous Before meals and at bedtime  loperamide 2 milliGRAM(s) Oral daily  losartan 50 milliGRAM(s) Oral daily  NIFEdipine XL 60 milliGRAM(s) Oral every 24 hours  pantoprazole    Tablet 40 milliGRAM(s) Oral before breakfast  propranolol 10 milliGRAM(s) Oral three times a day  tamsulosin 0.4 milliGRAM(s) Oral at bedtime  traZODone 300 milliGRAM(s) Oral at bedtime  venlafaxine XR. 37.5 milliGRAM(s) Oral every 12 hours    MEDICATIONS  (PRN):  dextrose Oral Gel 15 Gram(s) Oral once PRN Blood Glucose LESS THAN 70 milliGRAM(s)/deciliter  dextrose Oral Gel 15 Gram(s) Oral once PRN Blood Glucose LESS THAN 70 milliGRAM(s)/deciliter    Allergies  No Known Allergies    OBJECTIVE:  Vital Signs Last 24 Hrs  T(C): 36.8 (25 Jul 2022 10:21), Max: 37.1 (24 Jul 2022 18:44)  T(F): 98.3 (25 Jul 2022 10:21), Max: 98.8 (24 Jul 2022 18:44)  HR: 68 (25 Jul 2022 11:18) (62 - 70)  BP: 157/71 (25 Jul 2022 11:18) (146/68 - 169/81)  BP(mean): --  RR: 18 (25 Jul 2022 11:18) (14 - 18)  SpO2: 96% (25 Jul 2022 11:18) (91% - 96%)    Parameters below as of 25 Jul 2022 11:18  Patient On (Oxygen Delivery Method): room air    I&O's Summary    24 Jul 2022 07:01  -  25 Jul 2022 07:00  --------------------------------------------------------  IN: 405 mL / OUT: 1700 mL / NET: -1295 mL    25 Jul 2022 07:01  -  25 Jul 2022 11:39  --------------------------------------------------------  IN: 180 mL / OUT: 0 mL / NET: 180 mL    PHYSICAL EXAM:  Gen: NAD, sitting upright in bed  HEENT: NCAT, MMM, clear OP  CV: RRR, no m/g/r appreciated  Pulm: CTA B, no w/r/r; no increase in WOB  Abd: normoactive BS, soft, NTND  : [place] cath  Ext: WWP, large RLE medial bypass incision c/d/i  Neuro: AOx3, nonfocal  Psych: pleasant, conversant and appropriate    LABS:                        9.0    8.30  )-----------( 328      ( 25 Jul 2022 05:30 )             26.9     07-25    136  |  102  |  27<H>  ----------------------------<  126<H>  3.8   |  22  |  1.01    Ca    9.1      25 Jul 2022 05:30  Phos  3.1     07-25  Mg     1.9     07-25    CAPILLARY BLOOD GLUCOSE  POCT Blood Glucose.: 151 mg/dL (25 Jul 2022 11:31)  POCT Blood Glucose.: 132 mg/dL (25 Jul 2022 07:15)  POCT Blood Glucose.: 187 mg/dL (24 Jul 2022 21:39)  POCT Blood Glucose.: 128 mg/dL (24 Jul 2022 17:34)  POCT Blood Glucose.: 158 mg/dL (24 Jul 2022 12:06)    MICRODATA:  -- No new microdata.    RADIOLOGY/OTHER STUDIES:  -- CXR (7/25, my read) - likely the unchanged; just rotated on today's x-ray

## 2022-07-25 NOTE — PROGRESS NOTE ADULT - PROVIDER SPECIALTY LIST ADULT
Cardiology
Hospitalist
SICU
SICU
Vascular Surgery
Pulmonology
SICU
Vascular Surgery
Hospitalist
Vascular Surgery
Hospitalist
SICU
SICU
Internal Medicine
Pulmonology
Pulmonology
Critical Care
Critical Care

## 2022-07-25 NOTE — PROGRESS NOTE ADULT - PROBLEM SELECTOR PLAN 5
he is optimized for surgery
The patient is hemodynamically stable.  The pain is controlled.  Patient is on DVT prophylaxis.  Patient is using incentive spirometry.  Oxygen saturation is acceptable.  Advance diet as tolerated.  Advance activity as tolerated.  Monitor for ileus.  Patient is on Laxatives.  Surgical wound is stable.   indication for monitor bed.
The patient is hemodynamically stable.  The pain is controlled.  Patient is on DVT prophylaxis.  Patient is using incentive spirometry.  Oxygen saturation is acceptable.  Advance diet as tolerated.  Advance activity as tolerated.  Monitor for ileus.  Patient is on Laxatives.  Surgical wound is stable.   indication for monitor bed.

## 2022-07-25 NOTE — PROGRESS NOTE ADULT - PROBLEM SELECTOR PROBLEM 7
LVH (left ventricular hypertrophy)

## 2022-07-25 NOTE — DISCHARGE NOTE PROVIDER - NSDCCPCAREPLAN_GEN_ALL_CORE_FT
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

## 2022-07-25 NOTE — DISCHARGE NOTE PROVIDER - NSDCQMCOGNITION_NEU_ALL_CORE
No difficulties Tissue Cultured Epidermal Autograft Text: The defect edges were debeveled with a #15 scalpel blade.  Given the location of the defect, shape of the defect and the proximity to free margins a tissue cultured epidermal autograft was deemed most appropriate.  The graft was then trimmed to fit the size of the defect.  The graft was then placed in the primary defect and oriented appropriately.

## 2022-07-25 NOTE — PROGRESS NOTE ADULT - SUBJECTIVE AND OBJECTIVE BOX
O/N: CARIDAD SALMERON  7/24: cxr improved, no lasix, on room air                      ----------------------------------------  [ ] Respiratory failure  [ ] Acute cor pulmonale  [ ] Asthma/COPD Exacerbation  [ ] Pleural effusion  [ ] Aspiration pneumonia  -------------------------------------------------------------------  [  ]TANIKA     [  ]ATN     [  ]Reneal Medullary Necrosis     [  ]Renal Cortical Necrosis     [  ]Other Pathological Lesions:    [  ]CKD 1  [  ]CKD 2  [  ]CKD 3  [  ]CKD 4  [  ]CKD 5  [  ]Other  -------------------------------------------------------------------  [  ]Diabetes  [  ] Diabetic PVD Ulcer  [  ] Neuropathic ulcer to DM  [  ] Diabetes with Nephropathy  [  ] Osteomyelitis due to diabetes  [ ] Hyperglycemia  [ ] Hypoglycemia  --------------------------------------------------------------------  [  ]Malnutrition: See Nutrition Note  [  ]Cachexia  [  ]Other:   [  ]Supplement Ordered:  [  ]Morbid Obesity (BMI >=40]  ---------------------------------------------------------------------  [ ] Sepsis/severe sepsis/septic shock  [ ] UTI  [ ] Pneumonia  -----------------------------------------------------------------------  [ ] Acidosis/alkalosis  [ ] Fluid overload  [ ] Hypokalemia  [ ] Hyperkalemia  [X ] Hypomagnesemia  [ ] Hypophosphatemia  [ ] Hyperphosphatemia  [ ] Hyponatremia  [ ] Hypernatremia  ------------------------------------------------------------------------  [ ] Acute blood loss anemia  [ ] Post op blood loss anemia  [X ] Iron deficiency anemia  [ ] Anemia due to chronic disease  [ ] Hypercoagulable state  [ ] Leukocytosis  ----------------------------------------------------------------------  [ ] Cerebral infarction  [ ] Transient ischemia attack  [ ] Encephalopathy    A/P: 77y.o M Hx HTN, HLD, DM, Pacemaker, Afib and PVD presents with LLE Critical limb ischemia. Now s/p SFA to TP trunk rGSV bypass (7/18) admitted to SICU for hemodynamic and respiratory monitoring now stepdown to 5uris      #Vascular  s/p diagnostic angiogram 7/15 unable to cross occluded popliteal stent  s'/p SFA to TP trunk w/ rGSV SQ (nontunnelled) on 7/18  NOAC    #Diastolic dysfunction/Pacemaker  -f/u cardiology recs    #Afib  NOAC    #DM  Continue moderate ISS    #HTN  Continue home nifedipine    #HLD  Continue home statin + fenofibrate    #Bipolar Disorder  Continue home meds     DVTppx: NOAC  Dispo: pending rehab   O/N: JARON, VSS  7/24: cxr improved, no lasix, on room air    S: Patient does not have any complaints    O: Examined in bed resting comfortably     ROS: Denies headache, blurred vision, chest pain, SOB, abdominal pain, nausea or vomiting.         apixaban 5  aspirin  chewable 81  NIFEdipine XL 60  propranolol 10  tamsulosin 0.4      Allergies    No Known Allergies    Intolerances        Vital Signs Last 24 Hrs  T(C): 36.4 (25 Jul 2022 05:00), Max: 37.1 (24 Jul 2022 18:44)  T(F): 97.5 (25 Jul 2022 05:00), Max: 98.8 (24 Jul 2022 18:44)  HR: 68 (24 Jul 2022 23:59) (61 - 70)  BP: 146/68 (24 Jul 2022 23:59) (146/68 - 169/81)  BP(mean): --  RR: 16 (24 Jul 2022 23:59) (14 - 18)  SpO2: 91% (24 Jul 2022 23:59) (91% - 95%)    Parameters below as of 24 Jul 2022 23:59  Patient On (Oxygen Delivery Method): room air      I&O's Summary    24 Jul 2022 07:01  -  25 Jul 2022 07:00  --------------------------------------------------------  IN: 405 mL / OUT: 900 mL / NET: -495 mL      Physical Exam:    General: NAD, resting comfortably in bed  Pulm: no respiratory distress. On room air.   Abd: soft, non distended no rebound or guarding  Extrem: LLE bypass saphenectomy incision w/ staples, nylon sutures w/o dressing, to air. C/D/I. Superficial desquamation at medial mid calf, appears dry. leg with moderate mottling, cool to touch. Mepilex applied to heel. No visible pressure injuries.  Pulses: L: Biphasic AT/ High DP + mono/biphasic PT. R: Biphasic DP/PT  Neuro: Responsive to commands , sensation diminished in LLE distal to mid calf      LABS:                        9.0    8.30  )-----------( 328      ( 25 Jul 2022 05:30 )             26.9     07-24    134<L>  |  100  |  26<H>  ----------------------------<  119<H>  4.0   |  23  |  1.01    Ca    9.0      24 Jul 2022 05:45  Phos  2.5     07-24  Mg     1.9     07-24      PTT - ( 23 Jul 2022 08:59 )  PTT:64.7 sec    Radiology and Additional Studies:            ----------------------------------------  [ ] Respiratory failure  [ ] Acute cor pulmonale  [ ] Asthma/COPD Exacerbation  [ ] Pleural effusion  [ ] Aspiration pneumonia  -------------------------------------------------------------------  [  ]TANIKA     [  ]ATN     [  ]Reneal Medullary Necrosis     [  ]Renal Cortical Necrosis     [  ]Other Pathological Lesions:    [  ]CKD 1  [  ]CKD 2  [  ]CKD 3  [  ]CKD 4  [  ]CKD 5  [  ]Other  -------------------------------------------------------------------  [  ]Diabetes  [  ] Diabetic PVD Ulcer  [  ] Neuropathic ulcer to DM  [  ] Diabetes with Nephropathy  [  ] Osteomyelitis due to diabetes  [ ] Hyperglycemia  [ ] Hypoglycemia  --------------------------------------------------------------------  [  ]Malnutrition: See Nutrition Note  [  ]Cachexia  [  ]Other:   [  ]Supplement Ordered:  [  ]Morbid Obesity (BMI >=40]  ---------------------------------------------------------------------  [ ] Sepsis/severe sepsis/septic shock  [ ] UTI  [ ] Pneumonia  -----------------------------------------------------------------------  [ ] Acidosis/alkalosis  [ ] Fluid overload  [ ] Hypokalemia  [ ] Hyperkalemia  [X ] Hypomagnesemia  [ ] Hypophosphatemia  [ ] Hyperphosphatemia  [ ] Hyponatremia  [ ] Hypernatremia  ------------------------------------------------------------------------  [ ] Acute blood loss anemia  [ ] Post op blood loss anemia  [X ] Iron deficiency anemia  [ ] Anemia due to chronic disease  [ ] Hypercoagulable state  [ ] Leukocytosis  ----------------------------------------------------------------------  [ ] Cerebral infarction  [ ] Transient ischemia attack  [ ] Encephalopathy    A/P: 77y.o M Hx HTN, HLD, DM, Pacemaker, Afib and PVD presents with LLE Critical limb ischemia. Now s/p SFA to TP trunk rGSV bypass (7/18) admitted to SICU for hemodynamic and respiratory monitoring now stepdown to 5uris      #Vascular  s/p diagnostic angiogram 7/15 unable to cross occluded popliteal stent  s'/p SFA to TP trunk w/ rGSV SQ (nontunnelled) on 7/18  NOAC    #Diastolic dysfunction/Pacemaker  -f/u cardiology recs    #Afib  NOAC    #DM  Continue moderate ISS    #HTN  Continue home nifedipine    #HLD  Continue home statin + fenofibrate    #Bipolar Disorder  Continue home meds     DVTppx: NOAC  Dispo: pT rec SUSAN, pending placement

## 2022-07-25 NOTE — PROGRESS NOTE ADULT - PROBLEM SELECTOR PLAN 4
follow with [facility] and vascular.  he is on Heparin and there is an element of Rhabdo.  He was on IV fluid and there is evidence of fluid overload on CXR and given lasix
he is for OR tomorrow and on heparin with therapeutic PTT
follow with [facility] and vascular.  he is on Heparin and there is an element of Rhabdo.  He was on IV fluid and there is evidence of fluid overload on CXR and given lasix  CXR improved.   He is ambulating and for DC today to rehab
follow with [facility] and vascular.  he is on Heparin and there is an element of Rhabdo.  He was on IV fluid and there is evidence of fluid overload on CXR and given lasix  CXR improved
follow with [facility] and vascular.  he is on Heparin and there is an element of Rhabdo.  He was on IV fluid and there is evidence of fluid overload on CXR and given lasix  CXR improved.  PTT is therapeutic
he is for OR tomorrow and on heparin with therapeutic PTT

## 2022-07-25 NOTE — DISCHARGE NOTE NURSING/CASE MANAGEMENT/SOCIAL WORK - PATIENT PORTAL LINK FT
You can access the FollowMyHealth Patient Portal offered by [facility] by registering at the following website: [email or web address]. By joining [facility]’s FollowMyHealth portal, you will also be able to view your health information using other applications (apps) compatible with our system.

## 2022-07-25 NOTE — PROGRESS NOTE ADULT - TIME BILLING
Patient seen and examined with house-staff during bedside rounds.  Resident note read, including vitals, physical findings, laboratory data, and radiological reports.   Revisions included below.  Direct personal management at bed side and extensive interpretation of the data.  Plan was outlined and discussed in details with the housestaff.  Decision making of high complexity  Action taken for acute disease activity to reflect the level of care provided:  - medication reconciliation  - review laboratory data  Discussed with vascular and wife  he is sating 97 on RA and not in distress.
Patient seen and examined with house-staff during bedside rounds.  Resident note read, including vitals, physical findings, laboratory data, and radiological reports.   Revisions included below.  Direct personal management at bed side and extensive interpretation of the data.  Plan was outlined and discussed in details with the housestaff.  Decision making of high complexity  Action taken for acute disease activity to reflect the level of care provided:  - medication reconciliation  - review laboratory data
Patient seen and examined with house-staff during bedside rounds.  Resident note read, including vitals, physical findings, laboratory data, and radiological reports.   Revisions included below.  Direct personal management at bed side and extensive interpretation of the data.  Plan was outlined and discussed in details with the housestaff.  Decision making of high complexity  Action taken for acute disease activity to reflect the level of care provided:  - medication reconciliation  - review laboratory data  Discussed with vascular and wife  for OR today and will follow postop
as noted above
Patient seen and examined with house-staff during bedside rounds.  Resident note read, including vitals, physical findings, laboratory data, and radiological reports.   Revisions included below.  Direct personal management at bed side and extensive interpretation of the data.  Plan was outlined and discussed in details with the housestaff.  Decision making of high complexity  Action taken for acute disease activity to reflect the level of care provided:  - medication reconciliation  - review laboratory data  Discussed with vascular and wife  I discussed with the CCM and he is to be extubated.
Patient seen and examined with house-staff during bedside rounds.  Resident note read, including vitals, physical findings, laboratory data, and radiological reports.   Revisions included below.  Direct personal management at bed side and extensive interpretation of the data.  Plan was outlined and discussed in details with the housestaff.  Decision making of high complexity  Action taken for acute disease activity to reflect the level of care provided:  - medication reconciliation  - review laboratory data  Discussed with vascular and wife
Patient seen and examined with house-staff during bedside rounds.  Resident note read, including vitals, physical findings, laboratory data, and radiological reports.   Revisions included below.  Direct personal management at bed side and extensive interpretation of the data.  Plan was outlined and discussed in details with the housestaff.  Decision making of high complexity  Action taken for acute disease activity to reflect the level of care provided:  - medication reconciliation  - review laboratory data  Discussed with vascular and wife

## 2022-07-25 NOTE — PROGRESS NOTE ADULT - PROBLEM SELECTOR PLAN 2
on statin and fenofirate

## 2022-07-25 NOTE — PROGRESS NOTE ADULT - PROBLEM SELECTOR PLAN 1
BP is controlled on the current regimen.  he is to take his antihtn meds preop Nifedipine and Propronolol
BP is controlled on the current regimen.
BP is controlled on the current regimen but not at target.  he is to take his antihtn meds preop Nifedipine and Propronolol.  BP is elevated this morning
BP is controlled on the current regimen.

## 2022-07-25 NOTE — DISCHARGE NOTE PROVIDER - NSDCFUADDINST_GEN_ALL_CORE_FT
FOLLOW UP:  __ in 1 week.   Your appointment has been made for _______. Call the office at  with any questions  WOUND CARE: You may shower; soap and water over incision sites. Do not scrub. Pat dry when done.   ACTIVITY: Ambulate as tolerated, but no heavy lifting (>10lbs) or strenuous exercise.  DIET: You may resume regular diet.   Call the office if you experience increasing pain, redness, swelling or drainage from incision sites/wounds, or temperature >101.4F.   NEW MEDICATIONS:  ADDITIONAL FOLLOW UP AFTER DISCHARGE:  DISCHARGE DESTINATION:  FOLLOW UP:  __ in 1 week.   Your appointment has been made for _______. Call the office at  with any questions  WOUND CARE: You may shower; soap and water over incision sites. Do not scrub. Pat dry when done. If areas along the incision are retaining moisture, please keep dry using a gauze placed in the skin fold and secured with tape.   ACTIVITY: Ambulate as tolerated, but no heavy lifting (>10lbs) or strenuous exercise.  DIET: You may resume your usual diet.   Call the office if you experience increasing pain, redness, swelling or drainage from incision sites/wounds, or temperature >101.4F. Please call the office if you are having discoloration or pain to your foot.   NEW MEDICATIONS:  DISCHARGE DESTINATION: SUSAN FOLLOW UP: Your appointment has been made for 1-2 weeks. However, it has not yet been scheduled due to an issue with the computers at clinic. Please call the office at  tomorrow to find the time of the follow up appointment.   WOUND CARE: You may shower; soap and water over incision sites. Do not scrub. Pat dry when done. If areas along the incision are retaining moisture, please keep dry using a gauze placed in the skin fold and secured with tape.   ACTIVITY: Ambulate as tolerated, but no heavy lifting (>10lbs) or strenuous exercise.  DIET: You may resume your usual diet.   Call the office if you experience increasing pain, redness, swelling or drainage from incision sites/wounds, or temperature >101.4F. Please call the office if you are having discoloration or pain to your foot.   NEW MEDICATIONS:  DISCHARGE DESTINATION: SUSAN

## 2022-07-25 NOTE — PROGRESS NOTE ADULT - PROBLEM SELECTOR PROBLEM 9
TANIKA (acute kidney injury)

## 2022-07-25 NOTE — PROGRESS NOTE ADULT - REASON FOR ADMISSION
Critical Limb Ischemia

## 2022-07-25 NOTE — DISCHARGE NOTE PROVIDER - NSDCCPTREATMENT_GEN_ALL_CORE_FT
PRINCIPAL PROCEDURE  Procedure: Creation of femoropopliteal arterial bypass in left lower extremity using vein graft  Findings and Treatment:       SECONDARY PROCEDURE  Procedure: Endotracheal intubation  Findings and Treatment:     Procedure: Insertion, catheter, dialysis circuit, with diagnostic angiogram  Findings and Treatment:

## 2022-07-25 NOTE — DISCHARGE NOTE PROVIDER - NSDCQMAMI_CARD_ALL_CORE
No
Patient is presenting to the Emergency Department with a request to have COVID-19 testing.  Denies symptoms, including cough, shortness of breath, fever, chills, bodyaches or sore throat.

## 2022-07-25 NOTE — PROGRESS NOTE ADULT - PROBLEM SELECTOR PROBLEM 5
Preoperative clearance

## 2022-07-25 NOTE — PROGRESS NOTE ADULT - ASSESSMENT
This is a 76yo gentleman with a PMH of HTN, HLD, NIDDM2, chronic Afib/SSS s/p PPM and PAD who p/w LLE critical limb ischemia s/p diagnostic angiogram 7/15 and the bypass on 7/18 with post-op course c/b metabolic encephalopathy and respiratory failure.  Transferred to [facility] on 7/24 for PT and discharge planning.  Medically ready as of today.     # LLE Critical limb ischemia  -- on ASA and statin  -- wound care per primary team   -- PT eval today     #HTN  #HLD  -- c/w home regimen   -- restart ARB today     #Hx of Pacemaker placement  #Hx of Afib   -- c/w statin + fenofibrate  -- c/w Apixaban    #Bipolar Disorder  -- c/w home meds    #DVT PPx - Apixaban  #Dispo - pending PT but wife interested in rehab    Marci Swanson MD  Hospitalist Attending  [phone]   This is a 78yo gentleman with a PMH of HTN, HLD, NIDDM2, chronic Afib/SSS s/p PPM and PAD who p/w LLE critical limb ischemia s/p diagnostic angiogram 7/15 and the bypass on 7/18 with post-op course c/b metabolic encephalopathy and respiratory failure.  Transferred to [facility] on 7/24 for PT and discharge planning.  Medically ready as of today.     # LLE Critical limb ischemia  -- on ASA and statin  -- wound care per primary team   -- PT eval today     #HTN  #HLD  -- c/w home regimen   -- restart ARB today   -- restart chlorthalidone tomorrow if BP and Cr permit    #Hx of Pacemaker placement  #Hx of Afib   -- c/w statin + fenofibrate  -- c/w Apixaban    #Bipolar Disorder  -- c/w home meds    #DVT PPx - Apixaban  #Dispo - pending PT but wife interested in rehab    Macri Swanson MD  Hospitalist Attending  [phone]

## 2022-07-25 NOTE — PROGRESS NOTE ADULT - PROBLEM SELECTOR PLAN 8
stable and echo is in chart from 3/23

## 2022-07-25 NOTE — DISCHARGE NOTE PROVIDER - CARE PROVIDERS DIRECT ADDRESSES
[email or web address]

## 2022-07-25 NOTE — PROGRESS NOTE ADULT - SUBJECTIVE AND OBJECTIVE BOX
Interval Events: Reviewed  Patient seen and examined at bedside.    Patient is a 77y old  Male who presents with a chief complaint of Critical Limb Ischemia (25 Jul 2022 12:48)    he is doing better  PAST MEDICAL & SURGICAL HISTORY:  HTN (hypertension)      High cholesterol      DM (diabetes mellitus)      Popliteal aneurysm      Pacemaker      S/P TAVR (transcatheter aortic valve replacement)          MEDICATIONS:  Pulmonary:  albuterol/ipratropium for Nebulization 3 milliLiter(s) Nebulizer every 6 hours    Antimicrobials:    Anticoagulants:  apixaban 5 milliGRAM(s) Oral every 12 hours  aspirin  chewable 81 milliGRAM(s) Oral daily    Cardiac:  losartan 50 milliGRAM(s) Oral daily  NIFEdipine XL 60 milliGRAM(s) Oral every 24 hours  propranolol 10 milliGRAM(s) Oral three times a day  tamsulosin 0.4 milliGRAM(s) Oral at bedtime      Allergies    No Known Allergies    Intolerances        Vital Signs Last 24 Hrs  T(C): 36.1 (25 Jul 2022 13:25), Max: 36.8 (25 Jul 2022 10:21)  T(F): 97 (25 Jul 2022 13:25), Max: 98.3 (25 Jul 2022 10:21)  HR: 68 (25 Jul 2022 11:18) (65 - 70)  BP: 157/71 (25 Jul 2022 11:18) (146/68 - 176/73)  BP(mean): --  RR: 18 (25 Jul 2022 11:18) (16 - 18)  SpO2: 96% (25 Jul 2022 11:18) (91% - 96%)    Parameters below as of 25 Jul 2022 11:18  Patient On (Oxygen Delivery Method): room air        07-24 @ 07:01 - 07-25 @ 07:00  --------------------------------------------------------  IN: 405 mL / OUT: 1700 mL / NET: -1295 mL    07-25 @ 07:01  -  07-25 @ 21:30  --------------------------------------------------------  IN: 330 mL / OUT: 350 mL / NET: -20 mL          Review of Systems:   •	General: negative  •	Skin/Breast: negative  •	Ophthalmologic: negative  •	ENMT: negative  •	Respiratory and Thorax: negative  •	Cardiovascular: negative  •	Gastrointestinal: negative  •	Genitourinary: negative  •	Musculoskeletal: negative  •	Neurological: negative  •	Psychiatric: negative  •	Hematology/Lymphatics: negative  •	Endocrine: negative  •	Allergic/Immunologic: negative    Physical Exam:   • Constitutional:	refer to the dietion /Nutritionist note  • Eyes:	EOMI; PERRL; no drainage or redness  • ENMT:	No oral lesions; no gross abnormalities  • Neck	No bruits; no thyromegaly or nodules  • Breasts:	not examined  • Back:	No deformity or limitation of movement  • Respiratory:	Breath Sounds equal & clear to percussion & auscultation, no accessory muscle use  • Cardiovascular:	Regular rate & rhythm, normal S1, S2; no murmurs, gallops or rubs; no S3, S4  • Gastrointestinal:	Soft, non-tender, no hepatosplenomegaly, normal bowel sounds  • Genitourinary:	not examined  • Rectal: not examined  • Extremities:	No cyanosis, clubbing or edema  • Vascular:	Equal and normal pulses (carotid, femoral, dorsalis pedis)  • Neurologica:l	not examined  • Skin:	No lesions; no rash  • Lymph Nodes:	No lymphadedenopathy  • Musculoskeletal:	No joint pain, swelling or deformity; no limitation of movement        LABS:      CBC Full  -  ( 25 Jul 2022 05:30 )  WBC Count : 8.30 K/uL  RBC Count : 2.91 M/uL  Hemoglobin : 9.0 g/dL  Hematocrit : 26.9 %  Platelet Count - Automated : 328 K/uL  Mean Cell Volume : 92.4 fl  Mean Cell Hemoglobin : 30.9 pg  Mean Cell Hemoglobin Concentration : 33.5 gm/dL  Auto Neutrophil # : x  Auto Lymphocyte # : x  Auto Monocyte # : x  Auto Eosinophil # : x  Auto Basophil # : x  Auto Neutrophil % : x  Auto Lymphocyte % : x  Auto Monocyte % : x  Auto Eosinophil % : x  Auto Basophil % : x    07-25    136  |  102  |  27<H>  ----------------------------<  126<H>  3.8   |  22  |  1.01    Ca    9.1      25 Jul 2022 05:30  Phos  3.1     07-25  Mg     1.9     07-25                          RADIOLOGY & ADDITIONAL STUDIES (The following images were personally reviewed):

## 2022-07-25 NOTE — PROGRESS NOTE ADULT - PROBLEM SELECTOR PROBLEM 8
H/O aortic valve disease

## 2022-07-25 NOTE — CHART NOTE - NSCHARTNOTEFT_GEN_A_CORE
Admitting Diagnosis:   Patient is a 77y old  Male who presents with a chief complaint of Critical Limb Ischemia (2022 11:39)      PAST MEDICAL & SURGICAL HISTORY:  HTN (hypertension)      High cholesterol      DM (diabetes mellitus)      Popliteal aneurysm      Pacemaker      S/P TAVR (transcatheter aortic valve replacement)          Current Nutrition Order:  CONSCHO Kosher Diet      PO Intake: Good (%) [   ]  Fair (50-75%) [   ] Poor (<25%) [   ]-- Please See Below     GI Issues:   Unclear if having diarrhea, During prior RD visit noted c/o constipation  Prior bowel reg is now d/c and pt ordered for Imodium     Pain:  none per flow sheets    Skin Integrity:  Aiden 15, 2+ L Leg Edema, No pressure ulcers-SX site noted   *Vit C ordered     Labs:       136  |  102  |  27<H>  ----------------------------<  126<H>  3.8   |  22  |  1.01    Ca    9.1      2022 05:30  Phos  3.1     07-25  Mg     1.9     07-25      CAPILLARY BLOOD GLUCOSE      POCT Blood Glucose.: 151 mg/dL (2022 11:31)  POCT Blood Glucose.: 132 mg/dL (2022 07:15)  POCT Blood Glucose.: 187 mg/dL (2022 21:39)  POCT Blood Glucose.: 128 mg/dL (2022 17:34)  POCT Blood Glucose.: 158 mg/dL (2022 12:06)      Medications:  MEDICATIONS  (STANDING):  albuterol/ipratropium for Nebulization 3 milliLiter(s) Nebulizer every 6 hours  allopurinol 100 milliGRAM(s) Oral every 24 hours  apixaban 5 milliGRAM(s) Oral every 12 hours  ascorbic acid 500 milliGRAM(s) Oral daily  aspirin  chewable 81 milliGRAM(s) Oral daily  atorvastatin 40 milliGRAM(s) Oral at bedtime  carBAMazepine 200 milliGRAM(s) Oral two times a day  chlorhexidine 2% Cloths 1 Application(s) Topical <User Schedule>  dextrose 5%. 1000 milliLiter(s) (50 mL/Hr) IV Continuous <Continuous>  dextrose 5%. 1000 milliLiter(s) (50 mL/Hr) IV Continuous <Continuous>  dextrose 5%. 1000 milliLiter(s) (100 mL/Hr) IV Continuous <Continuous>  dextrose 5%. 1000 milliLiter(s) (100 mL/Hr) IV Continuous <Continuous>  dextrose 50% Injectable 25 Gram(s) IV Push once  dextrose 50% Injectable 12.5 Gram(s) IV Push once  dextrose 50% Injectable 25 Gram(s) IV Push once  dextrose 50% Injectable 25 Gram(s) IV Push once  dextrose 50% Injectable 12.5 Gram(s) IV Push once  dextrose 50% Injectable 25 Gram(s) IV Push once  glucagon  Injectable 1 milliGRAM(s) IntraMuscular once  glucagon  Injectable 1 milliGRAM(s) IntraMuscular once  insulin lispro (ADMELOG) corrective regimen sliding scale   SubCutaneous Before meals and at bedtime  loperamide 2 milliGRAM(s) Oral daily  losartan 50 milliGRAM(s) Oral daily  NIFEdipine XL 60 milliGRAM(s) Oral every 24 hours  pantoprazole    Tablet 40 milliGRAM(s) Oral before breakfast  propranolol 10 milliGRAM(s) Oral three times a day  tamsulosin 0.4 milliGRAM(s) Oral at bedtime  traZODone 300 milliGRAM(s) Oral at bedtime  venlafaxine XR. 37.5 milliGRAM(s) Oral every 12 hours    MEDICATIONS  (PRN):  dextrose Oral Gel 15 Gram(s) Oral once PRN Blood Glucose LESS THAN 70 milliGRAM(s)/deciliter  dextrose Oral Gel 15 Gram(s) Oral once PRN Blood Glucose LESS THAN 70 milliGRAM(s)/deciliter      5'10''  pounds +-10%  Wt 187 pounds, BMI 26.9, %[id number]    Weight Change:    187.3 pounds     Estimated energy needs:   Current body wt used for energy calculations as pt falls within % IBW  Adjust for age, Skin, Pending OR; fluids per team based on low sodium  25-30kcal/k-2550kcal/day  1.0-1.2gm/k-102gm/kg     Subjective: 77M hx HTN, HLD, DM, Pacemaker, PVD, TAVR, c/o left leg pain x2 days. 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 Pt went to Urgent Care after pain started 2 days prior, he was then referred to the ED for further mgmt once it was established there was no flow to affected limb along with leg being mottled and cool. S/p Insertion, catheter, dialysis circuit, with diagnostic angiogram 7/15. S/p Creation of femoropopliteal arterial bypass in left lower extremity using vein graft . Pt post-op course c/b metabolic encephalopathy and respiratory failure. Transferred to SICU for monitoring. Extubated . Transferred to [facility] on  for PT and discharge planning.  Medically ready as of today. D/c Pending to [facility].     Pt seen this AM on [facility], family at bedside. Ordered for CONSCHO/Kosher diet PTA. Pt reports he is eating however does not like [facility] food. Also reports he does not want to eat too  much and have to use the bathroom. Unclear if having diarrhea. During prior RD visit noted c/o constipation. Prior bowel reg is now d/c and pt ordered for Imodium.   Please see below for nutritions recommendations.    Prior PES: Inadequate Energy Intake RT intake<EER AEB NPO  >> d/c, no longer NPO  New PES: Increased needs RT increased Demands AEB Post op needs  Goal: Meet ~75% EER with good tolerance     Recommendations:  1. Cont with Diet.  2. Monitor %PO intake, Diet toleance, s/s of issues chewing/swallowing.  3. Labs: monitor BMP, CBC, glucose, lytes, trend renal indices, LFTs, POCT.  4. Pain/GI per team.  5. Monitor wts, Skin.  6. MVI.   7. RD to remain available for additional nutrition interventions as needed.     Education:   Encouraged PO intake during meals & reviewed importance. Spoke about Tips for GI distress. Understanding stated, provide additional motivation as needed.     Risk Level: High [   ] Moderate [ X  ] Low [   ]

## 2022-07-25 NOTE — DISCHARGE NOTE NURSING/CASE MANAGEMENT/SOCIAL WORK - NSDCPEFALRISK_GEN_ALL_CORE
For information on Fall & Injury Prevention, visit: [email or web address] OR  [email or web address] OR  https://www.cdc.gov/steadi/patient.html

## 2022-07-25 NOTE — DISCHARGE NOTE PROVIDER - NSDCMRMEDTOKEN_GEN_ALL_CORE_FT
allopurinol 100 mg oral tablet: 1 tab(s) orally once a day  atorvastatin 40 mg oral tablet: 1 tab(s) orally once a day  carBAMazepine 200 mg oral tablet: 1 tab(s) orally 2 times a day  chlorthalidone 25 mg oral tablet: 1 tab(s) orally once a day  Eliquis 5 mg oral tablet: 1 tab(s) orally 2 times a day  fenofibrate 145 mg oral tablet: 1 tab(s) orally once a day  Flomax 0.4 mg oral capsule: 0.4 cap(s) orally once a day  Icosapent Ethyl 1 g oral capsule: 2 cap(s) orally 2 times a day  losartan 100 mg oral tablet: 1 tab(s) orally once a day  NIFEdipine 60 mg oral tablet, extended release: 1 tab(s) orally once a day  pantoprazole 40 mg oral delayed release tablet: 1 tab(s) orally once a day  propranolol 10 mg oral tablet: 1 tab(s) orally 3 times a day  traZODone 150 mg oral tablet: 2 tab(s) orally once (at bedtime)  venlafaxine 37.5 mg oral tablet: 1 tab(s) orally 2 times a day   allopurinol 100 mg oral tablet: 1 tab(s) orally once a day  ascorbic acid 500 mg oral tablet: 1 tab(s) orally once a day  aspirin 81 mg oral tablet, chewable: 1 tab(s) orally once a day  atorvastatin 40 mg oral tablet: 1 tab(s) orally once a day  carBAMazepine 200 mg oral tablet: 1 tab(s) orally 2 times a day  chlorthalidone 25 mg oral tablet: 1 tab(s) orally once a day  Eliquis 5 mg oral tablet: 1 tab(s) orally 2 times a day  fenofibrate 145 mg oral tablet: 1 tab(s) orally once a day  Flomax 0.4 mg oral capsule: 0.4 cap(s) orally once a day  Icosapent Ethyl 1 g oral capsule: 2 cap(s) orally 2 times a day  losartan 100 mg oral tablet: 1 tab(s) orally once a day  NIFEdipine 60 mg oral tablet, extended release: 1 tab(s) orally once a day  pantoprazole 40 mg oral delayed release tablet: 1 tab(s) orally once a day  propranolol 10 mg oral tablet: 1 tab(s) orally 3 times a day  traZODone 150 mg oral tablet: 2 tab(s) orally once (at bedtime)  venlafaxine 37.5 mg oral tablet: 1 tab(s) orally 2 times a day

## 2022-07-25 NOTE — DISCHARGE NOTE PROVIDER - CARE PROVIDER_API CALL
Eli Capone)  Surgery; Vascular Surgery  [address], [place] [address]  Phone: [phone]  Fax: [phone]  Follow Up Time:

## 2022-07-25 NOTE — PROGRESS NOTE ADULT - PROBLEM SELECTOR PROBLEM 4
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

## 2022-07-26 LAB — SURGICAL PATHOLOGY STUDY: SIGNIFICANT CHANGE UP

## 2022-08-03 DIAGNOSIS — Z79.01 LONG TERM (CURRENT) USE OF ANTICOAGULANTS: ICD-10-CM

## 2022-08-03 DIAGNOSIS — M62.82 RHABDOMYOLYSIS: ICD-10-CM

## 2022-08-03 DIAGNOSIS — Z87.891 PERSONAL HISTORY OF NICOTINE DEPENDENCE: ICD-10-CM

## 2022-08-03 DIAGNOSIS — J44.9 CHRONIC OBSTRUCTIVE PULMONARY DISEASE, UNSPECIFIED: ICD-10-CM

## 2022-08-03 DIAGNOSIS — L97.929 NON-PRESSURE CHRONIC ULCER OF UNSPECIFIED PART OF LEFT LOWER LEG WITH UNSPECIFIED SEVERITY: ICD-10-CM

## 2022-08-03 DIAGNOSIS — F31.9 BIPOLAR DISORDER, UNSPECIFIED: ICD-10-CM

## 2022-08-03 DIAGNOSIS — I48.91 UNSPECIFIED ATRIAL FIBRILLATION: ICD-10-CM
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
## 2022-08-03 DIAGNOSIS — Z95.0 PRESENCE OF CARDIAC PACEMAKER: ICD-10-CM

## 2022-08-03 DIAGNOSIS — Z95.2 PRESENCE OF PROSTHETIC HEART VALVE: ICD-10-CM

## 2022-08-03 DIAGNOSIS — D50.9 IRON DEFICIENCY ANEMIA, UNSPECIFIED: ICD-10-CM

## 2022-08-03 DIAGNOSIS — J96.01 ACUTE RESPIRATORY FAILURE WITH HYPOXIA: ICD-10-CM

## 2022-08-03 DIAGNOSIS — E78.5 HYPERLIPIDEMIA, UNSPECIFIED: ICD-10-CM

## 2022-08-03 DIAGNOSIS — E11.51 TYPE 2 DIABETES MELLITUS WITH DIABETIC PERIPHERAL ANGIOPATHY WITHOUT GANGRENE: ICD-10-CM

## 2022-08-03 DIAGNOSIS — I70.249 ATHEROSCLEROSIS OF NATIVE ARTERIES OF LEFT LEG WITH ULCERATION OF UNSPECIFIED SITE: ICD-10-CM

## 2022-08-03 DIAGNOSIS — G93.41 METABOLIC ENCEPHALOPATHY: ICD-10-CM

## 2022-08-03 DIAGNOSIS — T82.868A THROMBOSIS DUE TO VASCULAR PROSTHETIC DEVICES, IMPLANTS AND GRAFTS, INITIAL ENCOUNTER: ICD-10-CM

## 2022-08-03 DIAGNOSIS — N17.9 ACUTE KIDNEY FAILURE, UNSPECIFIED: ICD-10-CM

## 2022-08-03 DIAGNOSIS — I11.0 HYPERTENSIVE HEART DISEASE WITH HEART FAILURE: ICD-10-CM

## 2022-08-03 DIAGNOSIS — F05 DELIRIUM DUE TO KNOWN PHYSIOLOGICAL CONDITION: ICD-10-CM

## 2022-08-03 DIAGNOSIS — I74.3 EMBOLISM AND THROMBOSIS OF ARTERIES OF THE LOWER EXTREMITIES: ICD-10-CM
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
## 2022-08-03 DIAGNOSIS — Z78.1 PHYSICAL RESTRAINT STATUS: ICD-10-CM

## 2022-08-03 DIAGNOSIS — E83.42 HYPOMAGNESEMIA: ICD-10-CM

## 2022-08-03 DIAGNOSIS — K21.9 GASTRO-ESOPHAGEAL REFLUX DISEASE WITHOUT ESOPHAGITIS: ICD-10-CM

## 2022-08-03 DIAGNOSIS — I50.30 UNSPECIFIED DIASTOLIC (CONGESTIVE) HEART FAILURE: ICD-10-CM

## 2022-08-05 ENCOUNTER — APPOINTMENT (OUTPATIENT)
Dept: VASCULAR SURGERY | Facility: CLINIC | Age: 77
End: 2022-08-05

## 2022-08-05 DIAGNOSIS — Z86.39 PERSONAL HISTORY OF OTHER ENDOCRINE, NUTRITIONAL AND METABOLIC DISEASE: ICD-10-CM

## 2022-08-05 DIAGNOSIS — I10 ESSENTIAL (PRIMARY) HYPERTENSION: ICD-10-CM

## 2022-08-05 PROCEDURE — 93926 LOWER EXTREMITY STUDY: CPT

## 2022-08-05 PROCEDURE — 99024 POSTOP FOLLOW-UP VISIT: CPT

## 2022-08-05 RX ORDER — APIXABAN 5 MG/1
5 TABLET, FILM COATED ORAL
Qty: 60 | Refills: 0 | Status: ACTIVE | COMMUNITY
Start: 2022-05-13

## 2022-08-05 RX ORDER — FLUTICASONE FUROATE, UMECLIDINIUM BROMIDE AND VILANTEROL TRIFENATATE 200; 62.5; 25 UG/1; UG/1; UG/1
200-62.5-25 POWDER RESPIRATORY (INHALATION)
Qty: 180 | Refills: 0 | Status: ACTIVE | COMMUNITY
Start: 2021-08-20

## 2022-08-05 RX ORDER — VENLAFAXINE HYDROCHLORIDE 37.5 MG/1
37.5 CAPSULE, EXTENDED RELEASE ORAL
Qty: 60 | Refills: 0 | Status: ACTIVE | COMMUNITY
Start: 2021-08-20

## 2022-08-05 RX ORDER — TAMSULOSIN HYDROCHLORIDE 0.4 MG/1
0.4 CAPSULE ORAL
Qty: 60 | Refills: 0 | Status: ACTIVE | COMMUNITY
Start: 2022-07-12

## 2022-08-05 RX ORDER — ICOSAPENT ETHYL 1 G/1
1 CAPSULE ORAL
Qty: 120 | Refills: 0 | Status: ACTIVE | COMMUNITY
Start: 2022-04-12

## 2022-08-05 RX ORDER — PROPRANOLOL HYDROCHLORIDE 10 MG/1
10 TABLET ORAL
Qty: 90 | Refills: 0 | Status: ACTIVE | COMMUNITY
Start: 2022-06-12

## 2022-08-05 RX ORDER — PANTOPRAZOLE 40 MG/1
40 TABLET, DELAYED RELEASE ORAL
Qty: 30 | Refills: 0 | Status: ACTIVE | COMMUNITY
Start: 2022-05-10

## 2022-08-05 RX ORDER — ATORVASTATIN CALCIUM 20 MG/1
20 TABLET, FILM COATED ORAL
Qty: 30 | Refills: 0 | Status: COMPLETED | COMMUNITY
Start: 2021-09-10 | End: 2022-08-05

## 2022-08-05 RX ORDER — CHLORTHALIDONE 25 MG/1
25 TABLET ORAL
Qty: 30 | Refills: 0 | Status: ACTIVE | COMMUNITY
Start: 2022-07-11

## 2022-08-05 RX ORDER — CARBAMAZEPINE 200 MG/1
200 TABLET ORAL
Qty: 120 | Refills: 0 | Status: ACTIVE | COMMUNITY
Start: 2022-06-12

## 2022-08-05 RX ORDER — FENOFIBRATE 145 MG/1
145 TABLET, COATED ORAL
Qty: 30 | Refills: 0 | Status: ACTIVE | COMMUNITY
Start: 2021-09-10

## 2022-08-05 RX ORDER — DAPAGLIFLOZIN 10 MG/1
10 TABLET, FILM COATED ORAL
Qty: 30 | Refills: 0 | Status: COMPLETED | COMMUNITY
Start: 2022-05-13 | End: 2022-08-05

## 2022-08-05 RX ORDER — AMOXICILLIN 875 MG/1
875 TABLET, FILM COATED ORAL
Qty: 20 | Refills: 0 | Status: COMPLETED | COMMUNITY
Start: 2022-07-11 | End: 2022-08-05

## 2022-08-05 RX ORDER — ALLOPURINOL 100 MG/1
100 TABLET ORAL
Qty: 30 | Refills: 0 | Status: ACTIVE | COMMUNITY
Start: 2022-06-12

## 2022-08-05 RX ORDER — ALBUTEROL SULFATE 90 UG/1
108 (90 BASE) INHALANT RESPIRATORY (INHALATION)
Qty: 6 | Refills: 0 | Status: ACTIVE | COMMUNITY
Start: 2022-03-08

## 2022-08-05 RX ORDER — LOSARTAN POTASSIUM 100 MG/1
100 TABLET, FILM COATED ORAL
Qty: 30 | Refills: 0 | Status: ACTIVE | COMMUNITY
Start: 2021-08-20

## 2022-08-05 RX ORDER — SEMAGLUTIDE 1.34 MG/ML
2 INJECTION, SOLUTION SUBCUTANEOUS
Qty: 4 | Refills: 0 | Status: ACTIVE | COMMUNITY
Start: 2022-06-20

## 2022-08-05 RX ORDER — CHLORHEXIDINE GLUCONATE, 0.12% ORAL RINSE 1.2 MG/ML
0.12 SOLUTION DENTAL
Qty: 473 | Refills: 0 | Status: ACTIVE | COMMUNITY
Start: 2022-07-11

## 2022-08-05 RX ORDER — ATORVASTATIN CALCIUM 40 MG/1
40 TABLET, FILM COATED ORAL
Qty: 90 | Refills: 0 | Status: ACTIVE | COMMUNITY
Start: 2022-06-20

## 2022-08-05 RX ORDER — TRAZODONE HYDROCHLORIDE 150 MG/1
150 TABLET ORAL
Qty: 60 | Refills: 0 | Status: ACTIVE | COMMUNITY
Start: 2022-07-12

## 2022-08-05 RX ORDER — NIFEDIPINE 60 MG/1
60 TABLET, EXTENDED RELEASE ORAL
Qty: 30 | Refills: 0 | Status: ACTIVE | COMMUNITY
Start: 2021-07-15

## 2022-08-05 NOTE — REVIEW OF SYSTEMS
[As noted in HPI] : as noted in HPI [Lower Ext Edema] : lower extremity edema [Limb Pain] : limb pain [Limb Swelling] : limb swelling [As Noted in HPI] : as noted in HPI [Skin Wound] : skin wound [Negative] : Heme/Lymph

## 2022-08-05 NOTE — PHYSICAL EXAM
[JVD] : no jugular venous distention  [Normal Thyroid] : the thyroid was normal [Carotid Bruits] : no carotid bruits [Normal Breath Sounds] : Normal breath sounds [Respiratory Effort] : normal respiratory effort [Normal Heart Sounds] : normal heart sounds [Normal Rate and Rhythm] : normal rate and rhythm [Right Carotid Bruit] : no bruit heard over the right carotid [Left Carotid Bruit] : no bruit heard over the left carotid [2+] : right 2+ [0] : right 0 [1+] : left 1+ [Ankle Swelling (On Exam)] : present [Ankle Swelling On The Left] : moderate [Varicose Veins Of Lower Extremities] : not present [] : not present [Abdomen Masses] : No abdominal masses [Abdomen Tenderness] : ~T ~M No abdominal tenderness [Purpura] : no purpura  [Petechiae] : no petechiae [Skin Ulcer] : ulcer [Skin Induration] : no induration [Alert] : alert [Calm] : calm [de-identified] : Well-nourished, NAD [de-identified] : NC/AT, anicteric [FreeTextEntry1] : Feet warm to touch b/l [de-identified] : FROM throughout, strength 5/5, no palpable cords in the LEs [de-identified] : L lower leg 2+ non-pitting edema, tension on the incision noted, sutures and staples in place, but serous drainage noted from the incision, focal skin dehiscence noted at the level of the L knee; hyperemia appreciated at the suture/staple sites, non-tender

## 2022-08-05 NOTE — REASON FOR VISIT
[de-identified] : LLE arterial thrombectomy of TP trunk/pop a via cutdown, thrombolysis, L SFA to TP trunk bypass w/autologous rGSV [de-identified] : 07/18/2022 [de-identified] : 18 [de-identified] : 2 [de-identified] : 77yoM who presented to [facility] 2d after being seen in an outside ED for paresthesias/anesthesia in the L foot due to arterial occlusion requiring BKA.  Pt admitted, then underwent LLE arterial thrombectomy of TP trunk/pop a via cutdown, thrombolysis, L SFA to TP trunk bypass w/autologous rGSV, returning for POC.  Currently, pt reports some discomfort in the leg, +heaviness and swelling in the lower leg w/steady drainage noted from the vein harvest site.  Wound nurse at his rehab facility has added betadine paint to the incision, and pt started taking Keflex 500mg TID for redness at the incision.  He denies fevers/chills/malaise.

## 2022-08-05 NOTE — DISCUSSION/SUMMARY
[FreeTextEntry1] : 77yoM who presented to [facility] 2d after being seen in an outside ED for paresthesias/anesthesia in the L foot due to arterial occlusion requiring BKA.  Pt admitted, then underwent LLE arterial thrombectomy of TP trunk/pop a via cutdown, thrombolysis, L SFA to TP trunk bypass w/autologous rGSV, returning for POC.  Currently, pt reports some discomfort in the leg, +heaviness and swelling in the lower leg w/steady drainage noted from the vein harvest site.  Wound nurse at his rehab facility has added betadine paint to the incision, and pt started taking Keflex 500mg TID for redness at the incision.  He denies fevers/chills/malaise.\par \par On exam, L lower leg 2+ non-pitting edema, tension on the incision noted, sutures and staples in place, but serous drainage noted from the incision, focal skin dehiscence noted at the level of the L knee; hyperemia appreciated at the suture/staple sites, non-tender.  Duplex performed today to evaluate LLE bypass for patency reveals widely patent L SFA-TP trunk bypass w/vein, no wali-bypass fluid noted.  Dehiscence packed w/betadine and vein harvest incision painted w/betadine.  Reassured pt that w/ambulation/rehab and leg elevation, his swelling will improve, and his dehiscence/drainage will resolve w/betadine paint and iodoform 1/4" packing inserted into the dead space.\par \par Pt will RTO In 3wks for suture/staple removal, but will finish a 10d course of keflex and continue PT.\par

## 2022-08-18 NOTE — PHYSICAL THERAPY INITIAL EVALUATION ADULT - PHYSICAL ASSIST/NONPHYSICAL ASSIST: SIT/STAND, REHAB EVAL
Problem: Physical Therapy  Goal: Physical Therapy Goal  Description: Goals to be met by: 22     Patient will increase functional independence with mobility by performin. Supine to sit with Modified [place]  2. Sit to supine with Modified [place]  3. Sit to stand transfer with Supervision  4. Bed to chair transfer with Supervision using Rolling Walker  5. Gait  x 200 feet with Supervision using Rolling Walker.     Outcome: Ongoing, Progressing     PT Eval completed, note to follow. Pt ambulated ~60 ft with RW and CGA. Pt demonstrating decreased speed/emani and increased forward flexed posture. Pt on 3 L O2 and SpO2 >97% throughout session. HR in the high 50s/low 60s at rest and 80s with activity. Recommending HH PT/OT upon d/c home with transition to cardiac rehab. DME needs: [employer].    verbal cues/1 person assist

## 2022-08-23 ENCOUNTER — APPOINTMENT (OUTPATIENT)
Dept: VASCULAR SURGERY | Facility: CLINIC | Age: 77
End: 2022-08-23

## 2022-08-23 PROCEDURE — 99213 OFFICE O/P EST LOW 20 MIN: CPT

## 2022-08-23 PROCEDURE — 99024 POSTOP FOLLOW-UP VISIT: CPT

## 2022-08-26 NOTE — PHYSICAL EXAM
[Normal Thyroid] : the thyroid was normal [Normal Breath Sounds] : Normal breath sounds [Respiratory Effort] : normal respiratory effort [Normal Heart Sounds] : normal heart sounds [Normal Rate and Rhythm] : normal rate and rhythm [2+] : right 2+ [0] : right 0 [1+] : left 1+ [Ankle Swelling (On Exam)] : present [Ankle Swelling On The Left] : moderate [Skin Ulcer] : ulcer [Alert] : alert [Calm] : calm [JVD] : no jugular venous distention  [Carotid Bruits] : no carotid bruits [Right Carotid Bruit] : no bruit heard over the right carotid [Left Carotid Bruit] : no bruit heard over the left carotid [Varicose Veins Of Lower Extremities] : not present [] : not present [Abdomen Masses] : No abdominal masses [Abdomen Tenderness] : ~T ~M No abdominal tenderness [Purpura] : no purpura  [Petechiae] : no petechiae [Skin Induration] : no induration [de-identified] : Well-nourished, NAD [de-identified] : NC/AT, anicteric [FreeTextEntry1] : Feet warm to touch b/l [de-identified] : FROM throughout, strength 5/5, no palpable cords in the LEs [de-identified] :  L lower leg 2+ non-pitting edema, and vein harvest site dehisced w/open wound granulating well w/20% slough noted at the central segment of wound.  Wound currently measures [id number], moderate drainage, no periwound erythema

## 2022-08-26 NOTE — DISCUSSION/SUMMARY
[FreeTextEntry1] : 77yoM who presented to [facility] 2d after being seen in an outside ED for paresthesias/anesthesia in the L foot due to arterial occlusion requiring BKA.  Pt admitted, then underwent LLE arterial thrombectomy of TP trunk/pop a via cutdown, thrombolysis, L SFA to TP trunk bypass w/autologous rGSV, returning for POC.  Currently, pt reports some discomfort in the leg, +heaviness and swelling in the lower leg w/steady drainage noted from the vein harvest site and dehiscence noted at the distal vein harvest site.  Pt currently at home, wound care performed by a private wound care nurse who has been debriding the wound and treating w/hydrofera blue w/o issue.  Pt reports significant swelling but little pain, no fevers/chills.\par \par On exam, L lower leg 2+ non-pitting edema, and vein harvest site dehisced w/open wound granulating well w/20% slough noted at the central segment of wound.  Wound currently measures [id number], moderate drainage, no periwound erythema.  Will plan for cleansing w/chlorhexidine TIW followed by wound vac to the site and ACE bandage for compression.  Pt will RTO in 2wks after starting the Vac.

## 2022-08-26 NOTE — ADDENDUM
[FreeTextEntry1] : This note was written by Bryon Crabtree, acting as a scribe for Dr. Eli Capone.  I, Dr. Eli Capone, have read and attest that all the information, medical decision-making, and discharge instructions within are true and accurate.\par \par I, Dr. Eli Capone, personally performed the evaluation and management (E/M) services for this established patient who presents today with (an) existing condition(s).  That E/M includes conducting the examination, assessing all conditions, and (re)establishing/reinforcing a plan of care.  Today, my ACP, Bryon Crabtree, was here to observe my evaluation and management services for this condition to be followed going forward.

## 2022-08-26 NOTE — REASON FOR VISIT
[de-identified] : LLE arterial thrombectomy of TP trunk/pop a via cutdown, thrombolysis, L SFA to TP trunk bypass w/autologous rGSV [de-identified] : 07/18/2022 [de-identified] : 36 [de-identified] : 7 [de-identified] : 77yoM who presented to [facility] 2d after being seen in an outside ED for paresthesias/anesthesia in the L foot due to arterial occlusion requiring BKA.  Pt admitted, then underwent LLE arterial thrombectomy of TP trunk/pop a via cutdown, thrombolysis, L SFA to TP trunk bypass w/autologous rGSV, returning for POC.  Currently, pt reports some discomfort in the leg, +heaviness and swelling in the lower leg w/steady drainage noted from the vein harvest site and dehiscence noted at the distal vein harvest site.  Pt currently at home, wound care performed by a private wound care nurse who has been debriding the wound and treating w/hydrofera blue w/o issue.  Pt reports significant swelling but little pain, no fevers/chills.

## 2022-09-06 ENCOUNTER — APPOINTMENT (OUTPATIENT)
Dept: VASCULAR SURGERY | Facility: CLINIC | Age: 77
End: 2022-09-06

## 2022-09-06 VITALS
DIASTOLIC BLOOD PRESSURE: 79 MMHG | SYSTOLIC BLOOD PRESSURE: 146 MMHG | HEART RATE: 60 BPM | BODY MASS INDEX: 25.62 KG/M2 | WEIGHT: 179 LBS | HEIGHT: 70 IN

## 2022-09-06 PROCEDURE — 99213 OFFICE O/P EST LOW 20 MIN: CPT

## 2022-09-06 RX ORDER — OXYCODONE AND ACETAMINOPHEN 5; 325 MG/1; MG/1
5-325 TABLET ORAL
Qty: 30 | Refills: 0 | Status: ACTIVE | COMMUNITY
Start: 2022-09-06 | End: 1900-01-01

## 2022-09-09 NOTE — DISCUSSION/SUMMARY
[FreeTextEntry1] : 77yoM who presented to [facility] 2d after being seen in an outside ED for paresthesias/anesthesia in the L foot due to arterial occlusion requiring BKA.  Pt admitted, then underwent LLE arterial thrombectomy of TP trunk/pop a via cutdown, thrombolysis, L SFA to TP trunk bypass w/autologous rGSV, returning for POC.  Currently, pt reports some discomfort in the leg, +heaviness and swelling in the lower leg w/steady drainage noted from the vein harvest site and dehiscence noted at the distal vein harvest site.  Pt currently at home, wound care performed by a private wound care nurse who has been applying [employer] Vac dressings TIW w/significant improvement, as per pt and wife.\par \par On exam, L lower leg 2+ non-pitting edema, and vein harvest site dehisced w/open wound granulating well w/minimal slough noted at the central segment of wound.  Wound currently measures 5x13x0.5cm, moderate drainage and bleeding, no periwound erythema.  Will plan for cleansing w/chlorhexidine TIW followed by wound vac to the site and ACE bandage for compression.  Pt will RTO in 3wks for reevaluation.

## 2022-09-09 NOTE — PHYSICAL EXAM
[Normal Thyroid] : the thyroid was normal [Normal Breath Sounds] : Normal breath sounds [Respiratory Effort] : normal respiratory effort [Normal Heart Sounds] : normal heart sounds [Normal Rate and Rhythm] : normal rate and rhythm [2+] : right 2+ [0] : right 0 [1+] : left 1+ [Ankle Swelling (On Exam)] : present [Ankle Swelling On The Left] : moderate [Skin Ulcer] : ulcer [Alert] : alert [Calm] : calm [JVD] : no jugular venous distention  [Carotid Bruits] : no carotid bruits [Right Carotid Bruit] : no bruit heard over the right carotid [Left Carotid Bruit] : no bruit heard over the left carotid [Varicose Veins Of Lower Extremities] : not present [] : not present [Abdomen Masses] : No abdominal masses [Abdomen Tenderness] : ~T ~M No abdominal tenderness [Purpura] : no purpura  [Petechiae] : no petechiae [Skin Induration] : no induration [de-identified] : Well-nourished, NAD [de-identified] : NC/AT, anicteric [FreeTextEntry1] : Feet warm to touch b/l [de-identified] : FROM throughout, strength 5/5, no palpable cords in the LEs [de-identified] :  L lower leg 2+ non-pitting edema, and vein harvest site dehisced w/open wound granulating well w/minimal slough noted at the central segment of wound.  Wound currently measures 5x13x0.5cm, moderate drainage, no periwound erythema

## 2022-09-09 NOTE — REASON FOR VISIT
[de-identified] : LLE arterial thrombectomy of TP trunk/pop a via cutdown, thrombolysis, L SFA to TP trunk bypass w/autologous rGSV [de-identified] : 07/18/2022 [de-identified] : 8 [de-identified] : 77yoM who presented to [facility] 2d after being seen in an outside ED for paresthesias/anesthesia in the L foot due to arterial occlusion requiring BKA.  Pt admitted, then underwent LLE arterial thrombectomy of TP trunk/pop a via cutdown, thrombolysis, L SFA to TP trunk bypass w/autologous rGSV, returning for POC.  Currently, pt reports some discomfort in the leg, +heaviness and swelling in the lower leg w/steady drainage noted from the vein harvest site and dehiscence noted at the distal vein harvest site.  Pt currently at home, wound care performed by a private wound care nurse who has been applying [employer]CI Vac dressings TIW w/significant improvement, as per pt and wife.

## 2022-09-29 ENCOUNTER — EMERGENCY (EMERGENCY)
Facility: HOSPITAL | Age: 77
LOS: 1 days | Discharge: HOME | End: 2022-09-29
Attending: EMERGENCY MEDICINE | Admitting: EMERGENCY MEDICINE
Payer: MEDICARE

## 2022-09-29 ENCOUNTER — APPOINTMENT (OUTPATIENT)
Dept: VASCULAR SURGERY | Facility: CLINIC | Age: 77
End: 2022-09-29

## 2022-09-29 VITALS
OXYGEN SATURATION: 95 % | TEMPERATURE: 98 F | HEART RATE: 62 BPM | RESPIRATION RATE: 18 BRPM | DIASTOLIC BLOOD PRESSURE: 60 MMHG | SYSTOLIC BLOOD PRESSURE: 134 MMHG

## 2022-09-29 VITALS
HEIGHT: 70 IN | DIASTOLIC BLOOD PRESSURE: 60 MMHG | WEIGHT: 184.97 LBS | TEMPERATURE: 98 F | HEART RATE: 60 BPM | OXYGEN SATURATION: 95 % | RESPIRATION RATE: 18 BRPM | SYSTOLIC BLOOD PRESSURE: 140 MMHG

## 2022-09-29 DIAGNOSIS — Z95.0 PRESENCE OF CARDIAC PACEMAKER: Chronic | ICD-10-CM

## 2022-09-29 DIAGNOSIS — I72.4 ANEURYSM OF ARTERY OF LOWER EXTREMITY: Chronic | ICD-10-CM

## 2022-09-29 DIAGNOSIS — Z95.2 PRESENCE OF PROSTHETIC HEART VALVE: Chronic | ICD-10-CM

## 2022-09-29 LAB
ALBUMIN SERPL ELPH-MCNC: 4 G/DL — SIGNIFICANT CHANGE UP (ref 3.3–5)
ALP SERPL-CCNC: 84 U/L — SIGNIFICANT CHANGE UP (ref 40–120)
ALT FLD-CCNC: 8 U/L — LOW (ref 10–45)
ANION GAP SERPL CALC-SCNC: 14 MMOL/L — SIGNIFICANT CHANGE UP (ref 5–17)
APTT BLD: 35.9 SEC — HIGH (ref 27.5–35.5)
AST SERPL-CCNC: 11 U/L — SIGNIFICANT CHANGE UP (ref 10–40)
BASOPHILS # BLD AUTO: 0.04 K/UL — SIGNIFICANT CHANGE UP (ref 0–0.2)
BASOPHILS NFR BLD AUTO: 0.5 % — SIGNIFICANT CHANGE UP (ref 0–2)
BILIRUB SERPL-MCNC: 0.2 MG/DL — SIGNIFICANT CHANGE UP (ref 0.2–1.2)
BUN SERPL-MCNC: 31 MG/DL — HIGH (ref 7–23)
CALCIUM SERPL-MCNC: 9.9 MG/DL — SIGNIFICANT CHANGE UP (ref 8.4–10.5)
CHLORIDE SERPL-SCNC: 102 MMOL/L — SIGNIFICANT CHANGE UP (ref 96–108)
CO2 SERPL-SCNC: 23 MMOL/L — SIGNIFICANT CHANGE UP (ref 22–31)
CREAT SERPL-MCNC: 1.32 MG/DL — HIGH (ref 0.5–1.3)
EGFR: 55 ML/MIN/1.73M2 — LOW
EOSINOPHIL # BLD AUTO: 0.09 K/UL — SIGNIFICANT CHANGE UP (ref 0–0.5)
EOSINOPHIL NFR BLD AUTO: 1 % — SIGNIFICANT CHANGE UP (ref 0–6)
GLUCOSE SERPL-MCNC: 200 MG/DL — HIGH (ref 70–99)
HCT VFR BLD CALC: 30.3 % — LOW (ref 39–50)
HGB BLD-MCNC: 9.6 G/DL — LOW (ref 13–17)
IMM GRANULOCYTES NFR BLD AUTO: 0.7 % — SIGNIFICANT CHANGE UP (ref 0–0.9)
INR BLD: 1.3 — HIGH (ref 0.88–1.16)
LACTATE SERPL-SCNC: 1.5 MMOL/L — SIGNIFICANT CHANGE UP (ref 0.5–2)
LYMPHOCYTES # BLD AUTO: 1.06 K/UL — SIGNIFICANT CHANGE UP (ref 1–3.3)
LYMPHOCYTES # BLD AUTO: 12.3 % — LOW (ref 13–44)
MCHC RBC-ENTMCNC: 30.2 PG — SIGNIFICANT CHANGE UP (ref 27–34)
MCHC RBC-ENTMCNC: 31.7 GM/DL — LOW (ref 32–36)
MCV RBC AUTO: 95.3 FL — SIGNIFICANT CHANGE UP (ref 80–100)
MONOCYTES # BLD AUTO: 0.81 K/UL — SIGNIFICANT CHANGE UP (ref 0–0.9)
MONOCYTES NFR BLD AUTO: 9.4 % — SIGNIFICANT CHANGE UP (ref 2–14)
NEUTROPHILS # BLD AUTO: 6.53 K/UL — SIGNIFICANT CHANGE UP (ref 1.8–7.4)
NEUTROPHILS NFR BLD AUTO: 76.1 % — SIGNIFICANT CHANGE UP (ref 43–77)
NRBC # BLD: 0 /100 WBCS — SIGNIFICANT CHANGE UP (ref 0–0)
PLATELET # BLD AUTO: 274 K/UL — SIGNIFICANT CHANGE UP (ref 150–400)
POTASSIUM SERPL-MCNC: 4.2 MMOL/L — SIGNIFICANT CHANGE UP (ref 3.5–5.3)
POTASSIUM SERPL-SCNC: 4.2 MMOL/L — SIGNIFICANT CHANGE UP (ref 3.5–5.3)
PROT SERPL-MCNC: 8.2 G/DL — SIGNIFICANT CHANGE UP (ref 6–8.3)
PROTHROM AB SERPL-ACNC: 15.5 SEC — HIGH (ref 10.5–13.4)
RBC # BLD: 3.18 M/UL — LOW (ref 4.2–5.8)
RBC # FLD: 16.1 % — HIGH (ref 10.3–14.5)
SARS-COV-2 RNA SPEC QL NAA+PROBE: NEGATIVE — SIGNIFICANT CHANGE UP
SODIUM SERPL-SCNC: 139 MMOL/L — SIGNIFICANT CHANGE UP (ref 135–145)
WBC # BLD: 8.59 K/UL — SIGNIFICANT CHANGE UP (ref 3.8–10.5)
WBC # FLD AUTO: 8.59 K/UL — SIGNIFICANT CHANGE UP (ref 3.8–10.5)

## 2022-09-29 PROCEDURE — 85025 COMPLETE CBC W/AUTO DIFF WBC: CPT

## 2022-09-29 PROCEDURE — 93005 ELECTROCARDIOGRAM TRACING: CPT

## 2022-09-29 PROCEDURE — 99284 EMERGENCY DEPT VISIT MOD MDM: CPT

## 2022-09-29 PROCEDURE — 87635 SARS-COV-2 COVID-19 AMP PRB: CPT

## 2022-09-29 PROCEDURE — 86140 C-REACTIVE PROTEIN: CPT

## 2022-09-29 PROCEDURE — 87040 BLOOD CULTURE FOR BACTERIA: CPT

## 2022-09-29 PROCEDURE — 83605 ASSAY OF LACTIC ACID: CPT

## 2022-09-29 PROCEDURE — 85610 PROTHROMBIN TIME: CPT

## 2022-09-29 PROCEDURE — 73620 X-RAY EXAM OF FOOT: CPT

## 2022-09-29 PROCEDURE — 80053 COMPREHEN METABOLIC PANEL: CPT

## 2022-09-29 PROCEDURE — 85730 THROMBOPLASTIN TIME PARTIAL: CPT

## 2022-09-29 PROCEDURE — 85652 RBC SED RATE AUTOMATED: CPT

## 2022-09-29 PROCEDURE — 73620 X-RAY EXAM OF FOOT: CPT | Mod: 26,LT

## 2022-09-29 PROCEDURE — 36415 COLL VENOUS BLD VENIPUNCTURE: CPT

## 2022-09-29 PROCEDURE — 99285 EMERGENCY DEPT VISIT HI MDM: CPT

## 2022-09-29 NOTE — ED PROVIDER NOTE - ATTENDING CONTRIBUTION TO CARE
Patient is a 76 y/o M w/ PMHx of HTN, HLD, DM, Pacemaker, PVD, and TAVR who is presenting with b/l black 1st toes with ulceration and necrosis. Patient had a LLE arterial thrombectomy of TP trunk/popliteal artery via cutdown, thrombolysis, and L SFA to TP trunk bypass w/autologous rGSV on 7/18 w/ Dr. Valerio (last office visit 9/9). He was seen at a wound care clinic today who recommended he come to the ED because they noticed exposed bone on his left great toe. Patient states that the blackness on his toes began 2 weeks ago and has gradually worsened but denies any pain. Patient denies any fever, chills, SOB, or chest pain  exam bl dp/pt pulses w dopler  scant black disoloration tip of R great toe  tiny portion of bone exposure L great toe  vasc/podiatry consults

## 2022-09-29 NOTE — ED ADULT NURSE NOTE - CHIEF COMPLAINT QUOTE
Pt sent by Dr. Evans for wound check. Pt says bilateral 1st toes are black x weeks, recently had L popliteal bypass.

## 2022-09-29 NOTE — CONSULT NOTE ADULT - ASSESSMENT
78 y/o Male w/ PMHx of HTN, HLD, DM, Pacemaker, PVD, and TAVR who is presenting with b/l distal hallux gangrene. Patient had a LLE arterial thrombectomy of TP trunk/popliteal artery via cutdown, thrombolysis, and L SFA to TP trunk bypass w/autologous rGSV on 7/18 w/ Dr. Valerio. Patients wife at bedside states that her has had wound on both of his big toes since July and they recently started to turn black. VSS, wbc 8.59, ESR 96, CRP 44.7 x rays negative for gas. No acute signs of infection at this time, dry gangrene most likely with underlying chronic osteomyelitis of left hallux.     Plan:   X rays reviewed  Gangrene is dry and stable   No acute surgical intervention at this time   Pt should follow up in the podiatry clinic this coming Tuesday to schedule outpatient amputation as gangrene is stable currently   Pt should continue Betadine DSD dressing daily at home   WBAT to b/l LEs    Follow-up at [facility] - Podiatry Outpatient Clinic within 1-2 weeks of discharge.   Address: [facility] [address] [place] [address] ([address] Floor, [place] [address].   Phone: [phone]  Please call for an appointment.    Plan D/W attending  76 y/o Male w/ PMHx of HTN, HLD, DM, Pacemaker, PVD, and TAVR who is presenting with b/l distal hallux gangrene. Patient had a LLE arterial thrombectomy of TP trunk/popliteal artery via cutdown, thrombolysis, and L SFA to TP trunk bypass w/autologous rGSV on 7/18 w/ Dr. Valerio. Patients wife at bedside states that her has had wound on both of his big toes since July and they recently started to turn black. VSS, wbc 8.59, ESR 96, CRP 44.7 x rays negative for gas. No acute signs of infection at this time, dry gangrene most likely with underlying chronic osteomyelitis of left hallux.     Plan:   X rays reviewed  Gangrene is dry and stable- no signs of acute infection at this time   No emergent surgical intervention at this time  Pt should follow up in the podiatry clinic this coming Tuesday to schedule outpatient amputation with Dr. Ruiz as gangrene is stable   Pt should continue Betadine DSD dressing daily at home   Pt should continue to f/u with vascular surgery as well  WBAT to b/l LEs    Follow-up at [facility] - Podiatry Outpatient Clinic within 1-2 weeks of discharge.   Address: [facility] [address] [place] [address] ([address] Floor, [place] [address].   Phone: [phone]  Please call for an appointment.    Plan D/W attending

## 2022-09-29 NOTE — ED PROVIDER NOTE - PATIENT PORTAL LINK FT
Brief Postoperative Note    Patient: Roseann Bolton  YOB: 1984  MRN: [id number]    Date of Procedure: 1/14/2021     Pre-Op Diagnosis: GALLSTONES    Post-Op Diagnosis: Same as preoperative diagnosis.       Procedure(s):  ROBOTIC MULTIPORT CHOLECYSTECTOMY  WITH INDOCYANINE green    Surgeon(s):  Darren Muñoz MD    Surgical Assistant: Surg Asst-1: Cuauhtemoc Harder    Anesthesia: General     Estimated Blood Loss (mL): Minimal    Complications: None    Specimens:   ID Type Source Tests Collected by Time Destination   1 : gallbladder    Darren Muñoz MD 1/14/2021 1524 Pathology        Implants: none  Drains:none    Findings: omental adhesions    Electronically Signed by Sandra Wei MD on 1/14/2021 at 4:01 PM
You can access the FollowMyHealth Patient Portal offered by [facility] by registering at the following website: [email or web address]. By joining [employer]’s FollowMyHealth portal, you will also be able to view your health information using other applications (apps) compatible with our system.

## 2022-09-29 NOTE — ED PROVIDER NOTE - OBJECTIVE STATEMENT
Patient is a 76 y/o M w/ PMHx of HTN, HLD, DM, Pacemaker, PVD, and TAVR who is presenting with b/l black 1st toes with ulceration and necrosis. Patient had a LLE arterial thrombectomy of TP trunk/popliteal artery via cutdown, thrombolysis, and L SFA to TP trunk bypass w/autologous rGSV on 7/18 w/ Dr. Valerio (last office visit 9/9). He was seen at a wound care clinic today who recommended he come to the ED because they noticed exposed bone on his left great toe. Patient states that the blackness on his toes began 2 weeks ago and has gradually worsened but denies any pain. Patient denies any fever, chills, SOB, or chest pain.

## 2022-09-29 NOTE — ED PROVIDER NOTE - NSICDXPASTMEDICALHX_GEN_ALL_CORE_FT
PAST MEDICAL HISTORY:  DM (diabetes mellitus)     High cholesterol     HTN (hypertension)     PVD (peripheral vascular disease)

## 2022-09-29 NOTE — ED PROVIDER NOTE - CLINICAL SUMMARY MEDICAL DECISION MAKING FREE TEXT BOX
Patient is a 76 y/o M w/ PMHx of HTN, HLD, DM, Pacemaker, PVD, and TAVR who is presenting with b/l black 1st toes with ulceration and necrosis. VSS. Exam w/ ischemic necrosis of b/l 1st toes and exposed bone on L great toe.     Plan:  - Labs  - ESR, CRP  - 2x BCx  - EKG  - X-ray L Foot Patient is a 78 y/o M w/ PMHx of HTN, HLD, DM, Pacemaker, PVD, and TAVR who is presenting with b/l black 1st toes with ulceration and necrosis. VSS. Exam w/ ischemic necrosis of b/l 1st toes and exposed bone on L great toe. Concerning for osteomyelitis.    Plan:  - Vascular surgery consult  - Labs  - ESR, CRP  - 2x BCx  - EKG  - X-ray L Foot Patient is a 76 y/o M w/ PMHx of HTN, HLD, DM, Pacemaker, PVD, and TAVR who is presenting with b/l black 1st toes with ulceration and necrosis. VSS. Exam w/ ischemic necrosis of b/l 1st toes and exposed bone on L great toe. Concerning for osteomyelitis  seen by vascular and podiatry- as per attg of both services- stable for dc fu on tuesday for possible further steps  si/ sx to return d/w family

## 2022-09-29 NOTE — ED PROVIDER NOTE - MUSCULOSKELETAL, MLM
+Exposed bone at tip of L great toe. Spine appears normal, range of motion is not limited, no muscle or joint tenderness

## 2022-09-29 NOTE — ED ADULT NURSE NOTE - OBJECTIVE STATEMENT
Pt sen in by  for wound eval. Had surgery on leg 4 months ago. Now having wounds to toes on bilat feet

## 2022-09-29 NOTE — CONSULT NOTE ADULT - SUBJECTIVE AND OBJECTIVE BOX
Vascular Attending:  Dr. Capone      HPI:  Patient is a 78 y/o M w/ PMHx of HTN, HLD, DM, Pacemaker, PVD, and TAVR who is presenting with b/l black 1st toes with ulceration and necrosis. Patient had a LLE arterial thrombectomy of TP trunk/popliteal artery via cutdown, thrombolysis, and L SFA to TP trunk bypass w/autologous rGSV on 7/18 w/ Dr. Valerio (last office visit 9/9). He was seen at a wound care clinic today who recommended he come to the ED because they noticed exposed bone on his left great toe. Patient states that the blackness on his toes began 2 weeks ago and has gradually worsened but denies any pain. Patient denies any fever, chills, SOB, or chest pain.    PAST MEDICAL & SURGICAL HISTORY:  HTN (hypertension)1      High cholesterol      DM (diabetes mellitus)      PVD (peripheral vascular disease)      Popliteal aneurysm      Pacemaker      S/P TAVR (transcatheter aortic valve replacement)          REVIEW OF SYSTEMS  per HPI      MEDICATIONS  (STANDING):    MEDICATIONS  (PRN):      Allergies    No Known Allergies    Intolerances        SOCIAL HISTORY:    FAMILY HISTORY:      Vital Signs Last 24 Hrs  T(C): 36.5 (29 Sep 2022 15:22), Max: 36.5 (29 Sep 2022 15:22)  T(F): 97.7 (29 Sep 2022 15:22), Max: 97.7 (29 Sep 2022 15:22)  HR: 60 (29 Sep 2022 15:22) (60 - 60)  BP: 140/60 (29 Sep 2022 15:22) (140/60 - 140/60)  BP(mean): --  RR: 18 (29 Sep 2022 15:22) (18 - 18)  SpO2: 95% (29 Sep 2022 15:22) (95% - 95%)    Parameters below as of 29 Sep 2022 15:22  Patient On (Oxygen Delivery Method): room air        PHYSICAL EXAM:  Constitutional: Pt resting comfortably in bed. NAD  Respiratory: CTA b/l  Cardiovascular: RRR  Vascular: non palpable pulses. Biphasic DP/PT b/l. Warm to Cold temp gradient from prox to distal b/l.  Neurological: Decreased sensation b/l to feet  Skin: Necrotic distal tip of hallux b/l.   Musculoskeletal: + TTP of R great toe          LABS:                        9.6    8.59  )-----------( 274      ( 29 Sep 2022 17:17 )             30.3     09-29    139  |  102  |  31<H>  ----------------------------<  200<H>  4.2   |  23  |  1.32<H>    Ca    9.9      29 Sep 2022 17:17    TPro  8.2  /  Alb  4.0  /  TBili  0.2  /  DBili  x   /  AST  11  /  ALT  8<L>  /  AlkPhos  84  09-29    PT/INR - ( 29 Sep 2022 17:17 )   PT: 15.5 sec;   INR: 1.30          PTT - ( 29 Sep 2022 17:17 )  PTT:35.9 sec      RADIOLOGY & ADDITIONAL STUDIES:    XR read pending*
Attending: Dr. Ruiz    Patient is a 77y old  Male who presents with a chief complaint of wound check     HPI: 76 y/o Male w/ PMHx of HTN, HLD, DM, Pacemaker, PVD, and TAVR who is presenting with b/l distal hallux gangrene. Patient had a LLE arterial thrombectomy of TP trunk/popliteal artery via cutdown, thrombolysis, and L SFA to TP trunk bypass w/autologous rGSV on 7/18 w/ Dr. Valerio. Patients wife at bedside states that her has had wound on both of his big toes since July and they recently started to turn black. He was told to follow up at a wound care clinic for routine wound care. He was seen at a wound care clinic today in [place], wife states the podiatrist at the wound care clinic told him to come to the emergency room as he noticed exposed bone on his left great toe. Wife states the wound have not been getting any better. Wife states they have been applying Iodosorb to both hallux wounds as well as betadine. Patient admits to pain to right hallux. Patient denies any fever, chills, SOB, or chest pain.       Review of systems negative except per HPI and as stated below  General:	 no weakness; no fevers, no chills  Skin/Breast: no rash  Respiratory and Thorax: no SOB, no cough  Cardiovascular:	No chest pain  Gastrointestinal:	 no nausea, vomiting , diarrhea  Genitourinary:	no dysuria, no difficulty urinating, no hematuria  Musculoskeletal:	no weakness, no joint swelling/pain  Neurological:	no focal weakness/numbness  Endocrine:	no polyuria, no polydipsia    PAST MEDICAL & SURGICAL HISTORY:  HTN (hypertension)      High cholesterol      DM (diabetes mellitus)      PVD (peripheral vascular disease)      Popliteal aneurysm      Pacemaker      S/P TAVR (transcatheter aortic valve replacement)        Home Medications:  allopurinol 100 mg oral tablet: 1 tab(s) orally once a day (15 Jul 2022 04:24)  ascorbic acid 500 mg oral tablet: 1 tab(s) orally once a day (25 Jul 2022 14:39)  aspirin 81 mg oral tablet, chewable: 1 tab(s) orally once a day (25 Jul 2022 14:39)  atorvastatin 40 mg oral tablet: 1 tab(s) orally once a day (15 Jul 2022 04:24)  carBAMazepine 200 mg oral tablet: 1 tab(s) orally 2 times a day (15 Jul 2022 04:24)  chlorthalidone 25 mg oral tablet: 1 tab(s) orally once a day (15 Jul 2022 04:24)  Eliquis 5 mg oral tablet: 1 tab(s) orally 2 times a day (15 Jul 2022 04:24)  fenofibrate 145 mg oral tablet: 1 tab(s) orally once a day (15 Jul 2022 04:24)  Flomax 0.4 mg oral capsule: 0.4 cap(s) orally once a day (15 Jul 2022 04:24)  Icosapent Ethyl 1 g oral capsule: 2 cap(s) orally 2 times a day (15 Jul 2022 04:24)  losartan 100 mg oral tablet: 1 tab(s) orally once a day (15 Jul 2022 04:24)  NIFEdipine 60 mg oral tablet, extended release: 1 tab(s) orally once a day (15 Jul 2022 04:24)  pantoprazole 40 mg oral delayed release tablet: 1 tab(s) orally once a day (15 Jul 2022 04:24)  propranolol 10 mg oral tablet: 1 tab(s) orally 3 times a day (15 Jul 2022 04:24)  traZODone 150 mg oral tablet: 2 tab(s) orally once (at bedtime) (15 Jul 2022 04:24)  venlafaxine 37.5 mg oral tablet: 1 tab(s) orally 2 times a day (15 Jul 2022 04:24)    Allergies    No Known Allergies    Intolerances      FAMILY HISTORY:    Social History:       LABS                        9.6    8.59  )-----------( 274      ( 29 Sep 2022 17:17 )             30.3     09-29    139  |  102  |  31<H>  ----------------------------<  200<H>  4.2   |  23  |  1.32<H>    Ca    9.9      29 Sep 2022 17:17    TPro  8.2  /  Alb  4.0  /  TBili  0.2  /  DBili  x   /  AST  11  /  ALT  8<L>  /  AlkPhos  84  09-29    PT/INR - ( 29 Sep 2022 17:17 )   PT: 15.5 sec;   INR: 1.30          PTT - ( 29 Sep 2022 17:17 )  PTT:35.9 sec  ESR: 96  CRP: --  09-29 @ 17:17    Vital Signs Last 24 Hrs  T(C): 36.5 (29 Sep 2022 15:22), Max: 36.5 (29 Sep 2022 15:22)  T(F): 97.7 (29 Sep 2022 15:22), Max: 97.7 (29 Sep 2022 15:22)  HR: 60 (29 Sep 2022 15:22) (60 - 60)  BP: 140/60 (29 Sep 2022 15:22) (140/60 - 140/60)  BP(mean): --  RR: 18 (29 Sep 2022 15:22) (18 - 18)  SpO2: 95% (29 Sep 2022 15:22) (95% - 95%)    Parameters below as of 29 Sep 2022 15:22  Patient On (Oxygen Delivery Method): room air      PHYSICAL EXAM  General: NAD, AA0x3  Lower Extremity Focused:  Vasc: Non-palpable pulses B/L, B/L DP/PT biphasic on doppler  Derm: Left hallux: 0.5x0.5cm wound to distal hallux, fibrotic base, probes to bone, surrounding tissue of distal hallux dry and gangrenous, no drainage noted  Right hallux: dry gangrene to distal hallux, 1x2cm open wound to dorsum of hallux which missing nail plate, fibrotic and granular wound bed, slight wali-wound erythema, no drainage negative PTB.   Neuro: Decreased sensation b/l   MSK: TTP to right great toe     RADIOLOGY:   X ray left foot: WET read: soft tissue defect noted over dorsum of hallux, no soft tissue gas, small chip fracture of proximal phalanx base.

## 2022-09-29 NOTE — CONSULT NOTE ADULT - ASSESSMENT
Patient is a 78 y/o M w/ PMHx of HTN, HLD, DM, Pacemaker, PVD, and TAVR who is presenting with b/l black 1st toes with ulceration and necrosis. Patient had a LLE arterial thrombectomy of TP trunk/popliteal artery via cutdown, thrombolysis, and L SFA to TP trunk bypass w/autologous rGSV on 7/18 w/ Dr. Valerio (last office visit 9/9). He was seen at a wound care clinic today who recommended he come to the ED because they noticed exposed bone on his left great toe. Vascular consulted for evaluation of wounds/ need for ?revasc. Of note, pt is afebrile, VSS, and WBC wnl.     Plan:  - pt evaluated at bedside and chart reviewed  - reviewed XRs  - [facility] Podiatry consult  - no intervention at this time    Vascular will follow. Patient is a 78 y/o M w/ PMHx of HTN, HLD, DM, Pacemaker, PVD, and TAVR who is presenting with b/l black 1st toes with ulceration and necrosis. Patient had a LLE arterial thrombectomy of TP trunk/popliteal artery via cutdown, thrombolysis, and L SFA to TP trunk bypass w/autologous rGSV on 7/18 w/ Dr. Valerio (last office visit 9/9). He was seen at a wound care clinic today who recommended he come to the ED because they noticed exposed bone on his left great toe. Vascular consulted for evaluation of wounds/ need for ?revasc. Of note, pt is afebrile, VSS, and WBC wnl.     Plan:  - pt evaluated at bedside and chart reviewed  - reviewed XRs  - [facility] Podiatry consult  - no intervention at this time    Vascular will follow.    Addendum: Pt cleared for discharge, bypass patent with chronic osteomyelitis.  Pt to follow up with Podiatry and Vascular on Tues 10/4.  [phone]

## 2022-09-29 NOTE — ED PROVIDER NOTE - NS ED ATTENDING STATEMENT MOD
I have seen and examined this patient and fully participated in the care of this patient as the teaching attending.  The service was shared with the JAYLEN.  I reviewed and verified the documentation and independently performed the documented:

## 2022-10-03 DIAGNOSIS — E11.69 TYPE 2 DIABETES MELLITUS WITH OTHER SPECIFIED COMPLICATION: ICD-10-CM

## 2022-10-03 DIAGNOSIS — Z20.822 CONTACT WITH AND (SUSPECTED) EXPOSURE TO COVID-19: ICD-10-CM

## 2022-10-03 DIAGNOSIS — I10 ESSENTIAL (PRIMARY) HYPERTENSION: ICD-10-CM

## 2022-10-03 DIAGNOSIS — E11.51 TYPE 2 DIABETES MELLITUS WITH DIABETIC PERIPHERAL ANGIOPATHY WITHOUT GANGRENE: ICD-10-CM

## 2022-10-03 DIAGNOSIS — L97.514 NON-PRESSURE CHRONIC ULCER OF OTHER PART OF RIGHT FOOT WITH NECROSIS OF BONE: ICD-10-CM

## 2022-10-03 DIAGNOSIS — Z95.0 PRESENCE OF CARDIAC PACEMAKER: ICD-10-CM

## 2022-10-03 DIAGNOSIS — Z87.891 PERSONAL HISTORY OF NICOTINE DEPENDENCE: ICD-10-CM

## 2022-10-03 DIAGNOSIS — Z95.2 PRESENCE OF PROSTHETIC HEART VALVE: ICD-10-CM

## 2022-10-03 DIAGNOSIS — Z79.01 LONG TERM (CURRENT) USE OF ANTICOAGULANTS: ICD-10-CM

## 2022-10-03 DIAGNOSIS — M86.9 OSTEOMYELITIS, UNSPECIFIED: ICD-10-CM

## 2022-10-03 DIAGNOSIS — E78.00 PURE HYPERCHOLESTEROLEMIA, UNSPECIFIED: ICD-10-CM

## 2022-10-03 DIAGNOSIS — Z79.82 LONG TERM (CURRENT) USE OF ASPIRIN: ICD-10-CM

## 2022-10-04 LAB
CULTURE RESULTS: SIGNIFICANT CHANGE UP
CULTURE RESULTS: SIGNIFICANT CHANGE UP
SPECIMEN SOURCE: SIGNIFICANT CHANGE UP
SPECIMEN SOURCE: SIGNIFICANT CHANGE UP

## 2022-10-25 ENCOUNTER — APPOINTMENT (OUTPATIENT)
Dept: VASCULAR SURGERY | Facility: CLINIC | Age: 77
End: 2022-10-25

## 2022-11-08 ENCOUNTER — APPOINTMENT (OUTPATIENT)
Dept: VASCULAR SURGERY | Facility: CLINIC | Age: 77
End: 2022-11-08

## 2022-11-08 VITALS
HEIGHT: 70 IN | DIASTOLIC BLOOD PRESSURE: 100 MMHG | SYSTOLIC BLOOD PRESSURE: 164 MMHG | HEART RATE: 68 BPM | BODY MASS INDEX: 25.62 KG/M2 | WEIGHT: 179 LBS

## 2022-11-08 DIAGNOSIS — T81.31XA DISRUPTION OF EXTERNAL OPERATION (SURGICAL) WOUND, NOT ELSEWHERE CLASSIFIED, INITIAL ENCOUNTER: ICD-10-CM

## 2022-11-08 PROCEDURE — 99024 POSTOP FOLLOW-UP VISIT: CPT

## 2022-11-09 PROBLEM — T81.31XA POSTOPERATIVE WOUND DEHISCENCE, INITIAL ENCOUNTER: Status: ACTIVE | Noted: 2022-09-06

## 2022-11-09 RX ORDER — SILVER SULFADIAZINE 10 MG/G
1 CREAM TOPICAL
Qty: 400 | Refills: 0 | Status: ACTIVE | COMMUNITY
Start: 2022-08-17

## 2022-11-09 RX ORDER — OLANZAPINE 2.5 MG/1
2.5 TABLET, FILM COATED ORAL
Qty: 30 | Refills: 0 | Status: ACTIVE | COMMUNITY
Start: 2022-08-07

## 2022-11-09 RX ORDER — OXYCODONE 5 MG/1
5 TABLET ORAL
Qty: 20 | Refills: 0 | Status: ACTIVE | COMMUNITY
Start: 2022-08-15

## 2022-11-09 RX ORDER — MIRTAZAPINE 15 MG/1
15 TABLET, FILM COATED ORAL
Qty: 30 | Refills: 0 | Status: ACTIVE | COMMUNITY
Start: 2022-10-19

## 2022-11-09 RX ORDER — ALBUTEROL SULFATE 2.5 MG/3ML
(2.5 MG/3ML) SOLUTION RESPIRATORY (INHALATION)
Qty: 375 | Refills: 0 | Status: ACTIVE | COMMUNITY
Start: 2022-09-28

## 2022-11-09 RX ORDER — SILVER 2" X 2"
0.9 BANDAGE TOPICAL
Qty: 40 | Refills: 0 | Status: ACTIVE | COMMUNITY
Start: 2022-08-17

## 2022-11-09 RX ORDER — AMOXICILLIN AND CLAVULANATE POTASSIUM 875; 125 MG/1; MG/1
875-125 TABLET, COATED ORAL
Qty: 20 | Refills: 0 | Status: ACTIVE | COMMUNITY
Start: 2022-10-28

## 2022-11-09 RX ORDER — VENLAFAXINE HYDROCHLORIDE 75 MG/1
75 CAPSULE, EXTENDED RELEASE ORAL
Qty: 60 | Refills: 0 | Status: ACTIVE | COMMUNITY
Start: 2022-09-19

## 2022-11-09 RX ORDER — ATORVASTATIN CALCIUM 80 MG/1
80 TABLET, FILM COATED ORAL
Qty: 30 | Refills: 0 | Status: ACTIVE | COMMUNITY
Start: 2022-09-12

## 2022-11-09 RX ORDER — OLANZAPINE 5 MG/1
5 TABLET, FILM COATED ORAL
Qty: 15 | Refills: 0 | Status: ACTIVE | COMMUNITY
Start: 2022-08-19

## 2022-11-10 NOTE — PHYSICAL EXAM
[Normal Thyroid] : the thyroid was normal [Normal Breath Sounds] : Normal breath sounds [Respiratory Effort] : normal respiratory effort [Normal Heart Sounds] : normal heart sounds [Normal Rate and Rhythm] : normal rate and rhythm [2+] : right 2+ [0] : right 0 [1+] : left 1+ [Ankle Swelling (On Exam)] : present [Ankle Swelling On The Left] : moderate [Skin Ulcer] : ulcer [Alert] : alert [Calm] : calm [JVD] : no jugular venous distention  [Carotid Bruits] : no carotid bruits [Left Carotid Bruit] : no bruit heard over the left carotid [Right Carotid Bruit] : no bruit heard over the right carotid [Varicose Veins Of Lower Extremities] : not present [] : not present [Abdomen Masses] : No abdominal masses [Abdomen Tenderness] : ~T ~M No abdominal tenderness [Purpura] : no purpura  [Petechiae] : no petechiae [Skin Induration] : no induration [de-identified] : Well-nourished, NAD [de-identified] : NC/AT, anicteric [FreeTextEntry1] : Feet warm to touch b/l [de-identified] : FROM throughout, strength 5/5, no palpable cords in the LEs [de-identified] :  L lower leg 1+ edema, all wounds healed

## 2022-11-10 NOTE — DISCUSSION/SUMMARY
[FreeTextEntry1] : 77yoM who presented to [facility] 2d after being seen in an outside ED for paresthesias/anesthesia in the L foot due to arterial occlusion requiring BKA.  Pt admitted, then underwent LLE arterial thrombectomy of TP trunk/pop a via cutdown, thrombolysis, L SFA to TP trunk bypass w/autologous rGSV, returning for POC.  Currently, pt reports no pain/discomfort in the leg and states that his dehiscence is healed.\par \par Still slight edema in the LLE, all wounds closed at this time.  Would recommend pt continue to moisturize the skin BID and ambulate regularly, elevating the LLE after ambulation.  He may start using compression diabetic socks to improve edema but not compromise the bypass.  He will RTO in 6mos for surveillance or sooner for any new issues.

## 2022-11-10 NOTE — REASON FOR VISIT
[de-identified] : LLE arterial thrombectomy of TP trunk/pop a via cutdown, thrombolysis, L SFA to TP trunk bypass w/autologous rGSV [de-identified] : 07/18/2022 [de-identified] : 15 [de-identified] : 77yoM who presented to [facility] 2d after being seen in an outside ED for paresthesias/anesthesia in the L foot due to arterial occlusion requiring BKA.  Pt admitted, then underwent LLE arterial thrombectomy of TP trunk/pop a via cutdown, thrombolysis, L SFA to TP trunk bypass w/autologous rGSV, returning for POC.  Currently, pt reports no pain/discomfort in the leg and states that his dehiscence is healed.

## 2023-03-21 ENCOUNTER — APPOINTMENT (OUTPATIENT)
Dept: VASCULAR SURGERY | Facility: CLINIC | Age: 78
End: 2023-03-21
Payer: MEDICARE

## 2023-03-21 VITALS
WEIGHT: 179 LBS | DIASTOLIC BLOOD PRESSURE: 68 MMHG | HEIGHT: 70 IN | HEART RATE: 74 BPM | BODY MASS INDEX: 25.62 KG/M2 | SYSTOLIC BLOOD PRESSURE: 168 MMHG

## 2023-03-21 DIAGNOSIS — I74.9 EMBOLISM AND THROMBOSIS OF UNSPECIFIED ARTERY: ICD-10-CM

## 2023-03-21 PROCEDURE — 93926 LOWER EXTREMITY STUDY: CPT

## 2023-03-21 PROCEDURE — 99213 OFFICE O/P EST LOW 20 MIN: CPT

## 2023-03-22 PROBLEM — I74.9: Status: ACTIVE | Noted: 2022-08-05

## 2023-03-22 NOTE — DATA REVIEWED
----- Message from Toby Hernandez MD sent at 11/9/2020 10:07 AM CST -----  Please schedule C-scope (hx polyps) for Dr Song.    
Noted - pt advised  
Per current guideline recommendations, patient can be cleared to hold Eliquis for 2 days prior to procedure and resume at MD discretion.   thanks  
Spoke with pt. Scheduled c-scope & covid test. Prep instructions sent to pt's mychart. Pt verbalized understanding to all.       Pt scheduled for c-scope this Friday.   Can he hold eliquis for 2 days prior?  
[No studies available for review at this time.] : No studies available for review at this time.

## 2023-03-27 NOTE — PHYSICAL EXAM
[Normal Thyroid] : the thyroid was normal [Normal Breath Sounds] : Normal breath sounds [Respiratory Effort] : normal respiratory effort [Normal Heart Sounds] : normal heart sounds [Normal Rate and Rhythm] : normal rate and rhythm [2+] : right 2+ [0] : right 0 [1+] : left 1+ [Ankle Swelling (On Exam)] : present [Ankle Swelling On The Left] : moderate [Skin Ulcer] : ulcer [Alert] : alert [Calm] : calm [JVD] : no jugular venous distention  [Carotid Bruits] : no carotid bruits [Right Carotid Bruit] : no bruit heard over the right carotid [Left Carotid Bruit] : no bruit heard over the left carotid [Varicose Veins Of Lower Extremities] : not present [] : not present [Abdomen Masses] : No abdominal masses [Abdomen Tenderness] : ~T ~M No abdominal tenderness [Purpura] : no purpura  [Petechiae] : no petechiae [Skin Induration] : no induration [de-identified] : Well-nourished, NAD [de-identified] : NC/AT, anicteric [FreeTextEntry1] : Feet warm to touch b/l [de-identified] : FROM throughout, strength 5/5, no palpable cords in the LEs [de-identified] : Previous surgical wounds healed, but 2.5x2.5cm L plantar great toe ulcer healing w/100% granulation, no callus, evidence of infection or lymphatic changes.

## 2023-03-27 NOTE — HISTORY OF PRESENT ILLNESS
[FreeTextEntry1] : 78yoM who presented to [facility] 2d after being seen in an outside ED for paresthesias/anesthesia in the L foot due to arterial occlusion requiring BKA, was admitted, and underwent LLE arterial thrombectomy of TP trunk/pop a via cutdown, thrombolysis, L SFA to TP trunk bypass w/autologous rGSV.  Pt returning today for surveillance, reports that all previous surgical incisions have healed, but he still has a persistent L great toe ulcer that is being followed by an outside wound care center, and has been healing steadily.  He reports no new issues at this time.\par

## 2023-03-27 NOTE — ASSESSMENT
[FreeTextEntry1] : 78yoM who presented to [facility] 2d after being seen in an outside ED for paresthesias/anesthesia in the L foot due to arterial occlusion requiring BKA, was admitted, and underwent LLE arterial thrombectomy of TP trunk/pop a via cutdown, thrombolysis, L SFA to TP trunk bypass w/autologous rGSV.  Pt returning today for surveillance, reports that all previous surgical incisions have healed, but he still has a persistent L great toe ulcer that is being followed by an outside wound care center, and has been healing steadily.  He reports no new issues at this time.\par \par L leg wounds healed at this time, but L great toe plantar ulcer still healing.  Arterial duplex performed today to evaluate bypass for patecy reveals a widely patent L fem-BK pop bypass w/good velocities throughout.  Pt will continue regular wound care w/his wound care center, and will RTO in 6mos for surveillance or sooner for any new issues.

## 2023-03-27 NOTE — PROCEDURE
[FreeTextEntry1] : Arterial duplex performed today to evaluate bypass for patecy reveals a widely patent L fem-BK pop bypass w/good velocities throughout

## 2023-09-12 ENCOUNTER — APPOINTMENT (OUTPATIENT)
Dept: VASCULAR SURGERY | Facility: CLINIC | Age: 78
End: 2023-09-12

## 2024-02-25 RX ORDER — PROPRANOLOL HCL 160 MG
1 CAPSULE, EXTENDED RELEASE 24HR ORAL
Qty: 0 | Refills: 0 | DISCHARGE

## 2024-02-25 RX ORDER — ICOSAPENT ETHYL 500 MG/1
2 CAPSULE, LIQUID FILLED ORAL
Qty: 0 | Refills: 0 | DISCHARGE

## 2024-02-25 RX ORDER — APIXABAN 2.5 MG/1
1 TABLET, FILM COATED ORAL
Qty: 0 | Refills: 0 | DISCHARGE

## 2024-02-25 RX ORDER — CARBAMAZEPINE 200 MG
1 TABLET ORAL
Qty: 0 | Refills: 0 | DISCHARGE

## 2024-02-25 RX ORDER — CHLORTHALIDONE 50 MG
1 TABLET ORAL
Qty: 0 | Refills: 0 | DISCHARGE

## 2024-02-25 RX ORDER — TAMSULOSIN HYDROCHLORIDE 0.4 MG/1
0.4 CAPSULE ORAL
Qty: 0 | Refills: 0 | DISCHARGE

## 2024-02-25 RX ORDER — TRAZODONE HCL 50 MG
2 TABLET ORAL
Qty: 0 | Refills: 0 | DISCHARGE

## 2024-02-25 RX ORDER — PANTOPRAZOLE SODIUM 20 MG/1
1 TABLET, DELAYED RELEASE ORAL
Qty: 0 | Refills: 0 | DISCHARGE

## 2024-02-25 RX ORDER — ATORVASTATIN CALCIUM 80 MG/1
1 TABLET, FILM COATED ORAL
Qty: 0 | Refills: 0 | DISCHARGE

## 2024-02-25 RX ORDER — ALLOPURINOL 300 MG
1 TABLET ORAL
Qty: 0 | Refills: 0 | DISCHARGE

## 2024-02-25 RX ORDER — LOSARTAN POTASSIUM 100 MG/1
1 TABLET, FILM COATED ORAL
Qty: 0 | Refills: 0 | DISCHARGE

## 2024-02-25 RX ORDER — FENOFIBRATE,MICRONIZED 130 MG
1 CAPSULE ORAL
Qty: 0 | Refills: 0 | DISCHARGE

## 2024-02-25 RX ORDER — NIFEDIPINE 30 MG
1 TABLET, EXTENDED RELEASE 24 HR ORAL
Qty: 0 | Refills: 0 | DISCHARGE

## 2024-02-25 RX ORDER — VENLAFAXINE HCL 75 MG
1 CAPSULE, EXT RELEASE 24 HR ORAL
Qty: 0 | Refills: 0 | DISCHARGE

## 2024-04-15 NOTE — ED ADULT NURSE NOTE - NS_NURSE_DISC_TEACHING_YN_ED_ALL_ED
This writer attempted to contact patient on 04/15/24.    Reason for call provider's message and left message to call clinic back at [phone].    If patient calls back:   Please inform refill request below approved and sent to pharmacy.       AMRITA Patel  [facility]   Yes

## 2024-06-27 NOTE — ED ADULT TRIAGE NOTE - AS HEIGHT TYPE
06/27/24 12:33 PM     Chart reviewed for Diabetic Eye Exam was/were not submitted to the patient's insurance.     Ania Lorenzo MA   [facility]   stated

## (undated) DEVICE — SUT SILK 2-0 12-18"

## (undated) DEVICE — GEL ULTRASOUND 0.25L

## (undated) DEVICE — PACK VASCULAR MINOR

## (undated) DEVICE — SUT PROLENE 6-0 30" RB-2

## (undated) DEVICE — SUT VICRYL 2-0 27" CT-1 UNDYED

## (undated) DEVICE — SUT PROLENE 7-0 18" BV

## (undated) DEVICE — GLV 7.5 PROTEXIS (WHITE)

## (undated) DEVICE — FOLEY TRAY 16FR LF URINE METER SURESTEP

## (undated) DEVICE — DRAPE TOWEL WHITE 18" X 26"

## (undated) DEVICE — PREP BETADINE KIT

## (undated) DEVICE — SUT SILK 2-0 18" SH (POP-OFF)

## (undated) DEVICE — CANISTER SPECIMEN CONVERTOR PLASTIC

## (undated) DEVICE — WARMING BLANKET UPPER ADULT

## (undated) DEVICE — VESSEL LOOP MAXI-BLUE 0.120" X 16"

## (undated) DEVICE — DRAPE IOBAN 13" X 13"

## (undated) DEVICE — DRAPE IOBAN 23" X 23"

## (undated) DEVICE — SUT SILK 4-0 18" TIES

## (undated) DEVICE — MARKING PEN W RULER

## (undated) DEVICE — SUT MONOCRYL 4-0 18" PS-2

## (undated) DEVICE — ELCTR GROUNDING PAD ADULT COVIDIEN

## (undated) DEVICE — SUT PROLENE 5-0 36" RB-1